# Patient Record
Sex: MALE | Race: WHITE | NOT HISPANIC OR LATINO | URBAN - METROPOLITAN AREA
[De-identification: names, ages, dates, MRNs, and addresses within clinical notes are randomized per-mention and may not be internally consistent; named-entity substitution may affect disease eponyms.]

---

## 2017-03-31 ENCOUNTER — EMERGENCY (EMERGENCY)
Facility: HOSPITAL | Age: 77
LOS: 1 days | Discharge: HOME | End: 2017-03-31
Attending: EMERGENCY MEDICINE | Admitting: EMERGENCY MEDICINE
Payer: MEDICARE

## 2017-03-31 VITALS
RESPIRATION RATE: 14 BRPM | SYSTOLIC BLOOD PRESSURE: 162 MMHG | DIASTOLIC BLOOD PRESSURE: 82 MMHG | OXYGEN SATURATION: 98 % | HEART RATE: 74 BPM | TEMPERATURE: 97 F

## 2017-03-31 PROCEDURE — 99284 EMERGENCY DEPT VISIT MOD MDM: CPT | Mod: GC

## 2017-03-31 PROCEDURE — 70486 CT MAXILLOFACIAL W/O DYE: CPT | Mod: 26

## 2017-03-31 RX ORDER — TETANUS TOXOID, REDUCED DIPHTHERIA TOXOID AND ACELLULAR PERTUSSIS VACCINE, ADSORBED 5; 2.5; 8; 8; 2.5 [IU]/.5ML; [IU]/.5ML; UG/.5ML; UG/.5ML; UG/.5ML
0.5 SUSPENSION INTRAMUSCULAR ONCE
Qty: 0 | Refills: 0 | Status: COMPLETED | OUTPATIENT
Start: 2017-03-31 | End: 2017-03-31

## 2017-03-31 RX ADMIN — TETANUS TOXOID, REDUCED DIPHTHERIA TOXOID AND ACELLULAR PERTUSSIS VACCINE, ADSORBED 0.5 MILLILITER(S): 5; 2.5; 8; 8; 2.5 SUSPENSION INTRAMUSCULAR at 22:48

## 2017-03-31 NOTE — ED PROVIDER NOTE - EYES, MLM
Clear bilaterally, pupils equal, round and reactive to light. Below the left eye hematoma and a superficial linear subcm lac no active bleeding, mild ttp at the left infraorbital area

## 2017-03-31 NOTE — ED PROVIDER NOTE - OBJECTIVE STATEMENT
75yo male with pmh of HTN, asthma, depression presents with left below the eye hematoma. Pt states that about 2 hours he was opening his car door quickly and hit his face. Pt denies loc or any other trauma. Pt states he had some bleeding came in because wife insisted. Pt has been acting normally denies all other symptoms.     Meds as per pharmacy - proair, tamsulosin, metformin, amlodipine, k, atrovastatin, citalopram, vesicare

## 2017-03-31 NOTE — ED ADULT TRIAGE NOTE - CHIEF COMPLAINT QUOTE
states was getting into his car and accidentally hit his face with the car door. small lac to left side of face, under eye with 1 inch hematoma. no trauma to eye. no headache. no other injuries. denies any pain, states it stings.

## 2017-03-31 NOTE — ED ADULT NURSE NOTE - OBJECTIVE STATEMENT
Patient a&ox4, ambulatory, arrived to intake room 6. Patient states opened car door and hit left cheek. Hematoma noted. No change in vision, No LOC, no LOC, no pain at this time. Patient seen by ed provider, medicated as documented, NAD, resting comfortably in stretcher. Awaiting Ct scan.

## 2017-03-31 NOTE — ED PROVIDER NOTE - ATTENDING CONTRIBUTION TO CARE
77 y/o M with h/o HTN, asthma, depression here with left facial swelling, bruising, and laceration.  Pt states that he was opening his car door, when the wind caught it and the door flew open and the upper corner of the door struck his left cheek.  Pt reports swelling, bruising, and laceration to the area.  No LOC or other injuries.  Unknown last tetanus.  Well appearing, lying comfortably in stretcher, awake and alert, nontoxic.  VSS.  NCAT, EOMI, PERRL with no proptosis or pain with EOM.  (+)contusion and ecchymosis to left maxilla, tenderness to palptation along left maxilla with no midface instability.  TMJ intact.  No malocclusion.  Lungs cta bl.  Cards nl S1/S2, RRR, no MRG.  Abd soft ntnd.  Plan to update tetanus, ct maxface to r/o facial fx.

## 2018-04-10 NOTE — ED ADULT NURSE NOTE - CADM POA CENTRAL LINE
Date Scheduled For Excision (Optional): may 1, 2019 X Size Of Lesion In Cm (Optional): 0 Detail Level: Detailed No

## 2019-10-04 NOTE — ED ADULT TRIAGE NOTE - PAIN RATING/NUMBER SCALE (0-10): ACTIVITY
Vascular Surgery  --comfortable; no changes from my end  --right foot is viable but cool/non-functional; ischemic muscle  --agree with SNF; if worsens or develops RLE pain/necrosis, next step is AKA (I am hoping we can avoid this) 0

## 2020-11-05 ENCOUNTER — INPATIENT (INPATIENT)
Facility: HOSPITAL | Age: 80
LOS: 1 days | Discharge: HOME | End: 2020-11-07
Attending: INTERNAL MEDICINE | Admitting: INTERNAL MEDICINE
Payer: MEDICARE

## 2020-11-05 VITALS
SYSTOLIC BLOOD PRESSURE: 173 MMHG | DIASTOLIC BLOOD PRESSURE: 109 MMHG | RESPIRATION RATE: 16 BRPM | TEMPERATURE: 98 F | OXYGEN SATURATION: 100 % | HEART RATE: 82 BPM

## 2020-11-05 DIAGNOSIS — R94.31 ABNORMAL ELECTROCARDIOGRAM [ECG] [EKG]: ICD-10-CM

## 2020-11-05 DIAGNOSIS — I20.0 UNSTABLE ANGINA: ICD-10-CM

## 2020-11-05 DIAGNOSIS — E11.9 TYPE 2 DIABETES MELLITUS WITHOUT COMPLICATIONS: ICD-10-CM

## 2020-11-05 DIAGNOSIS — I10 ESSENTIAL (PRIMARY) HYPERTENSION: ICD-10-CM

## 2020-11-05 LAB
ALBUMIN SERPL ELPH-MCNC: 4.4 G/DL — SIGNIFICANT CHANGE UP (ref 3.3–5)
ALP SERPL-CCNC: 115 U/L — SIGNIFICANT CHANGE UP (ref 40–120)
ALT FLD-CCNC: 11 U/L — SIGNIFICANT CHANGE UP (ref 4–41)
AMPHET UR-MCNC: NEGATIVE — SIGNIFICANT CHANGE UP
ANION GAP SERPL CALC-SCNC: 12 MMO/L — SIGNIFICANT CHANGE UP (ref 7–14)
APAP SERPL-MCNC: < 15 UG/ML — LOW (ref 15–25)
APTT BLD: 37.6 SEC — HIGH (ref 27–36.3)
AST SERPL-CCNC: 15 U/L — SIGNIFICANT CHANGE UP (ref 4–40)
BARBITURATES UR SCN-MCNC: NEGATIVE — SIGNIFICANT CHANGE UP
BASOPHILS # BLD AUTO: 0.05 K/UL — SIGNIFICANT CHANGE UP (ref 0–0.2)
BASOPHILS NFR BLD AUTO: 0.6 % — SIGNIFICANT CHANGE UP (ref 0–2)
BENZODIAZ UR-MCNC: NEGATIVE — SIGNIFICANT CHANGE UP
BILIRUB SERPL-MCNC: 0.9 MG/DL — SIGNIFICANT CHANGE UP (ref 0.2–1.2)
BUN SERPL-MCNC: 15 MG/DL — SIGNIFICANT CHANGE UP (ref 7–23)
CALCIUM SERPL-MCNC: 9.4 MG/DL — SIGNIFICANT CHANGE UP (ref 8.4–10.5)
CANNABINOIDS UR-MCNC: NEGATIVE — SIGNIFICANT CHANGE UP
CHLORIDE SERPL-SCNC: 103 MMOL/L — SIGNIFICANT CHANGE UP (ref 98–107)
CO2 SERPL-SCNC: 27 MMOL/L — SIGNIFICANT CHANGE UP (ref 22–31)
COCAINE METAB.OTHER UR-MCNC: NEGATIVE — SIGNIFICANT CHANGE UP
CREAT SERPL-MCNC: 1.2 MG/DL — SIGNIFICANT CHANGE UP (ref 0.5–1.3)
EOSINOPHIL # BLD AUTO: 0.08 K/UL — SIGNIFICANT CHANGE UP (ref 0–0.5)
EOSINOPHIL NFR BLD AUTO: 1 % — SIGNIFICANT CHANGE UP (ref 0–6)
ETHANOL BLD-MCNC: < 10 MG/DL — SIGNIFICANT CHANGE UP
GLUCOSE BLDC GLUCOMTR-MCNC: 149 MG/DL — HIGH (ref 70–99)
GLUCOSE BLDC GLUCOMTR-MCNC: 161 MG/DL — HIGH (ref 70–99)
GLUCOSE BLDC GLUCOMTR-MCNC: 215 MG/DL — HIGH (ref 70–99)
GLUCOSE SERPL-MCNC: 200 MG/DL — HIGH (ref 70–99)
HBA1C BLD-MCNC: 7.7 % — HIGH (ref 4–5.6)
HCT VFR BLD CALC: 50.3 % — HIGH (ref 39–50)
HGB BLD-MCNC: 17.5 G/DL — HIGH (ref 13–17)
IMM GRANULOCYTES NFR BLD AUTO: 0.1 % — SIGNIFICANT CHANGE UP (ref 0–1.5)
INR BLD: 1.04 — SIGNIFICANT CHANGE UP (ref 0.88–1.16)
LYMPHOCYTES # BLD AUTO: 2.34 K/UL — SIGNIFICANT CHANGE UP (ref 1–3.3)
LYMPHOCYTES # BLD AUTO: 30 % — SIGNIFICANT CHANGE UP (ref 13–44)
MCHC RBC-ENTMCNC: 30.6 PG — SIGNIFICANT CHANGE UP (ref 27–34)
MCHC RBC-ENTMCNC: 34.8 % — SIGNIFICANT CHANGE UP (ref 32–36)
MCV RBC AUTO: 87.9 FL — SIGNIFICANT CHANGE UP (ref 80–100)
METHADONE UR-MCNC: NEGATIVE — SIGNIFICANT CHANGE UP
MONOCYTES # BLD AUTO: 0.55 K/UL — SIGNIFICANT CHANGE UP (ref 0–0.9)
MONOCYTES NFR BLD AUTO: 7.1 % — SIGNIFICANT CHANGE UP (ref 2–14)
NEUTROPHILS # BLD AUTO: 4.76 K/UL — SIGNIFICANT CHANGE UP (ref 1.8–7.4)
NEUTROPHILS NFR BLD AUTO: 61.2 % — SIGNIFICANT CHANGE UP (ref 43–77)
NRBC # FLD: 0 K/UL — SIGNIFICANT CHANGE UP (ref 0–0)
OPIATES UR-MCNC: NEGATIVE — SIGNIFICANT CHANGE UP
OXYCODONE UR-MCNC: NEGATIVE — SIGNIFICANT CHANGE UP
PCP UR-MCNC: NEGATIVE — SIGNIFICANT CHANGE UP
PLATELET # BLD AUTO: 249 K/UL — SIGNIFICANT CHANGE UP (ref 150–400)
PMV BLD: 9.8 FL — SIGNIFICANT CHANGE UP (ref 7–13)
POTASSIUM SERPL-MCNC: 3.5 MMOL/L — SIGNIFICANT CHANGE UP (ref 3.5–5.3)
POTASSIUM SERPL-SCNC: 3.5 MMOL/L — SIGNIFICANT CHANGE UP (ref 3.5–5.3)
PROT SERPL-MCNC: 6.8 G/DL — SIGNIFICANT CHANGE UP (ref 6–8.3)
PROTHROM AB SERPL-ACNC: 11.9 SEC — SIGNIFICANT CHANGE UP (ref 10.6–13.6)
RBC # BLD: 5.72 M/UL — SIGNIFICANT CHANGE UP (ref 4.2–5.8)
RBC # FLD: 12.4 % — SIGNIFICANT CHANGE UP (ref 10.3–14.5)
SALICYLATES SERPL-MCNC: < 5 MG/DL — LOW (ref 15–30)
SARS-COV-2 RNA SPEC QL NAA+PROBE: SIGNIFICANT CHANGE UP
SODIUM SERPL-SCNC: 142 MMOL/L — SIGNIFICANT CHANGE UP (ref 135–145)
TROPONIN T, HIGH SENSITIVITY: 26 NG/L — SIGNIFICANT CHANGE UP (ref ?–14)
TROPONIN T, HIGH SENSITIVITY: 27 NG/L — SIGNIFICANT CHANGE UP (ref ?–14)
WBC # BLD: 7.79 K/UL — SIGNIFICANT CHANGE UP (ref 3.8–10.5)
WBC # FLD AUTO: 7.79 K/UL — SIGNIFICANT CHANGE UP (ref 3.8–10.5)

## 2020-11-05 PROCEDURE — 99285 EMERGENCY DEPT VISIT HI MDM: CPT

## 2020-11-05 PROCEDURE — 93010 ELECTROCARDIOGRAM REPORT: CPT

## 2020-11-05 PROCEDURE — 71046 X-RAY EXAM CHEST 2 VIEWS: CPT | Mod: 26

## 2020-11-05 PROCEDURE — 93306 TTE W/DOPPLER COMPLETE: CPT | Mod: 26

## 2020-11-05 RX ORDER — ASPIRIN/CALCIUM CARB/MAGNESIUM 324 MG
324 TABLET ORAL ONCE
Refills: 0 | Status: COMPLETED | OUTPATIENT
Start: 2020-11-05 | End: 2020-11-05

## 2020-11-05 RX ORDER — INSULIN LISPRO 100/ML
VIAL (ML) SUBCUTANEOUS AT BEDTIME
Refills: 0 | Status: DISCONTINUED | OUTPATIENT
Start: 2020-11-05 | End: 2020-11-07

## 2020-11-05 RX ORDER — AMLODIPINE BESYLATE 2.5 MG/1
10 TABLET ORAL DAILY
Refills: 0 | Status: DISCONTINUED | OUTPATIENT
Start: 2020-11-05 | End: 2020-11-07

## 2020-11-05 RX ORDER — GLUCAGON INJECTION, SOLUTION 0.5 MG/.1ML
1 INJECTION, SOLUTION SUBCUTANEOUS ONCE
Refills: 0 | Status: DISCONTINUED | OUTPATIENT
Start: 2020-11-05 | End: 2020-11-07

## 2020-11-05 RX ORDER — DEXTROSE 50 % IN WATER 50 %
25 SYRINGE (ML) INTRAVENOUS ONCE
Refills: 0 | Status: DISCONTINUED | OUTPATIENT
Start: 2020-11-05 | End: 2020-11-07

## 2020-11-05 RX ORDER — ACETAMINOPHEN 500 MG
650 TABLET ORAL ONCE
Refills: 0 | Status: COMPLETED | OUTPATIENT
Start: 2020-11-05 | End: 2020-11-05

## 2020-11-05 RX ORDER — SODIUM CHLORIDE 9 MG/ML
1000 INJECTION, SOLUTION INTRAVENOUS
Refills: 0 | Status: DISCONTINUED | OUTPATIENT
Start: 2020-11-05 | End: 2020-11-07

## 2020-11-05 RX ORDER — INSULIN LISPRO 100/ML
VIAL (ML) SUBCUTANEOUS
Refills: 0 | Status: DISCONTINUED | OUTPATIENT
Start: 2020-11-05 | End: 2020-11-07

## 2020-11-05 RX ORDER — ASPIRIN/CALCIUM CARB/MAGNESIUM 324 MG
81 TABLET ORAL DAILY
Refills: 0 | Status: DISCONTINUED | OUTPATIENT
Start: 2020-11-05 | End: 2020-11-06

## 2020-11-05 RX ORDER — HYDRALAZINE HCL 50 MG
10 TABLET ORAL ONCE
Refills: 0 | Status: COMPLETED | OUTPATIENT
Start: 2020-11-05 | End: 2020-11-05

## 2020-11-05 RX ORDER — METOPROLOL TARTRATE 50 MG
12.5 TABLET ORAL
Refills: 0 | Status: DISCONTINUED | OUTPATIENT
Start: 2020-11-05 | End: 2020-11-06

## 2020-11-05 RX ORDER — TAMSULOSIN HYDROCHLORIDE 0.4 MG/1
0.4 CAPSULE ORAL AT BEDTIME
Refills: 0 | Status: DISCONTINUED | OUTPATIENT
Start: 2020-11-05 | End: 2020-11-07

## 2020-11-05 RX ORDER — DEXTROSE 50 % IN WATER 50 %
12.5 SYRINGE (ML) INTRAVENOUS ONCE
Refills: 0 | Status: DISCONTINUED | OUTPATIENT
Start: 2020-11-05 | End: 2020-11-07

## 2020-11-05 RX ORDER — ATORVASTATIN CALCIUM 80 MG/1
40 TABLET, FILM COATED ORAL AT BEDTIME
Refills: 0 | Status: DISCONTINUED | OUTPATIENT
Start: 2020-11-05 | End: 2020-11-07

## 2020-11-05 RX ORDER — DEXTROSE 50 % IN WATER 50 %
15 SYRINGE (ML) INTRAVENOUS ONCE
Refills: 0 | Status: DISCONTINUED | OUTPATIENT
Start: 2020-11-05 | End: 2020-11-07

## 2020-11-05 RX ADMIN — TAMSULOSIN HYDROCHLORIDE 0.4 MILLIGRAM(S): 0.4 CAPSULE ORAL at 21:45

## 2020-11-05 RX ADMIN — Medication 650 MILLIGRAM(S): at 21:45

## 2020-11-05 RX ADMIN — Medication 650 MILLIGRAM(S): at 22:00

## 2020-11-05 RX ADMIN — Medication 10 MILLIGRAM(S): at 14:02

## 2020-11-05 RX ADMIN — AMLODIPINE BESYLATE 10 MILLIGRAM(S): 2.5 TABLET ORAL at 17:13

## 2020-11-05 RX ADMIN — Medication 2: at 16:56

## 2020-11-05 RX ADMIN — ATORVASTATIN CALCIUM 40 MILLIGRAM(S): 80 TABLET, FILM COATED ORAL at 21:45

## 2020-11-05 RX ADMIN — Medication 324 MILLIGRAM(S): at 12:37

## 2020-11-05 RX ADMIN — Medication 81 MILLIGRAM(S): at 21:46

## 2020-11-05 NOTE — H&P ADULT - PROBLEM SELECTOR PLAN 1
Left arm pain, Flattening of T waves on ekg, will admit to tele, spoke to cards, plan for cardiac cath am tomorrow. Continue with Aspirin, statins and low dose Beta blockers.

## 2020-11-05 NOTE — CHART NOTE - NSCHARTNOTEFT_GEN_A_CORE
Notified by RN that patient with episode of PAT( 9 beats HR at 77 ) and which resolved on its own. Per RN patient asymptomatic. Will continue to monitor on Telemetry.     T(C): 36.9 (05 Nov 2020 22:44), Max: 37.8 (05 Nov 2020 18:01)  T(F): 98.4 (05 Nov 2020 22:44), Max: 100.1 (05 Nov 2020 18:01)  HR: 84 (05 Nov 2020 22:44) (67 - 108)  BP: 140/92 (05 Nov 2020 22:44) (140/92 - 178/95)  BP(mean): 103 (05 Nov 2020 14:11) (103 - 103)  RR: 18 (05 Nov 2020 22:44) (16 - 20)  SpO2: 97% (05 Nov 2020 22:44) (95% - 100%)

## 2020-11-05 NOTE — ED PROVIDER NOTE - OBJECTIVE STATEMENT
79y male with PMH of HTN, DM2, HLD, depression presenting with an abnormal EKG. He went to his primary doctor this morning for a regularly scheduled check up, found to have T wave inversions in the lateral leads. States had episode of aching arm pain that lasted ~1.5 hours last night. Patient states he feels well, no health complaints at this time. No chest pain, SOB, leg swelling, nausea, vomiting, diaphoresis, dizziness.

## 2020-11-05 NOTE — ED PROVIDER NOTE - PHYSICAL EXAMINATION
Gen: AAOx3, non-toxic  Head: NCAT  HEENT: oral mucosa moist, normal conjunctiva  Lung: CTAB, no respiratory distress, no wheezes/rhonchi/rales B/L, speaking in full sentences  CV: RRR, no murmurs, rubs or gallops. No LE edema  Abd: soft, NTND, no guarding  MSK: no visible deformities  Neuro: No focal sensory or motor deficits  Skin: Warm, well perfused, no rash  Psych: normal affect.   ~Bandar Gretel PGY3

## 2020-11-05 NOTE — ED PROVIDER NOTE - NS ED ROS FT
Gen: No fever, No chills  Eyes: No vision changes  ENT: No congestion, sore throat  Resp: No cough or SOB  Cardiovascular: No chest pain or palpitations  GI: No abdominal pain, nausea/vomiting, diarrhea, constipation  :  No change in urine output or frequency; no dysuria  MS: No joint or muscle pain  Skin: No rashes  Neuro: No headache; No numbness or weakness  Remainder negative, except as per the HPI

## 2020-11-05 NOTE — H&P ADULT - HISTORY OF PRESENT ILLNESS
79y male with pmhx htn, dm, hld depression sent in by his pmd for EKG changes. Patient reports left arm pain last night which lasted for couple of hours  Patient endorses of having left arm pain on an doff over the past few weeks on exertion relieved with rest. Patient went to his primary doctor this morning for a regularly scheduled check up, found to have T wave inversions in the lateral leads.       In the ed patient had Ekg noted to have T wave inversions.

## 2020-11-05 NOTE — ED ADULT TRIAGE NOTE - CHIEF COMPLAINT QUOTE
Went to PMD yesterday for pain in his L arm, had EKG done which was abnml and instructed to come to ED. Denies any arm pain now, denies SOB/ CP/ dizziness/ N/V/ other complaints. PMH HTN HLD DMII MDD.

## 2020-11-05 NOTE — H&P ADULT - RS GEN PE MLT RESP DETAILS PC
no wheezes/breath sounds equal/no chest wall tenderness/clear to auscultation bilaterally/no intercostal retractions/no rales/no subcutaneous emphysema/airway patent

## 2020-11-05 NOTE — ED PROVIDER NOTE - ATTENDING CONTRIBUTION TO CARE
Pt was seen and evaluated by me. Pt is 78 y/o male with PMHx of HTN, DM type 2, HLD, and Depression who presented to the ED for abnormal EKG. Pt states he was having some left arm pain that lasted 1.5 hours. Pt was seen by his PMD and noted to have an abnormal EKG with TWI in V5&V6. Pt states since pain has resolved. Pt denies any fever, chills, nausea, vomiting, SOB, chest pain, or abd pain. Denies any current arm pain. Pt denies any trauma or falls to the area. Lungs CTA b/l. RRR. Abd soft, non-tender. No bony tenderness. FROM of b/u UE.   Concern for ACS with abnormal EKG  Labs, EKG, CXR

## 2020-11-05 NOTE — PROVIDER CONTACT NOTE (OTHER) - ASSESSMENT
Patient had a burst of PAT- 9 beats. HR was 77. Patient was sleeping during burst. Denies chest pain, pain, palpitations and SOB. VS stable.

## 2020-11-05 NOTE — H&P ADULT - MUSCULOSKELETAL
details… detailed exam ROM intact/no joint erythema/no calf tenderness/no joint warmth/no joint swelling/normal strength/decreased ROM due to pain

## 2020-11-05 NOTE — ED PROVIDER NOTE - CLINICAL SUMMARY MEDICAL DECISION MAKING FREE TEXT BOX
79y male with new T wave inversions in the lateral leads. HEART score 5 prior to trop. Concern for acs. Will get labs including trop, ekg, cxr, and cdu vs admit.

## 2020-11-05 NOTE — ED ADULT NURSE NOTE - NSIMPLEMENTINTERV_GEN_ALL_ED
Implemented All Universal Safety Interventions:  [place] to call system. Call bell, personal items and telephone within reach. Instruct patient to call for assistance. Room bathroom lighting operational. Non-slip footwear when patient is off stretcher. Physically safe environment: no spills, clutter or unnecessary equipment. Stretcher in lowest position, wheels locked, appropriate side rails in place.

## 2020-11-05 NOTE — H&P ADULT - PROBLEM SELECTOR PROBLEM 3
Detail Level: Detailed Type 2 diabetes mellitus without complication, without long-term current use of insulin

## 2020-11-05 NOTE — ED PROVIDER NOTE - MUSCULOSKELETAL, MLM
Spine appears normal, range of motion is not limited, no muscle or joint tenderness. No bony tenderness. FROM of of b/l UE.

## 2020-11-06 LAB
ANION GAP SERPL CALC-SCNC: 12 MMO/L — SIGNIFICANT CHANGE UP (ref 7–14)
ANION GAP SERPL CALC-SCNC: 8 MMO/L — SIGNIFICANT CHANGE UP (ref 7–14)
BASOPHILS # BLD AUTO: 0.04 K/UL — SIGNIFICANT CHANGE UP (ref 0–0.2)
BASOPHILS NFR BLD AUTO: 0.5 % — SIGNIFICANT CHANGE UP (ref 0–2)
BUN SERPL-MCNC: 17 MG/DL — SIGNIFICANT CHANGE UP (ref 7–23)
BUN SERPL-MCNC: 18 MG/DL — SIGNIFICANT CHANGE UP (ref 7–23)
CALCIUM SERPL-MCNC: 9.1 MG/DL — SIGNIFICANT CHANGE UP (ref 8.4–10.5)
CALCIUM SERPL-MCNC: 9.4 MG/DL — SIGNIFICANT CHANGE UP (ref 8.4–10.5)
CHLORIDE SERPL-SCNC: 106 MMOL/L — SIGNIFICANT CHANGE UP (ref 98–107)
CHLORIDE SERPL-SCNC: 109 MMOL/L — HIGH (ref 98–107)
CK MB BLD-MCNC: 1.97 NG/ML — SIGNIFICANT CHANGE UP (ref 1–6.6)
CK SERPL-CCNC: 60 U/L — SIGNIFICANT CHANGE UP (ref 30–200)
CO2 SERPL-SCNC: 25 MMOL/L — SIGNIFICANT CHANGE UP (ref 22–31)
CO2 SERPL-SCNC: 27 MMOL/L — SIGNIFICANT CHANGE UP (ref 22–31)
CREAT SERPL-MCNC: 1.03 MG/DL — SIGNIFICANT CHANGE UP (ref 0.5–1.3)
CREAT SERPL-MCNC: 1.03 MG/DL — SIGNIFICANT CHANGE UP (ref 0.5–1.3)
EOSINOPHIL # BLD AUTO: 0.12 K/UL — SIGNIFICANT CHANGE UP (ref 0–0.5)
EOSINOPHIL NFR BLD AUTO: 1.5 % — SIGNIFICANT CHANGE UP (ref 0–6)
GLUCOSE BLDC GLUCOMTR-MCNC: 120 MG/DL — HIGH (ref 70–99)
GLUCOSE BLDC GLUCOMTR-MCNC: 140 MG/DL — HIGH (ref 70–99)
GLUCOSE BLDC GLUCOMTR-MCNC: 145 MG/DL — HIGH (ref 70–99)
GLUCOSE BLDC GLUCOMTR-MCNC: 163 MG/DL — HIGH (ref 70–99)
GLUCOSE SERPL-MCNC: 140 MG/DL — HIGH (ref 70–99)
GLUCOSE SERPL-MCNC: 181 MG/DL — HIGH (ref 70–99)
HCT VFR BLD CALC: 45.7 % — SIGNIFICANT CHANGE UP (ref 39–50)
HGB BLD-MCNC: 16.1 G/DL — SIGNIFICANT CHANGE UP (ref 13–17)
IMM GRANULOCYTES NFR BLD AUTO: 0.3 % — SIGNIFICANT CHANGE UP (ref 0–1.5)
LYMPHOCYTES # BLD AUTO: 1.98 K/UL — SIGNIFICANT CHANGE UP (ref 1–3.3)
LYMPHOCYTES # BLD AUTO: 25.3 % — SIGNIFICANT CHANGE UP (ref 13–44)
MAGNESIUM SERPL-MCNC: 2.4 MG/DL — SIGNIFICANT CHANGE UP (ref 1.6–2.6)
MCHC RBC-ENTMCNC: 30.7 PG — SIGNIFICANT CHANGE UP (ref 27–34)
MCHC RBC-ENTMCNC: 35.2 % — SIGNIFICANT CHANGE UP (ref 32–36)
MCV RBC AUTO: 87.2 FL — SIGNIFICANT CHANGE UP (ref 80–100)
MONOCYTES # BLD AUTO: 0.64 K/UL — SIGNIFICANT CHANGE UP (ref 0–0.9)
MONOCYTES NFR BLD AUTO: 8.2 % — SIGNIFICANT CHANGE UP (ref 2–14)
NEUTROPHILS # BLD AUTO: 5.03 K/UL — SIGNIFICANT CHANGE UP (ref 1.8–7.4)
NEUTROPHILS NFR BLD AUTO: 64.2 % — SIGNIFICANT CHANGE UP (ref 43–77)
NRBC # FLD: 0 K/UL — SIGNIFICANT CHANGE UP (ref 0–0)
NT-PROBNP SERPL-SCNC: 139.8 PG/ML — SIGNIFICANT CHANGE UP
PHOSPHATE SERPL-MCNC: 3 MG/DL — SIGNIFICANT CHANGE UP (ref 2.5–4.5)
PLATELET # BLD AUTO: 240 K/UL — SIGNIFICANT CHANGE UP (ref 150–400)
PMV BLD: 9.9 FL — SIGNIFICANT CHANGE UP (ref 7–13)
POTASSIUM SERPL-MCNC: 2.7 MMOL/L — CRITICAL LOW (ref 3.5–5.3)
POTASSIUM SERPL-MCNC: 4.3 MMOL/L — SIGNIFICANT CHANGE UP (ref 3.5–5.3)
POTASSIUM SERPL-SCNC: 2.7 MMOL/L — CRITICAL LOW (ref 3.5–5.3)
POTASSIUM SERPL-SCNC: 4.3 MMOL/L — SIGNIFICANT CHANGE UP (ref 3.5–5.3)
RBC # BLD: 5.24 M/UL — SIGNIFICANT CHANGE UP (ref 4.2–5.8)
RBC # FLD: 12.5 % — SIGNIFICANT CHANGE UP (ref 10.3–14.5)
SODIUM SERPL-SCNC: 143 MMOL/L — SIGNIFICANT CHANGE UP (ref 135–145)
SODIUM SERPL-SCNC: 144 MMOL/L — SIGNIFICANT CHANGE UP (ref 135–145)
TROPONIN T, HIGH SENSITIVITY: 26 NG/L — SIGNIFICANT CHANGE UP (ref ?–14)
WBC # BLD: 7.83 K/UL — SIGNIFICANT CHANGE UP (ref 3.8–10.5)
WBC # FLD AUTO: 7.83 K/UL — SIGNIFICANT CHANGE UP (ref 3.8–10.5)

## 2020-11-06 RX ORDER — METOPROLOL TARTRATE 50 MG
25 TABLET ORAL
Refills: 0 | Status: DISCONTINUED | OUTPATIENT
Start: 2020-11-06 | End: 2020-11-07

## 2020-11-06 RX ORDER — ASPIRIN/CALCIUM CARB/MAGNESIUM 324 MG
81 TABLET ORAL DAILY
Refills: 0 | Status: DISCONTINUED | OUTPATIENT
Start: 2020-11-06 | End: 2020-11-07

## 2020-11-06 RX ORDER — POTASSIUM CHLORIDE 20 MEQ
40 PACKET (EA) ORAL EVERY 4 HOURS
Refills: 0 | Status: COMPLETED | OUTPATIENT
Start: 2020-11-06 | End: 2020-11-06

## 2020-11-06 RX ORDER — CLOPIDOGREL BISULFATE 75 MG/1
75 TABLET, FILM COATED ORAL DAILY
Refills: 0 | Status: DISCONTINUED | OUTPATIENT
Start: 2020-11-07 | End: 2020-11-07

## 2020-11-06 RX ORDER — POTASSIUM CHLORIDE 20 MEQ
10 PACKET (EA) ORAL
Refills: 0 | Status: COMPLETED | OUTPATIENT
Start: 2020-11-06 | End: 2020-11-06

## 2020-11-06 RX ORDER — ENOXAPARIN SODIUM 100 MG/ML
40 INJECTION SUBCUTANEOUS DAILY
Refills: 0 | Status: DISCONTINUED | OUTPATIENT
Start: 2020-11-06 | End: 2020-11-07

## 2020-11-06 RX ADMIN — Medication 12.5 MILLIGRAM(S): at 05:15

## 2020-11-06 RX ADMIN — Medication 25 MILLIGRAM(S): at 18:30

## 2020-11-06 RX ADMIN — AMLODIPINE BESYLATE 10 MILLIGRAM(S): 2.5 TABLET ORAL at 05:16

## 2020-11-06 RX ADMIN — ATORVASTATIN CALCIUM 40 MILLIGRAM(S): 80 TABLET, FILM COATED ORAL at 21:23

## 2020-11-06 RX ADMIN — Medication 40 MILLIEQUIVALENT(S): at 12:02

## 2020-11-06 RX ADMIN — Medication 100 MILLIEQUIVALENT(S): at 10:01

## 2020-11-06 RX ADMIN — Medication 100 MILLIEQUIVALENT(S): at 12:02

## 2020-11-06 RX ADMIN — Medication 100 MILLIEQUIVALENT(S): at 08:44

## 2020-11-06 RX ADMIN — Medication 40 MILLIEQUIVALENT(S): at 07:50

## 2020-11-06 RX ADMIN — Medication 81 MILLIGRAM(S): at 12:02

## 2020-11-06 RX ADMIN — Medication 1: at 07:47

## 2020-11-06 RX ADMIN — TAMSULOSIN HYDROCHLORIDE 0.4 MILLIGRAM(S): 0.4 CAPSULE ORAL at 21:23

## 2020-11-06 NOTE — PROVIDER CONTACT NOTE (OTHER) - ASSESSMENT
Patients BP electronically is 161/102. BP manually is 162/94. A&Ox4. Denies pain, chest pain and headache. No signs of distress noted.

## 2020-11-06 NOTE — CONSULT NOTE ADULT - ASSESSMENT
1. atypical left arm pain  2. T inversions laterally  3. DM HBP  4. depression  5. normal LV function    Recommend  diagnostic cath possible PTCI  continue present meds increase metoprolol 25 BID    Lewis North MD  [phone]

## 2020-11-06 NOTE — CONSULT NOTE ADULT - SUBJECTIVE AND OBJECTIVE BOX
CHIEF COMPLAINT:  left arm pain  HISTORY OF PRESENT ILLNESS:  HPI:  79y male with pmhx htn, dm, hld depression sent in by his pmd for EKG changes. Patient reports left arm pain last night which lasted for couple of hours  Patient endorses of having left arm pain on an doff over the past few weeks on exertion relieved with rest. Patient went to his primary doctor this morning for a regularly scheduled check up, found to have T wave inversions in the lateral leads.   In the ed patient had Ekg noted to have T wave inversions.  (05 Nov 2020 14:11)    Presently no further left arm pian emotional crying no sob  troponin normal ecg no further change hypertensive  PAST MEDICAL & SURGICAL HISTORY:  Asthma    Depression    HTN (hypertension)    DM (diabetes mellitus)    No significant past surgical history            MEDICATIONS:  amLODIPine   Tablet 10 milliGRAM(s) Oral daily  aspirin  chewable 81 milliGRAM(s) Oral daily  metoprolol tartrate 12.5 milliGRAM(s) Oral two times a day  tamsulosin 0.4 milliGRAM(s) Oral at bedtime            atorvastatin 40 milliGRAM(s) Oral at bedtime  dextrose 40% Gel 15 Gram(s) Oral once PRN  dextrose 50% Injectable 12.5 Gram(s) IV Push once  dextrose 50% Injectable 25 Gram(s) IV Push once  dextrose 50% Injectable 25 Gram(s) IV Push once  glucagon  Injectable 1 milliGRAM(s) IntraMuscular once PRN  insulin lispro (ADMELOG) corrective regimen sliding scale   SubCutaneous three times a day before meals  insulin lispro (ADMELOG) corrective regimen sliding scale   SubCutaneous at bedtime    dextrose 5%. 1000 milliLiter(s) IV Continuous <Continuous>      FAMILY HISTORY:  No pertinent family history in first degree relatives        SOCIAL HISTORY:    [ ] Non-smoker  [ ] Smoker  [ ] Alcohol    Allergies    No Known Allergies    Intolerances    	    PHYSICAL EXAM:  T(C): 36.6 (11-06-20 @ 05:09), Max: 37.8 (11-05-20 @ 18:01)  HR: 100 (11-06-20 @ 05:09) (67 - 108)  BP: 156/92 (11-06-20 @ 06:08) (140/92 - 178/95)  RR: 16 (11-06-20 @ 05:09) (16 - 20)  SpO2: 98% (11-06-20 @ 05:09) (95% - 100%)  Wt(kg): --  I&O's Summary      Appearance: Normal emotional and crying wants to go home		  Cardiovascular: Normal S1 S2, No JVD, No murmurs  Respiratory: Lungs clear to auscultation	  Psychiatry: depressed anxious emotional oriented x 3  Gastrointestinal:  Soft, Non-tender, + BS	  Skin: No rashes, No ecchymoses, No cyanosis	  Neurologic: Non-focal  Extremities: No clubbing, cyanosis or edema  Vascular: Peripheral pulses palpable 2+ bilaterally    TELEMETRY: 	    ECG:  NSR T changes laterally	  RADIOLOGY:  < from: Transthoracic Echocardiogram (11.05.20 @ 20:15) >  CONCLUSIONS:  1. Calcified trileaflet aortic valve with normal opening.  2. Normal left ventricular internal dimensions and wall  thicknesses.  3. Normal left ventricular systolic function. No segmental  wall motion abnormalities.  4. Mild diastolic dysfunction (Stage I).  5. Normal right ventricular size and function.  *** No previous Echo exam.  ------------------------------------------------------------------------  Confirmed on  11/5/2020 - 21:05:42 by Tesha Monaco MD  ------------------------------------------------------------------------    < end of copied text >  c 	  	  LABS:	 	    CARDIAC MARKERS:      CKMB: 1.97 ng/mL (11-05 @ 23:13)                              16.1   7.83  )-----------( 240      ( 06 Nov 2020 06:01 )             45.7     11-05    142  |  103  |  15  ----------------------------<  200<H>  3.5   |  27  |  1.20    Ca    9.4      05 Nov 2020 10:22    TPro  6.8  /  Alb  4.4  /  TBili  0.9  /  DBili  x   /  AST  15  /  ALT  11  /  AlkPhos  115  11-05    proBNP:   Lipid Profile:   HgA1c:   TSH:

## 2020-11-06 NOTE — PROGRESS NOTE ADULT - PROBLEM SELECTOR PLAN 1
Left arm pain, Flattening of T waves on ekg, will admit to tele, spoke to cards, plan for cardiac cath today. Continue with Aspirin, statins and low dose Beta blockers.

## 2020-11-06 NOTE — PROGRESS NOTE ADULT - SUBJECTIVE AND OBJECTIVE BOX
Patient is a 79y old  Male who presents with a chief complaint of Sent in by PMD for EKG changes. (06 Nov 2020 07:22)      SUBJECTIVE / OVERNIGHT EVENTS: No events over night     T(C): 36.7 (11-06-20 @ 21:05), Max: 36.8 (11-06-20 @ 10:52)  HR: 83 (11-06-20 @ 21:05) (73 - 88)  BP: 144/86 (11-06-20 @ 21:05) (141/86 - 144/86)  RR: 18 (11-06-20 @ 21:05) (16 - 18)  SpO2: 100% (11-06-20 @ 21:05) (100% - 100%)      MEDICATIONS  (STANDING):  amLODIPine   Tablet 10 milliGRAM(s) Oral daily  aspirin enteric coated 81 milliGRAM(s) Oral daily  atorvastatin 40 milliGRAM(s) Oral at bedtime  dextrose 5%. 1000 milliLiter(s) (50 mL/Hr) IV Continuous <Continuous>  dextrose 50% Injectable 12.5 Gram(s) IV Push once  dextrose 50% Injectable 25 Gram(s) IV Push once  dextrose 50% Injectable 25 Gram(s) IV Push once  enoxaparin Injectable 40 milliGRAM(s) SubCutaneous daily  insulin lispro (ADMELOG) corrective regimen sliding scale   SubCutaneous three times a day before meals  insulin lispro (ADMELOG) corrective regimen sliding scale   SubCutaneous at bedtime  metoprolol tartrate 25 milliGRAM(s) Oral two times a day  tamsulosin 0.4 milliGRAM(s) Oral at bedtime    MEDICATIONS  (PRN):  dextrose 40% Gel 15 Gram(s) Oral once PRN Blood Glucose LESS THAN 70 milliGRAM(s)/deciliter  glucagon  Injectable 1 milliGRAM(s) IntraMuscular once PRN Glucose LESS THAN 70 milligrams/deciliter      PHYSICAL EXAM:  GENERAL: NAD, well-developed  HEAD:  Atraumatic, Normocephalic  EYES: EOMI, PERRLA, conjunctiva and sclera clear  NECK: Supple, No JVD  CHEST/LUNG: Clear to auscultation bilaterally; No wheeze  HEART: Regular rate and rhythm; No murmurs, rubs, or gallops  ABDOMEN: Soft, Nontender, Nondistended; Bowel sounds present  EXTREMITIES:  2+ Peripheral Pulses, No clubbing, cyanosis, or edema  PSYCH: AAOx3  NEUROLOGY: non-focal  SKIN: No rashes or lesions                             16.1   7.83  )-----------( 240      ( 06 Nov 2020 06:01 )             45.7       CARDIAC MARKERS ( 05 Nov 2020 23:13 )  x     / x     / 60 u/L / 1.97 ng/mL / x          LIVER FUNCTIONS - ( 05 Nov 2020 10:22 )  Alb: 4.4 g/dL / Pro: 6.8 g/dL / ALK PHOS: 115 u/L / ALT: 11 u/L / AST: 15 u/L / GGT: x           PT/INR - ( 05 Nov 2020 10:00 )   PT: 11.9 SEC;   INR: 1.04          PTT - ( 05 Nov 2020 10:00 )  PTT:37.6 SEC  144|109|17<140  4.3|27|1.03  9.4,--,--  11-06 @ 14:06  143|106|18<181  2.7|25|1.03  9.1,2.4,3.0  11-06 @ 06:01      RADIOLOGY & ADDITIONAL TESTS:    Imaging Personally Reviewed:    Consultant(s) Notes Reviewed:      Care Discussed with Consultants/Other Providers:

## 2020-11-06 NOTE — PHYSICAL THERAPY INITIAL EVALUATION ADULT - PERTINENT HX OF CURRENT PROBLEM, REHAB EVAL
79 year old male with pmhx htn, dm, hld depression sent in by his pmd for EKG changes. Patient reports left arm pain last night which lasted for couple of hours.  Patient endorses having left arm pain on an doff over the past few weeks on exertion relieved with rest. Patient went to his primary doctor for a regularly scheduled check up, found to have T wave inversions in the lateral leads.

## 2020-11-06 NOTE — CHART NOTE - NSCHARTNOTEFT_GEN_A_CORE
WILEY BLAKE  [id number] 79y    Patient status post cath via right radial artery. Site clean, dry, intact. Pulses present, capillary refill appropriate. Without hematoma. Will continue to follow closely.    Vital Signs Last 24 Hrs  T(C): 36.7 (06 Nov 2020 13:56), Max: 36.9 (05 Nov 2020 22:44)  T(F): 98 (06 Nov 2020 13:56), Max: 98.4 (05 Nov 2020 22:44)  HR: 73 (06 Nov 2020 13:56) (73 - 100)  BP: 141/94 (06 Nov 2020 13:56) (140/92 - 173/81)  BP(mean): --  RR: 17 (06 Nov 2020 13:56) (16 - 18)  SpO2: 100% (06 Nov 2020 13:56) (97% - 100%)

## 2020-11-07 ENCOUNTER — TRANSCRIPTION ENCOUNTER (OUTPATIENT)
Age: 80
End: 2020-11-07

## 2020-11-07 VITALS
DIASTOLIC BLOOD PRESSURE: 81 MMHG | OXYGEN SATURATION: 98 % | RESPIRATION RATE: 17 BRPM | HEART RATE: 71 BPM | SYSTOLIC BLOOD PRESSURE: 130 MMHG | TEMPERATURE: 98 F

## 2020-11-07 LAB
ANION GAP SERPL CALC-SCNC: 10 MMO/L — SIGNIFICANT CHANGE UP (ref 7–14)
BUN SERPL-MCNC: 16 MG/DL — SIGNIFICANT CHANGE UP (ref 7–23)
CALCIUM SERPL-MCNC: 9 MG/DL — SIGNIFICANT CHANGE UP (ref 8.4–10.5)
CHLORIDE SERPL-SCNC: 109 MMOL/L — HIGH (ref 98–107)
CO2 SERPL-SCNC: 23 MMOL/L — SIGNIFICANT CHANGE UP (ref 22–31)
CREAT SERPL-MCNC: 1.01 MG/DL — SIGNIFICANT CHANGE UP (ref 0.5–1.3)
GLUCOSE BLDC GLUCOMTR-MCNC: 119 MG/DL — HIGH (ref 70–99)
GLUCOSE BLDC GLUCOMTR-MCNC: 136 MG/DL — HIGH (ref 70–99)
GLUCOSE SERPL-MCNC: 123 MG/DL — HIGH (ref 70–99)
MAGNESIUM SERPL-MCNC: 2.1 MG/DL — SIGNIFICANT CHANGE UP (ref 1.6–2.6)
PHOSPHATE SERPL-MCNC: 3.2 MG/DL — SIGNIFICANT CHANGE UP (ref 2.5–4.5)
POTASSIUM SERPL-MCNC: 3.4 MMOL/L — LOW (ref 3.5–5.3)
POTASSIUM SERPL-SCNC: 3.4 MMOL/L — LOW (ref 3.5–5.3)
SODIUM SERPL-SCNC: 142 MMOL/L — SIGNIFICANT CHANGE UP (ref 135–145)

## 2020-11-07 RX ORDER — AMLODIPINE BESYLATE 2.5 MG/1
1 TABLET ORAL
Qty: 0 | Refills: 0 | DISCHARGE
Start: 2020-11-07

## 2020-11-07 RX ORDER — METFORMIN HYDROCHLORIDE 850 MG/1
2 TABLET ORAL
Qty: 0 | Refills: 0 | DISCHARGE

## 2020-11-07 RX ORDER — POTASSIUM CITRATE MONOHYDRATE 100 %
1 POWDER (GRAM) MISCELLANEOUS
Qty: 0 | Refills: 0 | DISCHARGE

## 2020-11-07 RX ORDER — METFORMIN HYDROCHLORIDE 850 MG/1
2 TABLET ORAL
Qty: 120 | Refills: 0
Start: 2020-11-07 | End: 2020-12-06

## 2020-11-07 RX ORDER — CITALOPRAM 10 MG/1
0 TABLET, FILM COATED ORAL
Qty: 0 | Refills: 0 | DISCHARGE

## 2020-11-07 RX ORDER — METOPROLOL TARTRATE 50 MG
1 TABLET ORAL
Qty: 60 | Refills: 0
Start: 2020-11-07 | End: 2020-12-06

## 2020-11-07 RX ORDER — ALBUTEROL 90 UG/1
2 AEROSOL, METERED ORAL
Qty: 0 | Refills: 0 | DISCHARGE

## 2020-11-07 RX ORDER — FESOTERODINE FUMARATE 8 MG/1
1 TABLET, FILM COATED, EXTENDED RELEASE ORAL
Qty: 0 | Refills: 0 | DISCHARGE

## 2020-11-07 RX ORDER — OMEPRAZOLE 10 MG/1
1 CAPSULE, DELAYED RELEASE ORAL
Qty: 0 | Refills: 0 | DISCHARGE

## 2020-11-07 RX ORDER — ASPIRIN/CALCIUM CARB/MAGNESIUM 324 MG
1 TABLET ORAL
Qty: 30 | Refills: 0
Start: 2020-11-07 | End: 2020-12-06

## 2020-11-07 RX ORDER — AMLODIPINE BESYLATE 2.5 MG/1
1 TABLET ORAL
Qty: 0 | Refills: 0 | DISCHARGE

## 2020-11-07 RX ORDER — CLOPIDOGREL BISULFATE 75 MG/1
1 TABLET, FILM COATED ORAL
Qty: 30 | Refills: 0
Start: 2020-11-07 | End: 2020-12-06

## 2020-11-07 RX ORDER — INDAPAMIDE 1.25 MG
1 TABLET ORAL
Qty: 0 | Refills: 0 | DISCHARGE

## 2020-11-07 RX ORDER — ALPRAZOLAM 0.25 MG
1 TABLET ORAL
Qty: 0 | Refills: 0 | DISCHARGE

## 2020-11-07 RX ORDER — ATORVASTATIN CALCIUM 80 MG/1
1 TABLET, FILM COATED ORAL
Qty: 0 | Refills: 0 | DISCHARGE

## 2020-11-07 RX ORDER — AMLODIPINE BESYLATE 2.5 MG/1
1 TABLET ORAL
Qty: 30 | Refills: 0
Start: 2020-11-07 | End: 2020-12-06

## 2020-11-07 RX ORDER — ATORVASTATIN CALCIUM 80 MG/1
1 TABLET, FILM COATED ORAL
Qty: 30 | Refills: 0
Start: 2020-11-07 | End: 2020-12-06

## 2020-11-07 RX ADMIN — AMLODIPINE BESYLATE 10 MILLIGRAM(S): 2.5 TABLET ORAL at 06:08

## 2020-11-07 RX ADMIN — ENOXAPARIN SODIUM 40 MILLIGRAM(S): 100 INJECTION SUBCUTANEOUS at 12:12

## 2020-11-07 RX ADMIN — Medication 25 MILLIGRAM(S): at 06:08

## 2020-11-07 RX ADMIN — Medication 81 MILLIGRAM(S): at 12:12

## 2020-11-07 RX ADMIN — CLOPIDOGREL BISULFATE 75 MILLIGRAM(S): 75 TABLET, FILM COATED ORAL at 12:12

## 2020-11-07 NOTE — DISCHARGE NOTE NURSING/CASE MANAGEMENT/SOCIAL WORK - PATIENT PORTAL LINK FT
You can access the FollowMyHealth Patient Portal offered by [facility] by registering at the following website: [email or web address]. By joining [employer]’s FollowMyHealth portal, you will also be able to view your health information using other applications (apps) compatible with our system.

## 2020-11-07 NOTE — PROGRESS NOTE ADULT - SUBJECTIVE AND OBJECTIVE BOX
Patient is a 79y old  Male who presents with a chief complaint of Sent in by PMD for EKG changes. (06 Nov 2020 07:22)      SUBJECTIVE / OVERNIGHT EVENTS: No events over night     MEDICATIONS  (STANDING):  amLODIPine   Tablet 10 milliGRAM(s) Oral daily  aspirin enteric coated 81 milliGRAM(s) Oral daily  atorvastatin 40 milliGRAM(s) Oral at bedtime  clopidogrel Tablet 75 milliGRAM(s) Oral daily  dextrose 5%. 1000 milliLiter(s) (50 mL/Hr) IV Continuous <Continuous>  dextrose 50% Injectable 12.5 Gram(s) IV Push once  dextrose 50% Injectable 25 Gram(s) IV Push once  dextrose 50% Injectable 25 Gram(s) IV Push once  enoxaparin Injectable 40 milliGRAM(s) SubCutaneous daily  insulin lispro (ADMELOG) corrective regimen sliding scale   SubCutaneous three times a day before meals  insulin lispro (ADMELOG) corrective regimen sliding scale   SubCutaneous at bedtime  metoprolol tartrate 25 milliGRAM(s) Oral two times a day  tamsulosin 0.4 milliGRAM(s) Oral at bedtime    MEDICATIONS  (PRN):  dextrose 40% Gel 15 Gram(s) Oral once PRN Blood Glucose LESS THAN 70 milliGRAM(s)/deciliter  glucagon  Injectable 1 milliGRAM(s) IntraMuscular once PRN Glucose LESS THAN 70 milligrams/deciliter    N 70 milligrams/deciliter      PHYSICAL EXAM:  GENERAL: NAD, well-developed  HEAD:  Atraumatic, Normocephalic  EYES: EOMI, PERRLA, conjunctiva and sclera clear  NECK: Supple, No JVD  CHEST/LUNG: Clear to auscultation bilaterally; No wheeze  HEART: Regular rate and rhythm; No murmurs, rubs, or gallops  ABDOMEN: Soft, Nontender, Nondistended; Bowel sounds present  EXTREMITIES:  2+ Peripheral Pulses, No clubbing, cyanosis, or edema  PSYCH: AAOx3  NEUROLOGY: non-focal  SKIN: No rashes or lesions                             16.1   7.83  )-----------( 240      ( 06 Nov 2020 06:01 )             45.7       CARDIAC MARKERS ( 05 Nov 2020 23:13 )  x     / x     / 60 u/L / 1.97 ng/mL / x          LIVER FUNCTIONS - ( 05 Nov 2020 10:22 )  Alb: 4.4 g/dL / Pro: 6.8 g/dL / ALK PHOS: 115 u/L / ALT: 11 u/L / AST: 15 u/L / GGT: x           PT/INR - ( 05 Nov 2020 10:00 )   PT: 11.9 SEC;   INR: 1.04          PTT - ( 05 Nov 2020 10:00 )  PTT:37.6 SEC  144|109|17<140  4.3|27|1.03  9.4,--,--  11-06 @ 14:06  143|106|18<181  2.7|25|1.03  9.1,2.4,3.0  11-06 @ 06:01      RADIOLOGY & ADDITIONAL TESTS:    Imaging Personally Reviewed:    Consultant(s) Notes Reviewed:      Care Discussed with Consultants/Other Providers:

## 2020-11-07 NOTE — DISCHARGE NOTE PROVIDER - HOSPITAL COURSE
78 y/o male with PMHx of HTN, DM type 2, HLD, and Depression who presented to the ED for abnormal EKG    Unstable angina- Left arm pain, Flattening of T waves on ekg  - Tele, CXR- Clear lungs, Trop- neg, Urine tox----negative, TTE- EF 50%, Normal, mild diastolic dysfunction   - Card consulted- s/p cardiac cath, continue with Aspirin, statins and low dose Beta blockers  - 11/6 LHC- LAD 50%. LCx 95% s/p CARMEN x 1. pRCA 95% s/p CARMEN x 1. RRA access. ASA, Plavix, statin, Metoprolol    Essential hypertension- s/p Hydralazine x 1 dose in the ED   - continue with Norvasc, increased Metoprolol to 25 BID    Type 2 diabetes mellitus without complication, without long-term current use of insulin   - HISS, HgA1C -7.7%, ISS, monitor FS     Dispo -home with outpatient follow up 80 y/o male with PMHx of HTN, DM type 2, HLD, and Depression who presented to the ED for abnormal EKG    Unstable angina- Left arm pain, Flattening of T waves on ekg  - Tele, CXR- Clear lungs, Trop- neg, Urine tox----negative, TTE- EF 50%, Normal, mild diastolic dysfunction   - Card consulted- s/p cardiac cath, continue with Aspirin, statins and low dose Beta blockers  - 11/6 LHC- LAD 50%. LCx 95% s/p CARMEN x 1. pRCA 95% s/p CARMEN x 1. RRA access. ASA, Plavix, statin, Metoprolol    Essential hypertension- s/p Hydralazine x 1 dose in the ED   - continue with Norvasc, increased Metoprolol to 25 BID    Type 2 diabetes mellitus without complication, without long-term current use of insulin   - HISS, HgA1C -7.7%, ISS, monitor FS, continue home meds     Dispo -home with outpatient follow up

## 2020-11-07 NOTE — DISCHARGE NOTE PROVIDER - NSDCCPCAREPLAN_GEN_ALL_CORE_FT
PRINCIPAL DISCHARGE DIAGNOSIS  Diagnosis: Unstable angina  Assessment and Plan of Treatment: You had TTE done showed EF 50%, Normal, mild diastolic dysfunction. You were seen by Mike North had cardiac cath done had 2 stents placed  - continue taking Aspirin, started Plavix, continue statins and low dose Metoprolol  - Follow up with Mike North as outpatient for further management      SECONDARY DISCHARGE DIAGNOSES  Diagnosis: Essential hypertension  Assessment and Plan of Treatment: Continue current blood pressure medication regimen as directed. Monitor for any visual changes, headaches or dizziness.  Monitor blood pressure regularly.  Follow up with your PCP for further management for high blood pressure, please call to make appointment within 1 week of discharge    Diagnosis: Type 2 diabetes mellitus without complication, without long-term current use of insulin  Assessment and Plan of Treatment: Your HgA1C level 7.7%. Monitor finger sticks pre-meal and bedtime, low salt, fat and carbohydrate diet, minimize glucose intake.  Exercise daily for at least 30 minutes and weight loss.  Follow up with primary care physician and endocrinologist for routine Hemoglobin A1C checks and management.  Follow up with your ophthalmologist for routine yearly vision exams     PRINCIPAL DISCHARGE DIAGNOSIS  Diagnosis: Unstable angina  Assessment and Plan of Treatment: You had TTE done showed EF 50%, Normal, mild diastolic dysfunction. You were seen by Mike North had cardiac cath done had 2 stents placed  - continue taking Aspirin, started Plavix, continue statins and low dose Metoprolol. Monitor for signs of bleeding  - Follow up with Mike North as outpatient for further management      SECONDARY DISCHARGE DIAGNOSES  Diagnosis: Essential hypertension  Assessment and Plan of Treatment: Continue current blood pressure medication regimen as directed. Monitor for any visual changes, headaches or dizziness.  Monitor blood pressure regularly.  Follow up with your PCP for further management for high blood pressure, please call to make appointment within 1 week of discharge    Diagnosis: Type 2 diabetes mellitus without complication, without long-term current use of insulin  Assessment and Plan of Treatment: Your HgA1C level 7.7%. Monitor finger sticks pre-meal and bedtime, low salt, fat and carbohydrate diet, minimize glucose intake.  Exercise daily for at least 30 minutes and weight loss.  Follow up with primary care physician and endocrinologist for routine Hemoglobin A1C checks and management.  Follow up with your ophthalmologist for routine yearly vision exams

## 2020-11-07 NOTE — DISCHARGE NOTE PROVIDER - NSDCMRMEDTOKEN_GEN_ALL_CORE_FT
amLODIPine 10 mg oral tablet: 1 tab(s) orally once a day  aspirin 81 mg oral delayed release tablet: 1 tab(s) orally once a day  atorvastatin 40 mg oral tablet: 1 tab(s) orally once a day  citalopram 20 mg oral tablet: orally once a day (at bedtime)  clopidogrel 75 mg oral tablet: 1 tab(s) orally once a day  indapamide 1.25 mg oral tablet: 1 tab(s) orally once a day (in the morning)  Jardiance 25 mg oral tablet: 1 tab(s) orally once a day (in the morning)  metFORMIN 500 mg oral tablet, extended release: 2 tab(s) orally 2 times a day  metoprolol tartrate 25 mg oral tablet: 1 tab(s) orally 2 times a day  omeprazole 20 mg oral delayed release capsule: 1 cap(s) orally once a day  potassium citrate 10 mEq oral tablet, extended release: 1 tab(s) orally once a day  ProAir HFA 90 mcg/inh inhalation aerosol: 2 puff(s) inhaled once a day  tamsulosin 0.4 mg oral capsule: 1 cap(s) orally once a day  Toviaz 4 mg oral tablet, extended release: 1 tab(s) orally once a day  Xanax 0.5 mg oral tablet: 1 tab(s) orally 2 times a day   amLODIPine 10 mg oral tablet: 1 tab(s) orally once a day  aspirin 81 mg oral delayed release tablet: 1 tab(s) orally once a day  atorvastatin 40 mg oral tablet: 1 tab(s) orally once a day  citalopram 20 mg oral tablet: 1 tab(s) orally once a day (at bedtime)  clopidogrel 75 mg oral tablet: 1 tab(s) orally once a day  Jardiance 25 mg oral tablet: 1 tab(s) orally once a day (in the morning)  metFORMIN 500 mg oral tablet, extended release: 2 tab(s) orally 2 times a day  metoprolol tartrate 25 mg oral tablet: 1 tab(s) orally 2 times a day  omeprazole 20 mg oral delayed release capsule: 1 cap(s) orally once a day  ProAir HFA 90 mcg/inh inhalation aerosol: 2 puff(s) inhaled once a day  tamsulosin 0.4 mg oral capsule: 1 cap(s) orally once a day  Toviaz 4 mg oral tablet, extended release: 1 tab(s) orally once a day  Xanax 0.5 mg oral tablet: 1 tab(s) orally 2 times a day

## 2020-11-07 NOTE — DISCHARGE NOTE PROVIDER - CARE PROVIDER_API CALL
Lewis North (MD)  Cardiovascular Disease; Internal Medicine; Interventional Cardiology  [address] [place] [address]  Phone: [phone]  Fax: [phone]  Follow Up Time:     pcp,   Phone: (   )    -  Fax: (   )    -  Follow Up Time:

## 2020-11-16 PROBLEM — Z00.00 ENCOUNTER FOR PREVENTIVE HEALTH EXAMINATION: Status: ACTIVE | Noted: 2020-11-16

## 2020-11-17 ENCOUNTER — APPOINTMENT (OUTPATIENT)
Dept: CARDIOLOGY | Facility: CLINIC | Age: 80
End: 2020-11-17
Payer: MEDICARE

## 2020-11-17 ENCOUNTER — NON-APPOINTMENT (OUTPATIENT)
Age: 80
End: 2020-11-17

## 2020-11-17 VITALS
OXYGEN SATURATION: 97 % | HEART RATE: 75 BPM | DIASTOLIC BLOOD PRESSURE: 72 MMHG | RESPIRATION RATE: 14 BRPM | BODY MASS INDEX: 32.15 KG/M2 | WEIGHT: 193 LBS | SYSTOLIC BLOOD PRESSURE: 113 MMHG | HEIGHT: 65 IN

## 2020-11-17 VITALS
BODY MASS INDEX: 32.15 KG/M2 | OXYGEN SATURATION: 96 % | HEART RATE: 75 BPM | SYSTOLIC BLOOD PRESSURE: 113 MMHG | WEIGHT: 193 LBS | HEIGHT: 65 IN | DIASTOLIC BLOOD PRESSURE: 72 MMHG

## 2020-11-17 PROCEDURE — 99072 ADDL SUPL MATRL&STAF TM PHE: CPT

## 2020-11-17 PROCEDURE — 99215 OFFICE O/P EST HI 40 MIN: CPT

## 2020-11-17 PROCEDURE — 93000 ELECTROCARDIOGRAM COMPLETE: CPT

## 2020-11-17 RX ORDER — EMPAGLIFLOZIN 25 MG/1
25 TABLET, FILM COATED ORAL
Qty: 90 | Refills: 0 | Status: ACTIVE | COMMUNITY
Start: 2019-12-12

## 2020-11-17 RX ORDER — CLOPIDOGREL BISULFATE 75 MG/1
75 TABLET, FILM COATED ORAL
Qty: 30 | Refills: 0 | Status: ACTIVE | COMMUNITY
Start: 2020-11-07

## 2020-11-17 RX ORDER — ATORVASTATIN CALCIUM 40 MG/1
40 TABLET, FILM COATED ORAL
Qty: 90 | Refills: 0 | Status: ACTIVE | COMMUNITY
Start: 2020-09-02

## 2020-11-17 RX ORDER — TAMSULOSIN HYDROCHLORIDE 0.4 MG/1
0.4 CAPSULE ORAL
Qty: 90 | Refills: 0 | Status: ACTIVE | COMMUNITY
Start: 2020-03-16

## 2020-11-17 RX ORDER — INDAPAMIDE 1.25 MG/1
1.25 TABLET, FILM COATED ORAL
Qty: 90 | Refills: 0 | Status: ACTIVE | COMMUNITY
Start: 2020-03-16

## 2020-11-17 RX ORDER — OMEPRAZOLE 20 MG/1
20 CAPSULE, DELAYED RELEASE ORAL
Qty: 90 | Refills: 0 | Status: ACTIVE | COMMUNITY
Start: 2020-07-22

## 2020-11-17 NOTE — HISTORY OF PRESENT ILLNESS
[FreeTextEntry1] : William is 79 years old with a history of hypertension and hypertension and mild hyperlipidemia.  He is otherwise healthy and has not had any significant hospitalizations.  He is a non-smoker.  There is no significant family history of coronary artery disease\par \par He presented to his internist Dr. Ricky Kwok with severe left arm pain and new EKG changes in the lateral leads.  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  He denies chest pain\par \par He lost his wife over 2 years ago which is created great sadness for him depression and anxiety.\par \par He was admitted to [facility] with a diagnosis of unstable angina.  ECG showed some nonspecific T wave changes in the lateral leads enzymes were normal\par \par He underwent cardiac catheterization approximately 1 week ago.  LV function was normal.  He had severe disease of the circumflex and right coronary artery and the LAD had luminal disease.  He underwent successful stenting of the circumflex and right coronary with drug-eluting stents.  He was discharged the following day.  He did have chest pressure during the inflations but no significant left arm pain\par \par Since discharge she has felt well without chest pain chest pressure left arm pain or shortness of breath\par \par EKG today is within normal limits with some minor nonspecific ST-T wave changes

## 2020-11-17 NOTE — PHYSICAL EXAM
[General Appearance - Well Developed] : well developed [Normal Appearance] : normal appearance [General Appearance - Well Nourished] : well nourished [General Appearance - In No Acute Distress] : no acute distress [Normal Conjunctiva] : the conjunctiva exhibited no abnormalities [No Jugular Venous Sanchez A Waves] : no jugular venous sanchez A waves [Heart Sounds] : normal S1 and S2 [Arterial Pulses Normal] : the arterial pulses were normal [Respiration, Rhythm And Depth] : normal respiratory rhythm and effort [Auscultation Breath Sounds / Voice Sounds] : lungs were clear to auscultation bilaterally [Abdomen Soft] : soft [Abdomen Tenderness] : non-tender [FreeTextEntry1] : Mildly protuberant [Oriented To Time, Place, And Person] : oriented to person, place, and time [Affect] : the affect was normal [Mood] : the mood was normal

## 2020-11-17 NOTE — ASSESSMENT
[FreeTextEntry1] : William Avendano has risk factors of coronary disease of hypertension hyperlipidemia and diabetes type 2.  He is status post an episode of unstable angina with left arm pain status post PTCI of the left circumflex and right coronary with drug-eluting stents.  He is clinically stable without angina in good spirits good exercise tolerance without shortness of breath chest pain or palpitations.  He is not certain exactly which medicines he is taking but he is taking Plavix aspirin and Lipitor probably 40.  He is on a beta-blocker metoprolol 25 twice daily\par \par

## 2020-11-17 NOTE — REASON FOR VISIT
[FreeTextEntry1] : Madhavi Juan Alberto 79 years old status post recent admission to [facility] for unstable angina left arm pain status post PTCI of the circumflex and right coronary artery

## 2020-11-17 NOTE — DISCUSSION/SUMMARY
[FreeTextEntry1] : 1.  Continue present regimen of medications patient will call the office to make certain that the medicines that are listed are correct particularly whether we stop the amlodipine or the indapamide as he said that Dr. Randall stopped 1 of these medications\par \par 2.  I emphasized the importance of him continuing on the aspirin Plavix Lipitor and beta-blocker\par \par 3.  I encouraged good prudent diet weight reduction and exercise\par \par Overall clinically he is doing very well and will follow with me again in 3 months

## 2020-11-19 RX ORDER — ASPIRIN ENTERIC COATED TABLETS 81 MG 81 MG/1
81 TABLET, DELAYED RELEASE ORAL DAILY
Qty: 90 | Refills: 2 | Status: ACTIVE | COMMUNITY
Start: 2020-11-19

## 2020-11-19 RX ORDER — METFORMIN HYDROCHLORIDE 500 MG/1
500 TABLET, COATED ORAL TWICE DAILY
Qty: 360 | Refills: 0 | Status: ACTIVE | COMMUNITY
Start: 2020-11-19

## 2021-02-18 ENCOUNTER — NON-APPOINTMENT (OUTPATIENT)
Age: 81
End: 2021-02-18

## 2021-02-18 ENCOUNTER — APPOINTMENT (OUTPATIENT)
Dept: CARDIOLOGY | Facility: CLINIC | Age: 81
End: 2021-02-18
Payer: MEDICARE

## 2021-02-18 VITALS
HEART RATE: 58 BPM | SYSTOLIC BLOOD PRESSURE: 110 MMHG | OXYGEN SATURATION: 97 % | WEIGHT: 192 LBS | DIASTOLIC BLOOD PRESSURE: 78 MMHG | BODY MASS INDEX: 31.95 KG/M2

## 2021-02-18 PROCEDURE — 99072 ADDL SUPL MATRL&STAF TM PHE: CPT

## 2021-02-18 PROCEDURE — 99214 OFFICE O/P EST MOD 30 MIN: CPT

## 2021-02-18 PROCEDURE — 93000 ELECTROCARDIOGRAM COMPLETE: CPT

## 2021-02-18 NOTE — HISTORY OF PRESENT ILLNESS
[FreeTextEntry1] : William is 79 years old with a history of hypertension and hypertension and mild hyperlipidemia.  He is otherwise healthy and has not had any significant hospitalizations.  He is a non-smoker.  There is no significant family history of coronary artery disease\par \par He presented to his internist Dr. Ricky Kwok with severe left arm pain and new EKG changes in the lateral leads.  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  He denies chest pain\par \par He lost his wife over 2 years ago which is created great sadness for him depression and anxiety.\par \par He was admitted to [facility] with a diagnosis of unstable angina.  ECG showed some nonspecific T wave changes in the lateral leads enzymes were normal\par \par He underwent cardiac catheterization approximately 1 week ago.  LV function was normal.  He had severe disease of the circumflex and right coronary artery and the LAD had luminal disease.  He underwent successful stenting of the circumflex and right coronary with drug-eluting stents.  He was discharged the following day.  He did have chest pressure during the inflations but no significant left arm pain\par \par Since discharge she has felt well without chest pain chest pressure left arm pain or shortness of breath\par \par It is now over 3 months post PTCI of the circumflex and right coronary artery.  He remains asymptomatic without chest pain arm pain shortness of breath palpitations dizziness or syncope.  He is in better spirits than he was when he was admitted with unstable angina.\par \par He is tolerating his present regimen of medications\par \par EKG today reveals a sinus bradycardia with first-degree AV block (patient is on metoprolol 25 twice daily) otherwise it is within normal limits

## 2021-02-18 NOTE — ASSESSMENT
[FreeTextEntry1] : William Avendano has risk factors of coronary disease of hypertension hyperlipidemia and diabetes type 2.  He is status post an episode of unstable angina with left arm pain status post PTCI of the left circumflex and right coronary with drug-eluting stents.  He is clinically stable without angina in good spirits good exercise tolerance without shortness of breath chest pain or palpitations.  He is tolerating his present regimen of medications but does have a sinus bradycardia though his heart rate with mild exercise does increased to over 60 beats a minute.\par \par 1.  Chronic ischemic heart disease status post PTCI of the circumflex and right coronary no recurrence of arm pain or chest pain\par \par 2.  Hyperlipidemia on statin\par \par 3.  Depression and anxiety markedly improved since his original admission in good spirits\par

## 2021-02-18 NOTE — DISCUSSION/SUMMARY
[FreeTextEntry1] : Patient will continue on his present regimen of medications.  If his heart rate remains below 50, I would consider changing his metoprolol to Toprol-XL 25.\par \par I encouraged him to continue to exercise, watch his caloric intake and continue his present medications\par \par No further cardiac work-up is needed\par Patient will follow up with Dr. Ricky Kwok

## 2021-02-18 NOTE — PHYSICAL EXAM
[General Appearance - Well Developed] : well developed [Normal Appearance] : normal appearance [General Appearance - Well Nourished] : well nourished [General Appearance - In No Acute Distress] : no acute distress [Normal Conjunctiva] : the conjunctiva exhibited no abnormalities [No Jugular Venous Sanchez A Waves] : no jugular venous sanchez A waves [Respiration, Rhythm And Depth] : normal respiratory rhythm and effort [Auscultation Breath Sounds / Voice Sounds] : lungs were clear to auscultation bilaterally [Heart Sounds] : normal S1 and S2 [Arterial Pulses Normal] : the arterial pulses were normal [Abdomen Soft] : soft [Abdomen Tenderness] : non-tender [Cyanosis, Localized] : no localized cyanosis [Oriented To Time, Place, And Person] : oriented to person, place, and time [Affect] : the affect was normal [Mood] : the mood was normal [FreeTextEntry1] : In good spirits

## 2021-02-18 NOTE — REASON FOR VISIT
[FreeTextEntry1] : William Avendano 80 years old status post PTCI of the circumflex and right coronary for follow-up of his cardiac status

## 2021-04-15 ENCOUNTER — RX RENEWAL (OUTPATIENT)
Age: 81
End: 2021-04-15

## 2021-04-15 RX ORDER — ALPRAZOLAM 0.25 MG/1
0.25 TABLET ORAL
Qty: 60 | Refills: 0 | Status: ACTIVE | COMMUNITY
Start: 2020-08-01 | End: 1900-01-01

## 2021-05-20 NOTE — ED PROVIDER NOTE - CPE EDP GASTRO NORM
Patient notified via [employer] message.  
Severe OA bilaterally, L worse than R, loose body in the L knee (Ok to observe)
normal...

## 2021-06-24 ENCOUNTER — NON-APPOINTMENT (OUTPATIENT)
Age: 81
End: 2021-06-24

## 2021-06-24 ENCOUNTER — APPOINTMENT (OUTPATIENT)
Dept: CARDIOLOGY | Facility: CLINIC | Age: 81
End: 2021-06-24
Payer: MEDICARE

## 2021-06-24 VITALS
BODY MASS INDEX: 33.49 KG/M2 | DIASTOLIC BLOOD PRESSURE: 79 MMHG | SYSTOLIC BLOOD PRESSURE: 126 MMHG | OXYGEN SATURATION: 95 % | HEART RATE: 65 BPM | WEIGHT: 201 LBS | HEIGHT: 65 IN

## 2021-06-24 PROCEDURE — 99072 ADDL SUPL MATRL&STAF TM PHE: CPT

## 2021-06-24 PROCEDURE — 99214 OFFICE O/P EST MOD 30 MIN: CPT

## 2021-06-24 PROCEDURE — 93000 ELECTROCARDIOGRAM COMPLETE: CPT

## 2021-06-24 NOTE — ASSESSMENT
[FreeTextEntry1] : William Avendano has risk factors of coronary disease of hypertension hyperlipidemia and diabetes type 2.  He is status post an episode of unstable angina with left arm pain status post PTCI of the left circumflex and right coronary with drug-eluting stents.  He is clinically stable without angina in good spirits good exercise tolerance without shortness of breath chest pain or palpitations.  He is tolerating his present regimen of medications but does have a sinus bradycardia though his heart rate with mild exercise does increased to over 60 beats a minute.  Today he remains stable without any symptoms stable blood pressure and heart rate and EKG\par \par 1.  Chronic ischemic heart disease status post PTCI of the circumflex and right coronary no recurrence of arm pain or chest pain\par \par 2.  Hyperlipidemia on statin\par \par 3.  Depression and anxiety markedly improved since his original admission in good spirits\par

## 2021-06-24 NOTE — DISCUSSION/SUMMARY
[FreeTextEntry1] : 1.  I encouraged increased level of exercise and weight reduction\par \par 2.  Continue present regimen of medications\par \par 3.  Routine follow with me again in 4 months\par \par Overall the patient is doing extremely well status post PTCI

## 2021-06-24 NOTE — PHYSICAL EXAM
[General Appearance - Well Developed] : well developed [Normal Appearance] : normal appearance [General Appearance - Well Nourished] : well nourished [General Appearance - In No Acute Distress] : no acute distress [Normal Conjunctiva] : the conjunctiva exhibited no abnormalities [No Jugular Venous Sanchez A Waves] : no jugular venous sancehz A waves [Respiration, Rhythm And Depth] : normal respiratory rhythm and effort [Auscultation Breath Sounds / Voice Sounds] : lungs were clear to auscultation bilaterally [Heart Sounds] : normal S1 and S2 [Arterial Pulses Normal] : the arterial pulses were normal [Abdomen Soft] : soft [Abdomen Tenderness] : non-tender [Cyanosis, Localized] : no localized cyanosis [Oriented To Time, Place, And Person] : oriented to person, place, and time [Affect] : the affect was normal [Mood] : the mood was normal [FreeTextEntry1] : In good spirits

## 2021-06-24 NOTE — HISTORY OF PRESENT ILLNESS
[FreeTextEntry1] : William is 79 years old with a history of hypertension and hypertension and mild hyperlipidemia.  He is otherwise healthy and has not had any significant hospitalizations.  He is a non-smoker.  There is no significant family history of coronary artery disease\par \par To review, he presented to his internist Dr. Ricky Kwok with severe left arm pain and new EKG changes in the lateral leads.  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  He denies chest pain\par \par He lost his wife over 2 years ago which is created great sadness for him depression and anxiety.\par \par He was admitted to [facility] with a diagnosis of unstable angina.  ECG showed some nonspecific T wave changes in the lateral leads enzymes were normal\par \par He underwent cardiac catheterization approximately 1 week ago.  LV function was normal.  He had severe disease of the circumflex and right coronary artery and the LAD had luminal disease.  He underwent successful stenting of the circumflex and right coronary with drug-eluting stents.  He was discharged the following day.  He did have chest pressure during the inflations but no significant left arm pain\par \par Since discharge she has felt well without chest pain chest pressure left arm pain or shortness of breath\par \par It is now over 3 months post PTCI of the circumflex and right coronary artery.  He remains asymptomatic without chest pain arm pain shortness of breath palpitations dizziness or syncope.  He is in better spirits than he was when he was admitted with unstable angina.\par \par He is tolerating his present regimen of medications\par \par He continues to feel well without chest pain chest pressure back pain or shortness of breath.  He is much less depressed though complains of sleeping a good deal during the day though does not bother him.\par \par He does not exercise that much but what ever activity he does he has no cardiac symptoms\par EKG today is unchanged from prior EKG

## 2021-06-24 NOTE — REASON FOR VISIT
[FreeTextEntry1] : William Avendano  80 years old status post PTCI of the right coronary and circumflex here for follow-up of his cardiac status

## 2021-08-31 ENCOUNTER — APPOINTMENT (OUTPATIENT)
Dept: CARDIOLOGY | Facility: CLINIC | Age: 81
End: 2021-08-31
Payer: MEDICARE

## 2021-08-31 VITALS
OXYGEN SATURATION: 96 % | DIASTOLIC BLOOD PRESSURE: 82 MMHG | BODY MASS INDEX: 33.78 KG/M2 | WEIGHT: 203 LBS | HEART RATE: 73 BPM | SYSTOLIC BLOOD PRESSURE: 152 MMHG

## 2021-08-31 PROCEDURE — 99213 OFFICE O/P EST LOW 20 MIN: CPT

## 2021-08-31 NOTE — PHYSICAL EXAM
[General Appearance - Well Developed] : well developed [Normal Appearance] : normal appearance [General Appearance - Well Nourished] : well nourished [General Appearance - In No Acute Distress] : no acute distress [Normal Conjunctiva] : the conjunctiva exhibited no abnormalities [No Jugular Venous Sanchez A Waves] : no jugular venous sanchez A waves [Respiration, Rhythm And Depth] : normal respiratory rhythm and effort [Auscultation Breath Sounds / Voice Sounds] : lungs were clear to auscultation bilaterally [Heart Sounds] : normal S1 and S2 [Arterial Pulses Normal] : the arterial pulses were normal [Abdomen Soft] : soft [Abdomen Tenderness] : non-tender [Cyanosis, Localized] : no localized cyanosis [Oriented To Time, Place, And Person] : oriented to person, place, and time [Mood] : the mood was normal [Affect] : the affect was normal [FreeTextEntry1] : In good spirits

## 2021-08-31 NOTE — HISTORY OF PRESENT ILLNESS
[FreeTextEntry1] : William is 79 years old with a history of hypertension and hypertension and mild hyperlipidemia.  He is otherwise healthy and has not had any significant hospitalizations.  He is a non-smoker.  There is no significant family history of coronary artery disease\par \par To review, he presented to his internist Dr. Ricky Kwok with severe left arm pain and new EKG changes in the lateral leads.  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  He denies chest pain\par \par He lost his wife over 2 years ago which is created great sadness for him depression and anxiety.\par \par He was admitted to [facility] with a diagnosis of unstable angina.  ECG showed some nonspecific T wave changes in the lateral leads enzymes were normal\par \par He underwent cardiac catheterization approximately 1.5 years ago.  LV function was normal.  He had severe disease of the circumflex and right coronary artery and the LAD had luminal disease.  He underwent successful stenting of the circumflex and right coronary with drug-eluting stents.  He was discharged the following day.  He did have chest pressure during the inflations but no significant left arm pain\par \par Since discharge he has felt well without chest pain chest pressure left arm pain or shortness of breath\par \par It is now over 3 months post PTCI of the circumflex and right coronary artery.  He remains asymptomatic without chest pain arm pain shortness of breath palpitations dizziness or syncope.  He is in better spirits than he was when he was admitted with unstable angina.\par \par He is tolerating his present regimen of medications\par \par He continues to feel well without chest pain chest pressure back pain or shortness of breath.  He is much less depressed though complains of sleeping a good deal during the day though does not bother him.\par \par He does not exercise that much but what ever activity he does he has no cardiac symptoms\par He continues to do well but is now in need of dental work.  He is still on Plavix

## 2021-08-31 NOTE — DISCUSSION/SUMMARY
[FreeTextEntry1] : William should continue on his present regimen of medications.  His cardiac status is stable.  He he should now make greater efforts to decrease his caloric intake and lose weight as he does have significant abdominal obesity.  He was advised to again be very careful with the salt in his diet\par \par Prior to the dental work, he can stop Plavix for at least 5 days and then restarted after the procedure

## 2021-08-31 NOTE — ASSESSMENT
[FreeTextEntry1] : William Avendano has risk factors of coronary disease of hypertension hyperlipidemia and diabetes type 2.  He is status post an episode of unstable angina with left arm pain status post PTCI of the left circumflex and right coronary with drug-eluting stents.  He is clinically stable without angina in good spirits good exercise tolerance without shortness of breath chest pain or palpitations.  He is tolerating his present regimen of medications but does have a sinus bradycardia though his heart rate with mild exercise does increased to over 60 beats a minute.  Today he remains stable without any symptoms stable blood pressure and heart rate and EKG.  His blood pressure is somewhat elevated today.  He has gained weight\par \par 1.  Chronic ischemic heart disease status post PTCI of the circumflex and right coronary no recurrence of arm pain or chest pain\par \par 2.  Hyperlipidemia on statin\par \par 3.  Depression and anxiety markedly improved since his original admission in good spirits\par \par 4.  Hypertension today not ideally controlled patient has gained weight\par

## 2021-10-20 ENCOUNTER — NON-APPOINTMENT (OUTPATIENT)
Age: 81
End: 2021-10-20

## 2021-10-20 ENCOUNTER — APPOINTMENT (OUTPATIENT)
Dept: CARDIOLOGY | Facility: CLINIC | Age: 81
End: 2021-10-20
Payer: MEDICARE

## 2021-10-20 VITALS
DIASTOLIC BLOOD PRESSURE: 68 MMHG | BODY MASS INDEX: 33.32 KG/M2 | OXYGEN SATURATION: 96 % | HEIGHT: 65 IN | HEART RATE: 87 BPM | SYSTOLIC BLOOD PRESSURE: 99 MMHG | WEIGHT: 200 LBS

## 2021-10-20 PROCEDURE — 93000 ELECTROCARDIOGRAM COMPLETE: CPT

## 2021-10-20 PROCEDURE — 99214 OFFICE O/P EST MOD 30 MIN: CPT

## 2021-10-20 NOTE — ASSESSMENT
[FreeTextEntry1] : William Avendano has risk factors of coronary disease of hypertension hyperlipidemia and diabetes type 2.  He is status post an episode of unstable angina with left arm pain status post PTCI of the left circumflex and right coronary with drug-eluting stents.  He is clinically stable without angina in good spirits good exercise tolerance without shortness of breath chest pain or palpitations.  He is tolerating his present regimen of medications but does have a sinus bradycardia though his heart rate with mild exercise does increased to over 60 beats a minute.  Today he remains stable without any symptoms stable blood pressure and heart rate and EKG.  His blood pressure is somewhat elevated today.  He has gained weight\par \par 1.  Chronic ischemic heart disease status post PTCI of the circumflex and right coronary no recurrence of arm pain or chest pain\par \par 2.  Hyperlipidemia on statin\par \par 3.  Depression and anxiety markedly improved since his original admission in good spirits\par \par 4.  Hypertension today blood pressure is too well controlled as his blood pressure is  systolic.  He is asymptomatic without dizziness but he is on amlodipine 10 metoprolol 25 twice daily\par

## 2021-10-20 NOTE — DISCUSSION/SUMMARY
[FreeTextEntry1] : William should decrease his amlodipine to 5 mg a day.  I sent a prescription to his pharmacy.  I did discuss with him but he seems to be somewhat confused about his medications.  He will continue on his other medications.  No further cardiac work-up is needed.  I again discussed with him the importance of weight reduction which he does understand and he will follow-up with Dr. Ricky Kwok his internist

## 2021-10-20 NOTE — HISTORY OF PRESENT ILLNESS
[FreeTextEntry1] : William is 79 years old with a history of hypertension and hypertension and mild hyperlipidemia.  He is otherwise healthy and has not had any significant hospitalizations.  He is a non-smoker.  There is no significant family history of coronary artery disease\par \par To review, he presented to his internist Dr. Ricky Kwok with severe left arm pain and new EKG changes in the lateral leads.  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  He denies chest pain\par \par He lost his wife over 2 years ago which is created great sadness for him depression and anxiety.\par \par He was admitted to [facility] with a diagnosis of unstable angina.  ECG showed some nonspecific T wave changes in the lateral leads enzymes were normal\par \par He underwent cardiac catheterization approximately 1.5 years ago.  LV function was normal.  He had severe disease of the circumflex and right coronary artery and the LAD had luminal disease.  He underwent successful stenting of the circumflex and right coronary with drug-eluting stents.  He was discharged the following day.  He did have chest pressure during the inflations but no significant left arm pain\par \par Since discharge he has felt well without chest pain chest pressure left arm pain or shortness of breath\par \par He continues to feel well without chest pain chest pressure shortness of breath.  He seems to be somewhat confused about his medications on last visit his blood pressure was over 150\par \par He denies chest pain chest pressure shortness of breath.  He denies dizziness or syncope\par Blood pressure today is only 100/70\par EKG is normal sinus rhythm with nonspecific ST-T wave changes and is unchanged\par \par He is tolerating his present regimen of medications\par \par He continues to feel well without chest pain chest pressure back pain or shortness of breath.  He is much less depressed though complains of sleeping a good deal during the day though does not bother him.\par \par He does not exercise that much but what ever activity he does he has no cardiac symptoms\par He continues to do well but is now in need of dental work.  He is still on Plavix

## 2021-10-21 ENCOUNTER — RX RENEWAL (OUTPATIENT)
Age: 81
End: 2021-10-21

## 2021-12-07 ENCOUNTER — APPOINTMENT (OUTPATIENT)
Dept: CARDIOLOGY | Facility: CLINIC | Age: 81
End: 2021-12-07
Payer: MEDICARE

## 2021-12-07 VITALS
OXYGEN SATURATION: 95 % | BODY MASS INDEX: 33.49 KG/M2 | HEART RATE: 60 BPM | HEIGHT: 65 IN | SYSTOLIC BLOOD PRESSURE: 111 MMHG | DIASTOLIC BLOOD PRESSURE: 80 MMHG | WEIGHT: 201 LBS

## 2021-12-07 PROCEDURE — 99214 OFFICE O/P EST MOD 30 MIN: CPT

## 2021-12-07 NOTE — ASSESSMENT
[FreeTextEntry1] : William Avendano has risk factors of coronary disease of hypertension hyperlipidemia and diabetes type 2.  He is status post an episode of unstable angina with left arm pain status post PTCI of the left circumflex and right coronary with drug-eluting stents.  He is clinically stable without angina in good spirits good exercise tolerance without shortness of breath chest pain or palpitations.  He is tolerating his present regimen of medications but does have a sinus bradycardia though his heart rate with mild exercise does increased to over 60 beats a minute.  Today he remains stable without any symptoms stable blood pressure and heart rate and EKG.  His blood pressure is somewhat elevated today.  He has gained weight\par \par 1.  Chronic ischemic heart disease status post PTCI of the circumflex and right coronary no recurrence of arm pain or chest pain\par \par 2.  Hyperlipidemia on statin\par \par 3.  Depression and anxiety markedly improved since his original admission in good spirits\par \par 4.  Hypertension today blood pressure is too well controlled as his blood pressure is  systolic.  Today his blood pressure is 112/70 and he does complain of some nonspecific dizziness at times\par

## 2021-12-07 NOTE — HISTORY OF PRESENT ILLNESS
[FreeTextEntry1] : William is 79 years old with a history of hypertension  and mild hyperlipidemia.  He is otherwise healthy and has not had any significant hospitalizations.  He is a non-smoker.  There is no significant family history of coronary artery disease\par \par To review, he presented to his internist Dr. Ricky Kwok with severe left arm pain and new EKG changes in the lateral leads.  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  He denies chest pain\par \par He lost his wife over 2 years ago which is created great sadness for him depression and anxiety.\par \par He was admitted to [facility] with a diagnosis of unstable angina.  ECG showed some nonspecific T wave changes in the lateral leads enzymes were normal\par \par He underwent cardiac catheterization approximately 1.5 years ago.  LV function was normal.  He had severe disease of the circumflex and right coronary artery and the LAD had luminal disease.  He underwent successful stenting of the circumflex and right coronary with drug-eluting stents.  He was discharged the following day.  He did have chest pressure during the inflations but no significant left arm pain\par \par Since discharge he has felt well without chest pain chest pressure left arm pain or shortness of breath\par \par He continues to feel well without chest pain chest pressure shortness of breath.  He seems to be somewhat confused about his medications on last visit his blood pressure was over 150\par \par He denies chest pain chest pressure shortness of breath.  He denies dizziness or syncope\par Blood pressure today is only 100/70\par EKG is normal sinus rhythm with nonspecific ST-T wave changes and is unchanged\par \par He is tolerating his present regimen of medications\par \par He continues to feel well without chest pain chest pressure back pain or shortness of breath.  He is much less depressed though complains of sleeping a good deal during the day though does not bother him.\par \par He does not exercise that much but what ever activity he does he has no cardiac symptoms\par He continues to do well but is now in need of dental work.  He is still on Plavix\par \par He denies shortness of breath chest pain chest pressure palpitations.  He does feel loss of balance and is certain dizziness.  On last visit his blood pressure was on the low side.  He is not clear exactly what he is taking

## 2021-12-07 NOTE — DISCUSSION/SUMMARY
[FreeTextEntry1] : On last visit, we decrease his amlodipine to 5 mg a day.  I think at this point he probably can stop the amlodipine as his blood pressure is on the quite low side and he does have symptoms of dizziness.  He is otherwise stable without any significant issues except being depressed over the loss of his wife over 2 years ago\par \par Routine follow with me again in 4 months

## 2022-01-19 ENCOUNTER — APPOINTMENT (OUTPATIENT)
Dept: CARDIOLOGY | Facility: CLINIC | Age: 82
End: 2022-01-19
Payer: MEDICARE

## 2022-01-19 ENCOUNTER — NON-APPOINTMENT (OUTPATIENT)
Age: 82
End: 2022-01-19

## 2022-01-19 VITALS
DIASTOLIC BLOOD PRESSURE: 78 MMHG | SYSTOLIC BLOOD PRESSURE: 138 MMHG | OXYGEN SATURATION: 95 % | HEART RATE: 88 BPM | WEIGHT: 200 LBS | BODY MASS INDEX: 33.28 KG/M2

## 2022-01-19 PROCEDURE — 93000 ELECTROCARDIOGRAM COMPLETE: CPT

## 2022-01-19 PROCEDURE — 99213 OFFICE O/P EST LOW 20 MIN: CPT

## 2022-01-19 NOTE — HISTORY OF PRESENT ILLNESS
[FreeTextEntry1] : William is 79 years old with a history of hypertension  and mild hyperlipidemia.  He is otherwise healthy and has not had any significant hospitalizations.  He is a non-smoker.  There is no significant family history of coronary artery disease\par \par To review, he presented to his internist Dr. Ricky Kwok with severe left arm pain and new EKG changes in the lateral leads.  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  He denies chest pain\par \par He lost his wife over 2 years ago which is created great sadness for him depression and anxiety.\par \par He was admitted to [facility] with a diagnosis of unstable angina.  ECG showed some nonspecific T wave changes in the lateral leads enzymes were normal\par \par He underwent cardiac catheterization approximately 1.5 years ago.  LV function was normal.  He had severe disease of the circumflex and right coronary artery and the LAD had luminal disease.  He underwent successful stenting of the circumflex and right coronary with drug-eluting stents.  He was discharged the following day.  He did have chest pressure during the inflations but no significant left arm pain\par \par Since discharge he has felt well without chest pain chest pressure left arm pain or shortness of breath\par \par He continues to feel well without chest pain chest pressure shortness of breath.  He seems to be somewhat confused about his medications on last visit his blood pressure was over 150\par \par He denies chest pain chest pressure shortness of breath.  He denies dizziness or syncope\par Blood pressure today is only 100/70\par EKG is normal sinus rhythm with nonspecific ST-T wave changes and is unchanged\par \par He is tolerating his present regimen of medications\par \par He continues to feel well without chest pain chest pressure back pain or shortness of breath.  He is much less depressed though complains of sleeping a good deal during the day though does not bother him.\par \par He does not exercise that much but what ever activity he does he has no cardiac symptoms\par He continues to do well but is now in need of dental work.  He is still on Plavix\par \par O last visit he denied shortness of breath chest pain chest pressure palpitations.  He does feel loss of balance and is certain dizziness.  On last visit his blood pressure was on the low side.  He is not clear exactly what he is taking\par \par He came today because yesterday he developed pain across his chest which was worse when he took a breath. There was no associated nausea vomiting diaphoresis and the symptoms are different than what he had when he had two stents placed.\par \par EKG today is unchanged with normal sinus rhythm first-degree AV block nonspecific ST-T wave changes

## 2022-01-19 NOTE — ASSESSMENT
[FreeTextEntry1] : William Avendano has risk factors of coronary disease of hypertension hyperlipidemia and diabetes type 2.  He is status post an episode of unstable angina with left arm pain status post PTCI of the left circumflex and right coronary with drug-eluting stents.  He is clinically stable without angina in good spirits good exercise tolerance without shortness of breath chest pain or palpitations.  He is tolerating his present regimen of medications but does have a sinus bradycardia though his heart rate with mild exercise does increased to over 60 beats a minute.  Today he remains stable without any symptoms stable blood pressure and heart rate and EKG.  His blood pressure is somewhat elevated today.  He has gained weight. Yesterday he had an episode of chest discomfort across his chest worse when he took a breath. Today he has no pain his EKG is unremarkable and his physical exam is normal and now with a blood pressure of 138/78\par \par 1.  Chronic ischemic heart disease status post PTCI of the circumflex and right coronary no recurrence of arm pain or chest pain. He had an episode of chest pain yesterday which was probably noncardiac perhaps GI with some pleuritic component. Presently he is free of any pain\par \par 2.  Hyperlipidemia on statin\par \par 3.  Depression and anxiety markedly improved since his original admission in good spirits\par \par 4.  Hypertension today blood pressure well controlled at 138/78 and I the low no high

## 2022-01-19 NOTE — DISCUSSION/SUMMARY
[FreeTextEntry1] : Patient will continue on his present regimen of medications. I reassured him that I did not feel this chest pain that he had yesterday was cardiac in origin. He will follow-up with Dr. Kwok within the next day or two.\par \par Routine follow with me again in 3 months

## 2022-04-05 ENCOUNTER — APPOINTMENT (OUTPATIENT)
Dept: CARDIOLOGY | Facility: CLINIC | Age: 82
End: 2022-04-05

## 2022-04-26 ENCOUNTER — APPOINTMENT (OUTPATIENT)
Dept: CARDIOLOGY | Facility: CLINIC | Age: 82
End: 2022-04-26

## 2022-05-03 ENCOUNTER — NON-APPOINTMENT (OUTPATIENT)
Age: 82
End: 2022-05-03

## 2022-05-03 ENCOUNTER — APPOINTMENT (OUTPATIENT)
Dept: CARDIOLOGY | Facility: CLINIC | Age: 82
End: 2022-05-03
Payer: MEDICARE

## 2022-05-03 VITALS
DIASTOLIC BLOOD PRESSURE: 80 MMHG | WEIGHT: 199 LBS | HEIGHT: 65 IN | OXYGEN SATURATION: 95 % | SYSTOLIC BLOOD PRESSURE: 168 MMHG | BODY MASS INDEX: 33.15 KG/M2 | HEART RATE: 84 BPM

## 2022-05-03 PROCEDURE — 93000 ELECTROCARDIOGRAM COMPLETE: CPT

## 2022-05-03 PROCEDURE — 99214 OFFICE O/P EST MOD 30 MIN: CPT

## 2022-05-03 NOTE — REASON FOR VISIT
[FreeTextEntry1] : William Juan Alberto 81 years old here for follow-up of his cardiac status.  His main problem continues to be some unsteadiness with walking particularly going up stairs

## 2022-05-03 NOTE — DISCUSSION/SUMMARY
[FreeTextEntry1] : Overall the patient's cardiac status remained stable.  He will follow-up with his internist Dr. Randall for full blood work etc.  Perhaps physical therapy would be helpful in his improving his walking and unsteadiness.\par \par Presently there are no active cardiac issues

## 2022-05-03 NOTE — ASSESSMENT
[FreeTextEntry1] : William Avendano has risk factors of coronary disease of hypertension hyperlipidemia and diabetes type 2.  He is status post an episode of unstable angina with left arm pain status post PTCI of the left circumflex and right coronary with drug-eluting stents.  He is clinically stable without angina in good spirits good exercise tolerance without shortness of breath chest pain or palpitations.  Since the last visit in January 2022 his cardiac status is remained stable without chest pain or significant shortness of breath but is unsteady gait and walking difficulties persist\par \par 1.  Chronic ischemic heart disease status post PTCI of the circumflex and right coronary no recurrence of arm pain or chest pain. He had an episode of chest pain yesterday which was probably noncardiac perhaps GI with some pleuritic component. Presently he is free of any pain\par \par 2.  Hyperlipidemia on statin\par \par 3.  Depression and anxiety markedly improved since his original admission in good spirits\par \par 4.  Hypertension today blood pressure relatively well controlled \par \par 5.  Unsteady gait unlikely cardiac related

## 2022-05-10 NOTE — H&P ADULT - NSHPROSALLOTHERNEGRD_GEN_ALL_CORE
I reviewed the H&P, I examined the patient, and there are no changes in the patient's condition.    
All other review of systems negative, except as noted in HPI

## 2022-08-02 ENCOUNTER — NON-APPOINTMENT (OUTPATIENT)
Age: 82
End: 2022-08-02

## 2022-08-02 ENCOUNTER — APPOINTMENT (OUTPATIENT)
Dept: CARDIOLOGY | Facility: CLINIC | Age: 82
End: 2022-08-02

## 2022-08-02 VITALS
BODY MASS INDEX: 32.15 KG/M2 | OXYGEN SATURATION: 95 % | WEIGHT: 193 LBS | HEART RATE: 93 BPM | SYSTOLIC BLOOD PRESSURE: 146 MMHG | HEIGHT: 65 IN | DIASTOLIC BLOOD PRESSURE: 84 MMHG

## 2022-08-02 PROCEDURE — 99214 OFFICE O/P EST MOD 30 MIN: CPT | Mod: 25

## 2022-08-02 PROCEDURE — 93000 ELECTROCARDIOGRAM COMPLETE: CPT

## 2022-08-02 RX ORDER — CITALOPRAM HYDROBROMIDE 20 MG/1
20 TABLET, FILM COATED ORAL
Qty: 90 | Refills: 0 | Status: ACTIVE | COMMUNITY
Start: 2022-07-14

## 2022-08-02 RX ORDER — AMLODIPINE BESYLATE 10 MG/1
10 TABLET ORAL
Qty: 90 | Refills: 0 | Status: ACTIVE | COMMUNITY
Start: 2022-07-14

## 2022-08-02 RX ORDER — AMLODIPINE BESYLATE 5 MG/1
5 TABLET ORAL
Qty: 90 | Refills: 0 | Status: DISCONTINUED | COMMUNITY
Start: 2020-11-07 | End: 2022-08-02

## 2022-08-02 RX ORDER — METFORMIN ER 500 MG 500 MG/1
500 TABLET ORAL
Qty: 90 | Refills: 0 | Status: ACTIVE | COMMUNITY
Start: 2022-07-14

## 2022-08-02 RX ORDER — ALBUTEROL SULFATE 90 UG/1
108 (90 BASE) INHALANT RESPIRATORY (INHALATION)
Qty: 7 | Refills: 0 | Status: ACTIVE | COMMUNITY
Start: 2022-07-14

## 2022-08-02 NOTE — H&P ADULT - ASSESSMENT
Encounter Date: 8/2/2022       History     Chief Complaint   Patient presents with    Fall     Pt fell yesterday injuring L. Wrist and back of neck. Pt has had previous surgery to neck in 2013     51-year-old male with complaint of left wrist and left hip pain since fall yesterday.  Patient reports that he tripped and fell landed on left side.  Constant aching pain worsening movement.  Patient denies neck or back pain at present.        Review of patient's allergies indicates:  No Known Allergies  History reviewed. No pertinent past medical history.  Past Surgical History:   Procedure Laterality Date    CERVICAL FUSION      left arm       Family History   Problem Relation Age of Onset    Arthritis Mother     Arthritis Sister     Arthritis Maternal Grandmother      Social History     Tobacco Use    Smoking status: Current Every Day Smoker     Packs/day: 0.50    Smokeless tobacco: Never Used   Substance Use Topics    Alcohol use: Yes     Comment: occasional    Drug use: No     Review of Systems   Constitutional: Negative for fever.   HENT: Negative for sore throat.    Respiratory: Negative for shortness of breath.    Cardiovascular: Negative for chest pain.   Gastrointestinal: Negative for nausea.   Genitourinary: Negative for dysuria.   Musculoskeletal: Negative for back pain.        Left wrist pain, left hip pain    Skin: Negative for rash.   Neurological: Negative for weakness.   Hematological: Does not bruise/bleed easily.       Physical Exam     Initial Vitals [08/02/22 1345]   BP Pulse Resp Temp SpO2   (!) 144/75 72 18 98.9 °F (37.2 °C) 98 %      MAP       --         Physical Exam    Nursing note and vitals reviewed.  Constitutional: He appears well-developed and well-nourished.   HENT:   Head: Normocephalic and atraumatic.   Eyes: Conjunctivae are normal. Pupils are equal, round, and reactive to light.   Neck: Neck supple.   Normal range of motion.  Cardiovascular: Normal rate, regular rhythm, normal  heart sounds and intact distal pulses.   Pulmonary/Chest: Breath sounds normal.   Abdominal: Abdomen is soft. There is no rebound and no guarding.   Musculoskeletal:         General: Normal range of motion.      Cervical back: Normal range of motion and neck supple.      Comments: Left dorsal wrist tenderness, left lateral hip tenderness, no spine tenderness, full ROM X 4     Neurological: He is alert.   Skin: Skin is warm and dry.   Psychiatric: He has a normal mood and affect. His behavior is normal. Thought content normal.         ED Course   Procedures  Labs Reviewed - No data to display       Imaging Results          X-Ray Wrist Complete Left (Final result)  Result time 08/02/22 14:21:05    Final result by Singh Vasquez MD (08/02/22 14:21:05)                 Impression:      No acute fracture or dislocation.      Electronically signed by: Singh Vasquez MD  Date:    08/02/2022  Time:    14:21             Narrative:    EXAMINATION:  XR WRIST COMPLETE 3 VIEWS LEFT    CLINICAL HISTORY:  XR WRIST COMPLETE 3 VIEWS LEFTPain in unspecified wrist    COMPARISON:  None    FINDINGS:  Four views of the left wrist were obtained.    No evidence of acute fracture or dislocation.  Bony mineralization is normal.  Soft tissues are unremarkable.   Mild degenerative changes.  No evidence of scapholunate disassociation.  Minimal widening at the distal articulation of the radius and ulna which may reflect remote injury.                               X-Ray Hip 2 or 3 views Left (with Pelvis when performed) (Final result)  Result time 08/02/22 14:19:50    Final result by Singh Vasquez MD (08/02/22 14:19:50)                 Impression:      No acute fracture or dislocation.      Electronically signed by: Singh Vasquez MD  Date:    08/02/2022  Time:    14:19             Narrative:    EXAMINATION:  XR HIP WITH PELVIS WHEN PERFORMED, 2 OR 3 VIEWS LEFT    CLINICAL HISTORY:  XR HIP WITH PELVIS WHEN PERFORMED, 2 OR 3 VIEWS  LEFT    COMPARISON:  None    FINDINGS:  Three views of the left hip were obtained.    No evidence of acute fracture or dislocation.  Bony mineralization is normal.  Soft tissues are unremarkable.   Mild degenerative changes of the acetabulum bilaterally.  Suspect mild cystic degenerative change right femoral head.                                 Medications - No data to display                       Clinical Impression:   Final diagnoses:  [M25.539] Wrist pain  [S63.502A] Wrist sprain, left, initial encounter (Primary)  [S70.02XA] Contusion of left hip, initial encounter          ED Disposition Condition    Discharge Stable        ED Prescriptions     Medication Sig Dispense Start Date End Date Auth. Provider    naproxen (NAPROSYN) 375 MG tablet Take 1 tablet (375 mg total) by mouth 2 (two) times daily as needed (pain). 20 tablet 8/2/2022  Hitesh Mauricio NP        Follow-up Information     Follow up With Specialties Details Why Contact Info    [facility]  Schedule an appointment as soon as possible for a visit in 2 days  [phone]           Hitesh Mauricio NP  08/02/22 2855     79y male with pmhx htn, dm, hld depression sent in by his pmd for EKG changes.

## 2022-08-02 NOTE — ASSESSMENT
[FreeTextEntry1] : William Avendano has risk factors of coronary disease of hypertension hyperlipidemia and diabetes type 2.  He is status post an episode of unstable angina with left arm pain status post PTCI of the left circumflex and right coronary with drug-eluting stents.  He is clinically stable without angina in good spirits good exercise tolerance without shortness of breath chest pain or palpitations.   he continues to be depressed and lonely.  He is forgetful and often does not take his medications.\par \par 1.  Chronic ischemic heart disease status post PTCI of the circumflex and right coronary no recurrence of arm pain or chest pain. He had an episode of chest pain yesterday which was probably noncardiac perhaps GI with some pleuritic component. Presently he is free of any pain\par \par 2.  Hyperlipidemia on statin\par \par 3.  Depression and anxiety markedly improved since his original admission in good spirits\par \par 4.  Hypertension   Today blood pressure again not well controlled.  He did not take his medications today.\par 5.  Unsteady gait unlikely cardiac related

## 2022-08-02 NOTE — HISTORY OF PRESENT ILLNESS
[FreeTextEntry1] : William is 79 years old with a history of hypertension  and mild hyperlipidemia.  He is otherwise healthy and has not had any significant hospitalizations.  He is a non-smoker.  There is no significant family history of coronary artery disease\par \par To review, he presented to his internist Dr. Ricky Kwok with severe left arm pain and new EKG changes in the lateral leads.  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  He denies chest pain\par \par He lost his wife over 3 years ago which is created great sadness for him depression and anxiety.\par \par He was admitted to [facility] with a diagnosis of unstable angina.  ECG showed some nonspecific T wave changes in the lateral leads enzymes were normal\par \par He underwent cardiac catheterization approximately November 6, 2020.  LV function was normal.  He had severe disease of the circumflex and right coronary artery and the LAD had luminal disease.  He underwent successful stenting of the circumflex and right coronary with drug-eluting stents.  He was discharged the following day.  He did have chest pressure during the inflations but no significant left arm pain\par \par Since discharge he has felt well without chest pain chest pressure left arm pain or shortness of breath\par \par He continues to feel well without chest pain chest pressure shortness of breath.  He seems to be somewhat confused about his medications on last visit his blood pressure was over 150\par \par He denies chest pain chest pressure shortness of breath.  He denies dizziness or syncope\par \par He is tolerating his present regimen of medications\par \par He continues to feel well without chest pain chest pressure back pain or shortness of breath.  He is much less depressed though complains of sleeping a good deal during the day though does not bother him.\par \par He does have some unsteadiness on his feet particularly going up stairs often has to lean against the wall to steady himself.  He denies palpitations chest pain or significant shortness of breath.  He remains on stable medication\par \par EKG today on May 6  2022 normal sinus rhythm minor nonspecific ST-T wave changes unchanged from prior EKG\par \par He does not exercise that much but what ever activity he does he has no cardiac symptoms\par He continues to do well but is now in need of dental work.  He is still on Plavix\par \par Continues to be depressed.  He often forgets to take his medicines as he did this morning.  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

## 2022-08-02 NOTE — DISCUSSION/SUMMARY
[FreeTextEntry1] : \par 1.  I urged him to be more careful to take his medications on a regular basis and make certain that he is taking amlodipine 10 not 5\par \par  2.  I urged him to be more careful with his caloric intake.  He does claim he is careful with the salt in his diet\par \par  from a cardiac point of view he is stable but needs better blood pressure control.  Routine follow-up again in 3 months [EKG obtained to assist in diagnosis and management of assessed problem(s)] : EKG obtained to assist in diagnosis and management of assessed problem(s)

## 2022-11-02 ENCOUNTER — APPOINTMENT (OUTPATIENT)
Dept: CARDIOLOGY | Facility: CLINIC | Age: 82
End: 2022-11-02

## 2022-11-02 VITALS
BODY MASS INDEX: 31.95 KG/M2 | HEART RATE: 92 BPM | SYSTOLIC BLOOD PRESSURE: 160 MMHG | WEIGHT: 192 LBS | DIASTOLIC BLOOD PRESSURE: 86 MMHG | OXYGEN SATURATION: 95 %

## 2022-11-02 PROCEDURE — 99214 OFFICE O/P EST MOD 30 MIN: CPT

## 2022-11-02 NOTE — DISCUSSION/SUMMARY
[FreeTextEntry1] :  patient's cardiac status appears to be stable.  He does need to lose weight but this is unrealistic in my opinion.  I question about the importance of taking his medications and maintaining a low-salt diet\par \par  routine follow-up again in 4 months

## 2022-11-02 NOTE — HISTORY OF PRESENT ILLNESS
[FreeTextEntry1] : William is 81 years old with a history of hypertension  and mild hyperlipidemia.  He is otherwise healthy and has not had any significant hospitalizations.  He is a non-smoker.  There is no significant family history of coronary artery disease\par \par To review, he presented to his internist Dr. Ricky Kwok with severe left arm pain and new EKG changes in the lateral leads.  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  He denies chest pain\par \par He lost his wife over 3 years ago which is created great sadness for him depression and anxiety.\par \par He was admitted to [facility] with a diagnosis of unstable angina.  ECG showed some nonspecific T wave changes in the lateral leads enzymes were normal\par \par He underwent cardiac catheterization approximately November 6, 2020.  LV function was normal.  He had severe disease of the circumflex and right coronary artery and the LAD had luminal disease.  He underwent successful stenting of the circumflex and right coronary with drug-eluting stents.  He was discharged the following day.  He did have chest pressure during the inflations but no significant left arm pain\par \par Since discharge he has felt well without chest pain chest pressure left arm pain or shortness of breath\par \par He continues to feel well without chest pain chest pressure shortness of breath.  He seems to be somewhat confused about his medications on last visit his blood pressure was over 150\par \par He denies chest pain chest pressure shortness of breath.  He denies dizziness or syncope\par \par He is tolerating his present regimen of medications\par \par He continues to feel well without chest pain chest pressure back pain or shortness of breath.  He is much less depressed though complains of sleeping a good deal during the day though does not bother him.\par \par He does have some unsteadiness on his feet particularly going up stairs often has to lean against the wall to steady himself.  He denies palpitations chest pain or significant shortness of breath.  He remains on stable medication\par \par EKG today on May 6  2022 normal sinus rhythm minor nonspecific ST-T wave changes unchanged from prior EKG\par \par He does not exercise that much but what ever activity he does he has no cardiac symptoms\par He continues to do well but is now in need of dental work.  He is still on Plavix\par \par Continues to be depressed.  He often forgets to take his medicines as he did this morning.  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  He has no headache dizziness or syncope.  He remains on stable medication\par \par \par EKG today is unchanged with normal sinus rhythm first-degree AV block nonspecific ST-T wave changes

## 2022-11-02 NOTE — ASSESSMENT
[FreeTextEntry1] : William Avendano has risk factors of coronary disease of hypertension hyperlipidemia and diabetes type 2.  He is status post an episode of unstable angina with left arm pain status post PTCI of the left circumflex and right coronary with drug-eluting stents.  He is clinically stable without angina in good spirits good exercise tolerance without shortness of breath chest pain or palpitations.   he continues to be depressed and lonely.  He is forgetful and often does not take his medications. there has been no significant change in his cardiac status without overt angina or CHF.  He does have an elevated BMI\par \par 1.  Chronic ischemic heart disease status post PTCI of the circumflex and right coronary no recurrence of arm pain or chest pain. He had an episode of chest pain yesterday which was probably noncardiac perhaps GI with some pleuritic component. Presently he is free of any pain\par \par 2.  Hyperlipidemia on statin\par \par 3.  Depression and anxiety markedly improved since his original admission in good spirits\par \par 4.  Hypertension    blood pressure better controlled today\par 5.  Unsteady gait unlikely cardiac related\par \par

## 2022-11-02 NOTE — REASON FOR VISIT
[FreeTextEntry1] : William Connorlor 81 years old here for follow-up of his cardiac  status.  He was last seen on August 2, 2022

## 2023-03-07 ENCOUNTER — APPOINTMENT (OUTPATIENT)
Dept: CARDIOLOGY | Facility: CLINIC | Age: 83
End: 2023-03-07
Payer: MEDICARE

## 2023-03-07 ENCOUNTER — NON-APPOINTMENT (OUTPATIENT)
Age: 83
End: 2023-03-07

## 2023-03-07 VITALS
OXYGEN SATURATION: 95 % | DIASTOLIC BLOOD PRESSURE: 88 MMHG | BODY MASS INDEX: 31.12 KG/M2 | WEIGHT: 187 LBS | SYSTOLIC BLOOD PRESSURE: 120 MMHG | HEART RATE: 79 BPM

## 2023-03-07 PROCEDURE — 93000 ELECTROCARDIOGRAM COMPLETE: CPT

## 2023-03-07 PROCEDURE — 99214 OFFICE O/P EST MOD 30 MIN: CPT | Mod: 25

## 2023-03-07 NOTE — DISCUSSION/SUMMARY
[FreeTextEntry1] : 1.  I reassured him that his cardiac status is stable and there are no active cardiac issues\par \par 2.  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  Routine follow-up again in 3 months\par \par He will follow-up with his internist Dr. Kwok [EKG obtained to assist in diagnosis and management of assessed problem(s)] : EKG obtained to assist in diagnosis and management of assessed problem(s)

## 2023-03-07 NOTE — ASSESSMENT
[FreeTextEntry1] : William Avendano has risk factors of coronary disease of hypertension hyperlipidemia and diabetes type 2.  He is status post an episode of unstable angina with left arm pain status post PTCI of the left circumflex and right coronary with drug-eluting stents.  He is clinically stable without angina in good spirits good exercise tolerance without shortness of breath chest pain or palpitations.   he continues to be depressed and lonely.  He is forgetful and often does not take his medications. there has been no significant change in his cardiac status without overt angina or CHF.  He does have an elevated BMI\par \par 1.  Chronic ischemic heart disease status post PTCI of the circumflex and right coronary no recurrence of arm pain or chest pain.  No symptoms of angina or signs of CHF\par 2.  Hyperlipidemia on statin\par \par 3.  Depression and anxiety markedly improved since his original admission in good spirits\par \par 4.  Hypertension    blood pressure controlled today\par 5.  Unsteady gait unlikely cardiac related but probably related to his overall depression\par \par

## 2023-03-07 NOTE — HISTORY OF PRESENT ILLNESS
[FreeTextEntry1] : William is 81 years old with a history of hypertension  and mild hyperlipidemia.  He is otherwise healthy and has not had any significant hospitalizations.  He is a non-smoker.  There is no significant family history of coronary artery disease\par \par To review, he presented to his internist Dr. Ricky Kwok with severe left arm pain and new EKG changes in the lateral leads.  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  He denies chest pain\par \par He lost his wife over 3 years ago which is created great sadness for him depression and anxiety.\par \par He was admitted to [facility] with a diagnosis of unstable angina.  ECG showed some nonspecific T wave changes in the lateral leads enzymes were normal\par \par He underwent cardiac catheterization approximately November 6, 2020.  LV function was normal.  He had severe disease of the circumflex and right coronary artery and the LAD had luminal disease.  He underwent successful stenting of the circumflex and right coronary with drug-eluting stents.  He was discharged the following day.  He did have chest pressure during the inflations but no significant left arm pain\par \par Since discharge he has felt well without chest pain chest pressure left arm pain or shortness of breath\par \par He continues to feel well without chest pain chest pressure shortness of breath.  He seems to be somewhat confused about his medications on last visit his blood pressure was over 150\par \par He denies chest pain chest pressure shortness of breath.  He denies dizziness or syncope\par \par He is tolerating his present regimen of medications\par \par He continues to feel well without chest pain chest pressure back pain or shortness of breath.  He is much less depressed though complains of sleeping a good deal during the day though does not bother him.\par \par He does have some unsteadiness on his feet particularly going up stairs often has to lean against the wall to steady himself.  He denies palpitations chest pain or significant shortness of breath.  He remains on stable medication\par \par EKG  on May 6  2022 normal sinus rhythm minor nonspecific ST-T wave changes unchanged from prior EKG\par \par He does not exercise that much but what ever activity he does he has no cardiac symptoms\par He continues to do well but is now in need of dental work.  He is still on Plavix\par \par Continues to be depressed.  He often forgets to take his medicines as he did this morning.  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  He has no headache dizziness or syncope.  He remains on stable medication\par \par Visit March 7, 2023: Since the last visit there is been no change in his status.  He denies chest pain or chest pressure.  He is chronically depressed even though it is 5 years since the passing of his wife.  There are days where he feels very energetic and other days in which he feels like he does not want to do anything.  He has no edema orthopnea PND\par \par EKG is normal sinus rhythm nonspecific ST-T wave changes baseline artifact no change March 7, 2023\par \par \par EKG today is unchanged with normal sinus rhythm first-degree AV block nonspecific ST-T wave changes

## 2023-03-07 NOTE — REASON FOR VISIT
[FreeTextEntry1] : William Connorlor 82 years old here for follow-up of his cardiac status.  He was seen on March 7, 2023

## 2023-03-16 ENCOUNTER — EMERGENCY (EMERGENCY)
Facility: HOSPITAL | Age: 83
LOS: 1 days | Discharge: HOME | End: 2023-03-16
Attending: EMERGENCY MEDICINE | Admitting: EMERGENCY MEDICINE
Payer: MEDICARE

## 2023-03-16 VITALS
DIASTOLIC BLOOD PRESSURE: 94 MMHG | TEMPERATURE: 98 F | OXYGEN SATURATION: 100 % | RESPIRATION RATE: 20 BRPM | SYSTOLIC BLOOD PRESSURE: 137 MMHG | HEART RATE: 80 BPM

## 2023-03-16 VITALS
OXYGEN SATURATION: 95 % | DIASTOLIC BLOOD PRESSURE: 71 MMHG | SYSTOLIC BLOOD PRESSURE: 115 MMHG | HEART RATE: 100 BPM | RESPIRATION RATE: 18 BRPM | TEMPERATURE: 98 F

## 2023-03-16 LAB
A1C WITH ESTIMATED AVERAGE GLUCOSE RESULT: 9.9 % — HIGH (ref 4–5.6)
ALBUMIN SERPL ELPH-MCNC: 3.8 G/DL — SIGNIFICANT CHANGE UP (ref 3.3–5)
ALP SERPL-CCNC: 134 U/L — HIGH (ref 40–120)
ALT FLD-CCNC: 20 U/L — SIGNIFICANT CHANGE UP (ref 4–41)
ANION GAP SERPL CALC-SCNC: 15 MMOL/L — HIGH (ref 7–14)
AST SERPL-CCNC: 20 U/L — SIGNIFICANT CHANGE UP (ref 4–40)
B-OH-BUTYR SERPL-SCNC: 0.2 MMOL/L — SIGNIFICANT CHANGE UP (ref 0–0.4)
BASE EXCESS BLDV CALC-SCNC: 2.9 MMOL/L — SIGNIFICANT CHANGE UP (ref -2–3)
BASOPHILS # BLD AUTO: 0.04 K/UL — SIGNIFICANT CHANGE UP (ref 0–0.2)
BASOPHILS NFR BLD AUTO: 0.4 % — SIGNIFICANT CHANGE UP (ref 0–2)
BILIRUB SERPL-MCNC: 0.8 MG/DL — SIGNIFICANT CHANGE UP (ref 0.2–1.2)
BLOOD GAS VENOUS COMPREHENSIVE RESULT: SIGNIFICANT CHANGE UP
BUN SERPL-MCNC: 25 MG/DL — HIGH (ref 7–23)
CALCIUM SERPL-MCNC: 8.8 MG/DL — SIGNIFICANT CHANGE UP (ref 8.4–10.5)
CHLORIDE BLDV-SCNC: 99 MMOL/L — SIGNIFICANT CHANGE UP (ref 96–108)
CHLORIDE SERPL-SCNC: 100 MMOL/L — SIGNIFICANT CHANGE UP (ref 98–107)
CO2 BLDV-SCNC: 29.2 MMOL/L — HIGH (ref 22–26)
CO2 SERPL-SCNC: 25 MMOL/L — SIGNIFICANT CHANGE UP (ref 22–31)
CREAT SERPL-MCNC: 1.33 MG/DL — HIGH (ref 0.5–1.3)
EGFR: 54 ML/MIN/1.73M2 — LOW
EOSINOPHIL # BLD AUTO: 0.05 K/UL — SIGNIFICANT CHANGE UP (ref 0–0.5)
EOSINOPHIL NFR BLD AUTO: 0.5 % — SIGNIFICANT CHANGE UP (ref 0–6)
ESTIMATED AVERAGE GLUCOSE: 237 — SIGNIFICANT CHANGE UP
FLUAV AG NPH QL: SIGNIFICANT CHANGE UP
FLUBV AG NPH QL: SIGNIFICANT CHANGE UP
GAS PNL BLDV: 133 MMOL/L — LOW (ref 136–145)
GLUCOSE BLDV-MCNC: 424 MG/DL — HIGH (ref 70–99)
GLUCOSE SERPL-MCNC: 423 MG/DL — HIGH (ref 70–99)
HCO3 BLDV-SCNC: 28 MMOL/L — SIGNIFICANT CHANGE UP (ref 22–29)
HCT VFR BLD CALC: 44.5 % — SIGNIFICANT CHANGE UP (ref 39–50)
HCT VFR BLDA CALC: 47 % — SIGNIFICANT CHANGE UP (ref 39–51)
HGB BLD CALC-MCNC: 15.8 G/DL — SIGNIFICANT CHANGE UP (ref 12.6–17.4)
HGB BLD-MCNC: 15.7 G/DL — SIGNIFICANT CHANGE UP (ref 13–17)
IANC: 7.91 K/UL — HIGH (ref 1.8–7.4)
IMM GRANULOCYTES NFR BLD AUTO: 0.4 % — SIGNIFICANT CHANGE UP (ref 0–0.9)
LACTATE BLDV-MCNC: 2.3 MMOL/L — HIGH (ref 0.5–2)
LYMPHOCYTES # BLD AUTO: 1.49 K/UL — SIGNIFICANT CHANGE UP (ref 1–3.3)
LYMPHOCYTES # BLD AUTO: 14.7 % — SIGNIFICANT CHANGE UP (ref 13–44)
MAGNESIUM SERPL-MCNC: 2.1 MG/DL — SIGNIFICANT CHANGE UP (ref 1.6–2.6)
MCHC RBC-ENTMCNC: 29.8 PG — SIGNIFICANT CHANGE UP (ref 27–34)
MCHC RBC-ENTMCNC: 35.3 GM/DL — SIGNIFICANT CHANGE UP (ref 32–36)
MCV RBC AUTO: 84.6 FL — SIGNIFICANT CHANGE UP (ref 80–100)
MONOCYTES # BLD AUTO: 0.58 K/UL — SIGNIFICANT CHANGE UP (ref 0–0.9)
MONOCYTES NFR BLD AUTO: 5.7 % — SIGNIFICANT CHANGE UP (ref 2–14)
NEUTROPHILS # BLD AUTO: 7.91 K/UL — HIGH (ref 1.8–7.4)
NEUTROPHILS NFR BLD AUTO: 78.3 % — HIGH (ref 43–77)
NRBC # BLD: 0 /100 WBCS — SIGNIFICANT CHANGE UP (ref 0–0)
NRBC # FLD: 0 K/UL — SIGNIFICANT CHANGE UP (ref 0–0)
PCO2 BLDV: 43 MMHG — SIGNIFICANT CHANGE UP (ref 42–55)
PH BLDV: 7.42 — SIGNIFICANT CHANGE UP (ref 7.32–7.43)
PLATELET # BLD AUTO: 254 K/UL — SIGNIFICANT CHANGE UP (ref 150–400)
PO2 BLDV: 55 MMHG — HIGH (ref 25–45)
POTASSIUM BLDV-SCNC: 3.1 MMOL/L — LOW (ref 3.5–5.1)
POTASSIUM SERPL-MCNC: 3.5 MMOL/L — SIGNIFICANT CHANGE UP (ref 3.5–5.3)
POTASSIUM SERPL-SCNC: 3.5 MMOL/L — SIGNIFICANT CHANGE UP (ref 3.5–5.3)
PROT SERPL-MCNC: 6.2 G/DL — SIGNIFICANT CHANGE UP (ref 6–8.3)
RBC # BLD: 5.26 M/UL — SIGNIFICANT CHANGE UP (ref 4.2–5.8)
RBC # FLD: 12.3 % — SIGNIFICANT CHANGE UP (ref 10.3–14.5)
RSV RNA NPH QL NAA+NON-PROBE: SIGNIFICANT CHANGE UP
SAO2 % BLDV: 90.7 % — HIGH (ref 67–88)
SARS-COV-2 RNA SPEC QL NAA+PROBE: SIGNIFICANT CHANGE UP
SODIUM SERPL-SCNC: 140 MMOL/L — SIGNIFICANT CHANGE UP (ref 135–145)
TROPONIN T, HIGH SENSITIVITY RESULT: 35 NG/L — SIGNIFICANT CHANGE UP
WBC # BLD: 10.11 K/UL — SIGNIFICANT CHANGE UP (ref 3.8–10.5)
WBC # FLD AUTO: 10.11 K/UL — SIGNIFICANT CHANGE UP (ref 3.8–10.5)

## 2023-03-16 PROCEDURE — 70450 CT HEAD/BRAIN W/O DYE: CPT | Mod: 26,MA

## 2023-03-16 PROCEDURE — 99285 EMERGENCY DEPT VISIT HI MDM: CPT

## 2023-03-16 RX ORDER — INSULIN LISPRO 100/ML
4 VIAL (ML) SUBCUTANEOUS ONCE
Refills: 0 | Status: COMPLETED | OUTPATIENT
Start: 2023-03-16 | End: 2023-03-16

## 2023-03-16 RX ORDER — SODIUM CHLORIDE 9 MG/ML
1000 INJECTION INTRAMUSCULAR; INTRAVENOUS; SUBCUTANEOUS ONCE
Refills: 0 | Status: COMPLETED | OUTPATIENT
Start: 2023-03-16 | End: 2023-03-16

## 2023-03-16 RX ADMIN — Medication 4 UNIT(S): at 18:12

## 2023-03-16 RX ADMIN — SODIUM CHLORIDE 1000 MILLILITER(S): 9 INJECTION INTRAMUSCULAR; INTRAVENOUS; SUBCUTANEOUS at 15:38

## 2023-03-16 NOTE — ED PROVIDER NOTE - ATTENDING CONTRIBUTION TO CARE
Dr. Sousa: 81 yo male with HTN, HLD, DM, in ED with 3 days of dizziness, sometimes nausea, but no CP/SOB, V/D or other complaints.  On exam pt overall well appearing, in NAD, heart RRR, lungs CTAB, abd NTND, extremities without swelling, strength 5/5 in all extremities and skin without rash.

## 2023-03-16 NOTE — ED PROVIDER NOTE - CLINICAL SUMMARY MEDICAL DECISION MAKING FREE TEXT BOX
82-year-old male with history of hypertension, hyperlipidemia, non-insulin-dependent diabetes, presenting with dizziness x3 days.  Well appearing here, good vitals overall slightly tachycardic, neurologically intact, no overt signs of infection or CVA. Found to be hyperglycemic to ~400 in triage, will need DKA r/o in addition to cardiac enzymes and CTH, possible admission for FTT given difficulty taking care of his medical issues at home (lives alone), may benefit from inpatient optimization for endo/neuro/PT/?psych--spoke about this potential plan with PMD Dr. William Hyde who has privileges here and is agreeable if deemed beneficial for patient, will touch base again after work up

## 2023-03-16 NOTE — CHART NOTE - NSCHARTNOTEFT_GEN_A_CORE
[facility]  William Hyde  Office: [phone]  Cell: [phone]  Can also be reached on [employer] Teams     Have been seeing outpatient for the past 6 months. He is an 82 year old male with pmh DM II very poorly controlled, CKD baseline cr 1.2-1.4, worsening BPH recently started on finasteride, CAD on asa and statin, prolonged grief disorder. Since the death of his wife, he has had increasing difficulty taking care of himself. 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 Depends on meals on wheels for his diet. He has a HCP Niraj [id number], and has molst sign dnr dni. Niraj is the brother of his late wife. In the past 6 months he has had multiple episodes of falls. Had a vasovagal near-syncope in the office once after getting emotional over late wife. ECG no new changes at that time sinus with PVCs and lateral t-wave changes. Prior to that he had 1degree av block in which metoprolol was discontinued. Regarding diabetes, have been titrating up his medications, recently adding SGLTi. Was on metformin and januvia prior. Due to patient's age and social conditions, did not think patient could handle insulin use. He did visit a dietician and was able to get his a1c <10. 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 Patient felt a little dizzy which he states is normal and goes away. He was sent to the hospital by ambulance. Patient called my office to inform us of his ER visit. Today endorsing some noncompliance with diet, had some sodas. Otherwise feels at usual state of health with any new complaints.    home meds: atorvastatin 40, citalopram 20, finasteride 5mg, Jardiance 25, losartan 50, metformin, tamsulosin [facility]  William Hyde  Office: [phone]  Cell: [phone]  Can also be reached on [employer] Teams     Have been seeing outpatient for the past 6 months. He is an 82 year old male with pmh DM II very poorly controlled, CKD baseline cr 1.2-1.4, worsening BPH recently started on finasteride, CAD on asa and statin, prolonged grief disorder. Since the death of his wife, he has had increasing difficulty taking care of himself. 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 Depends on meals on wheels for his diet. He has a HCP Niraj [id number], and has [facility] sign dnr dni. Niraj is the brother of his late wife. In the past 6 months he has had multiple episodes of falls. Had a vasovagal near-syncope in the office once after getting emotional over late wife. ECG no new changes at that time sinus with PVCs and lateral t-wave changes. Prior to that he had 1degree av block in which metoprolol was discontinued. Regarding diabetes, have been titrating up his medications, recently adding SGLTi. Was on metformin and januvia prior. Due to patient's age and social conditions, did not think patient could handle insulin use. He did visit a dietician and was able to get his a1c <10. 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 Patient felt a little dizzy which he states is normal and goes away. He was sent to the hospital by ambulance. Patient called my office to inform us of his ER visit. Today endorsing some noncompliance with diet, had some sodas. Otherwise feels at usual state of health with any new complaints.    home meds: atorvastatin 40, citalopram 20, finasteride 5mg, Jardiance 25, losartan 50, metformin, tamsulosin, asa - there are concerns for non-adherence to medications, irregularly filled [facility]  William Hyde  Office: [phone]  Cell: [phone]  Can also be reached on [employer] Teams     Have been seeing outpatient for the past 6 months. He is an 82 year old male with pmh DM II very poorly controlled, CKD baseline cr 1.2-1.4, worsening BPH with incontinence recently started on finasteride, CAD on asa and statin, prolonged grief disorder. Since the death of his wife, he has had increasing difficulty taking care of himself. 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 Depends on meals on wheels for his diet. He has a HCP Niraj [phone], and [facility]. Niraj is the brother of his late wife. In the past 6 months he has had multiple episodes of falls. Had a vasovagal near-syncope in the office once after getting emotional over late wife. ECG no new changes at that time sinus with PVCs and lateral t-wave changes. Prior to that he had 1degree av block in which metoprolol was discontinued. Regarding diabetes, have been titrating up his medications, recently adding SGLTi. Was on metformin and januvia prior. Due to patient's age and social conditions, did not think patient could handle insulin use. He did visit a dietician and was able to get his a1c <10. 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 Patient felt a little dizzy which he states is normal and goes away. He was sent to the hospital by ambulance. Patient called my office to inform us of his ER visit. Today endorsing some noncompliance with diet, had some sodas. Otherwise feels at usual state of health with any new complaints.    home meds: atorvastatin 40, citalopram 20, finasteride 5mg, Jardiance 25, losartan 50, amlodipine, metformin, tamsulosin, asa, was also recently on thiazide diuretic, dced due to bph - there are concerns for non-adherence to medications, irregularly filled [facility]  William Hyde  Office: [phone]  Cell: [phone]  Can also be reached on [employer] Teams     Have been seeing outpatient for the past 6 months. He is an 82 year old male with pmh DM II very poorly controlled, CKD baseline cr 1.2-1.4, worsening BPH with incontinence recently started on finasteride, CAD on asa and statin, prolonged grief disorder. Since the death of his wife, he has had increasing difficulty taking care of himself. 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 Depends on meals on wheels for his diet. He has a HCP Niraj [id number], and [facility]. Niraj is the brother of his late wife. In the past 6 months he has had multiple episodes of falls. Had a vasovagal near-syncope in the office once after getting emotional over late wife. ECG no new changes at that time sinus with PVCs and lateral t-wave changes. Prior to that he had 1degree av block in which metoprolol was discontinued. Regarding diabetes, have been titrating up his medications, recently adding SGLTi. Was on metformin and januvia prior. Due to patient's age and social conditions, did not think patient could handle insulin use. He did visit a dietician and was able to get his a1c <10. 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 Patient felt a little dizzy which he states is normal and goes away. He was sent to the hospital by ambulance. Patient called my office to inform us of his ER visit. Today endorsing some noncompliance with diet, had some sodas. Otherwise feels at usual state of health with any new complaints.    home meds: atorvastatin 40, citalopram 20, finasteride 5mg, Jardiance 25, losartan 50, amlodipine, metformin, tamsulosin, asa, was also recently on thiazide diuretic, dced due to bph, also was on alprazolam prior, also dced due to dizziness- there are concerns for non-adherence to medications, irregularly filled [facility]  William Hyde  Office: [phone]  Cell: [phone]  Can also be reached on [employer] Teams     Have been seeing outpatient for the past 6 months. He is an 82 year old male with pmh DM II very poorly controlled, CKD baseline cr 1.2-1.4, worsening BPH with incontinence recently started on finasteride, CAD on asa and statin, MATT with CPAP(not in use for years), prolonged grief disorder. Since the death of his wife, he has had increasing difficulty taking care of himself. 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 Depends on meals on wheels for his diet. He has a HCP Niraj [phone], and has [facility] sign dnr dni. Niraj is the brother of his late wife. In the past 6 months he has had multiple episodes of falls. Had a vasovagal near-syncope in the office once after getting emotional over late wife. ECG no new changes at that time sinus with PVCs and lateral t-wave changes. Prior to that he had 1degree av block in which metoprolol was discontinued. Regarding diabetes, have been titrating up his medications, recently adding SGLTi. Was on metformin and januvia prior. Due to patient's age and social conditions, did not think patient could handle insulin use. He did visit a dietician and was able to get his a1c <10. 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 Patient felt a little dizzy which he states is normal and goes away. He was sent to the hospital by ambulance. Patient called my office to inform us of his ER visit. Today endorsing some noncompliance with diet, had some sodas. Otherwise feels at usual state of health with any new complaints.    home meds: atorvastatin 40, citalopram 20, finasteride 5mg, Jardiance 25, losartan 50, amlodipine, metformin, tamsulosin, asa, was also recently on thiazide diuretic, dced due to bph, also was on alprazolam prior, also dced due to dizziness- there are concerns for non-adherence to medications, irregularly filled

## 2023-03-16 NOTE — ED PROVIDER NOTE - PATIENT PORTAL LINK FT
You can access the FollowMyHealth Patient Portal offered by [facility] by registering at the following website: [email or web address]. By joining [employer]’s FollowMyHealth portal, you will also be able to view your health information using other applications (apps) compatible with our system.

## 2023-03-16 NOTE — ED PROVIDER NOTE - PROGRESS NOTE DETAILS
Karley Sethi MD (PGY3) -  Spoke to patient's PCP Dr. Hyde [phone] who states that he will be able to get an echocardiogram, MRI of brain and increase his diabetes medications on an outpatient basis. Karley Sethi MD (PGY3) -  Pt seen & reassessed.  Pt symptomatically improved.  NAD, pt tolerated PO & ambulated w/steady assisted gait in the ED (uses cane at home - walked using a cane here today).  We discussed the results of ED w/u w/patient (incl. presumptive Emergency Department dx, associated anticipatory guidance, stressing importance of prompt f/u, return precautions), & gave them a copy of results.  Patient verbalized understanding of ED course & agreed with our f/u recommendations, has decisional making capacity.  Pt st they will f/u w/PMD within the next 2 days; pt agrees to call today or tomorrow for an appointment. Spoke to PMD who states he will be able to see patient tomorrow and pursue work up. PMD also visited patient in the ER. Pt agrees to return to the ED if there is any worsening or concerning symptoms.

## 2023-03-16 NOTE — ED PROVIDER NOTE - CONSIDERATION OF ADMISSION OBSERVATION
pt with hyperglycemia and progressive decline in ability to care for self, will require admission for further workup and management Consideration of Admission/Observation

## 2023-03-16 NOTE — ED ADULT NURSE NOTE - PAIN RATING/NUMBER SCALE (0-10): ACTIVITY
0 (no pain/absence of nonverbal indicators of pain) Oral Minoxidil Counseling- I discussed with the patient the risks of oral minoxidil including but not limited to shortness of breath, swelling of the feet or ankles, dizziness, lightheadedness, unwanted hair growth and allergic reaction.  The patient verbalized understanding of the proper use and possible adverse effects of oral minoxidil.  All of the patient's questions and concerns were addressed.

## 2023-03-16 NOTE — ED ADULT NURSE NOTE - OBJECTIVE STATEMENT
Pt awake and alert, ambulatory with cane PMH hypertension, hyperlipidemia, non-insulin-dependent diabetes, presenting with dizziness x3 days.  Per patient and PMD collateral, patient has had dizziness described as lightheadedness that has been intermittent, no clear inciting trigger, denies any associated chest pain, palpitations, shortness of breath, diaphoresis but at times feels nauseous.  Further denies fever, chills, cough, abdominal pain, diarrhea, vertigo, numbness, focal weakness.  Lives alone,  since 5 years ago, per collateral obtained from PMD Dr. William Hyde, patient has had progressive difficulty taking care of himself since passing of his late wife. 20g IV placed in left hand, labs sent. NSR on monitor. Comfort measures provided, call bell in reach

## 2023-03-16 NOTE — ED PROVIDER NOTE - NSFOLLOWUPINSTRUCTIONS_ED_ALL_ED_FT
You were seen in the emergency department for: dizziness     Your diagnosis for this visit was: HYPERGLYCEMIA     YOU HAVE DIABETES. YOU MAY NEED MORE MEDICATIONS TO CONTROL YOUR DIABETES.     WE SPOKE TO YOUR PRIMARY CARE DOCTOR DR. SIDDIQUI. PLEASE CALL HIM FIRST THING TOMORROW MORNING TO COORDINATE AN APPOINTMENT. HE IS EXPECTING A CALL FROM YOU. HIS PHONE NUMBER [phone]    Please return to the Emergency Department if you experience any of the following symptoms:   - Shortness of breath or trouble breathing  - Pressure, pain or tightness in the chest  - Face drooping, arm weakness or speech difficulty  - Persistence of severe vomiting  - Head injury or loss of consciousness  - Nonstop bleeding or an open wound    Follow up with your primary care physician within the next 24-48 hours as discussed. In addition, we did not find evidence of a life threatening illness on your testing here today, but listed below are the specialists that will be necessary to see as an outpatient to continue the workup.  Please call the numbers listed below or [phone] to set up the necessary appointments.

## 2023-03-16 NOTE — ED PROVIDER NOTE - PHYSICAL EXAMINATION
Gen - NAD; well-appearing; A+Ox3   HEENT - NCAT, EOMI, moist mucous membranes, no nystagmus  Neck - supple, no edema  Resp - CTAB, no increased WOB  CV -  RRR, no m/r/g  Abd - soft, NT, ND; no guarding or rebound  Back - no midline, paraspinous, or CVA tenderness  MSK - FROM of b/l UE and LE, no gross deformities  Extrem - no LE edema/erythema/tenderness  Neuro - CN2-12 grossly intact, full motor strength and sensation to LT throughout  Skin - warm, well perfused

## 2023-07-14 ENCOUNTER — NON-APPOINTMENT (OUTPATIENT)
Age: 83
End: 2023-07-14

## 2023-07-14 ENCOUNTER — APPOINTMENT (OUTPATIENT)
Dept: CARDIOLOGY | Facility: CLINIC | Age: 83
End: 2023-07-14
Payer: MEDICARE

## 2023-07-14 VITALS
WEIGHT: 186 LBS | HEIGHT: 65 IN | OXYGEN SATURATION: 95 % | BODY MASS INDEX: 30.99 KG/M2 | SYSTOLIC BLOOD PRESSURE: 169 MMHG | DIASTOLIC BLOOD PRESSURE: 96 MMHG | HEART RATE: 92 BPM

## 2023-07-14 DIAGNOSIS — E11.3513 TYPE 2 DIABETES MELLITUS WITH PROLIFERATIVE DIABETIC RETINOPATHY WITH MACULAR EDEMA, BILATERAL: ICD-10-CM

## 2023-07-14 PROCEDURE — 93000 ELECTROCARDIOGRAM COMPLETE: CPT

## 2023-07-14 PROCEDURE — 99214 OFFICE O/P EST MOD 30 MIN: CPT | Mod: 25

## 2023-07-14 NOTE — ASSESSMENT
[FreeTextEntry1] : William Avendano has risk factors of coronary disease of hypertension hyperlipidemia and diabetes type 2.  He is status post an episode of unstable angina with left arm pain status post PTCI of the left circumflex and right coronary with drug-eluting stents.  He is clinically stable without angina in good spirits good exercise tolerance without shortness of breath chest pain or palpitations.   he continues to be depressed and lonely.  He is forgetful and often does not take his medications. there has been no significant change in his cardiac status without overt angina or CHF.  He does have an elevated BMI.  Blood pressure today is not well controlled and I am not certain he has been that careful with salt in his diet\par \par 1.  Chronic ischemic heart disease status post PTCI of the circumflex and right coronary no recurrence of arm pain or chest pain.  No symptoms of angina or signs of CHF\par 2.  Hyperlipidemia on statin\par \par 3.  Depression and anxiety markedly improved since his original admission in good spirits\par \par 4.  Hypertension    not well controlled and I am not certain exactly how he is taking his medication.  He may need additional medicine to Norvasc and metoprolol such as losartan\par 5.  Unsteady gait unlikely cardiac related but probably related to his overall depression\par \par

## 2023-07-14 NOTE — HISTORY OF PRESENT ILLNESS
[FreeTextEntry1] : William is 82 years old with a history of hypertension  and mild hyperlipidemia.  He is otherwise healthy and has not had any significant hospitalizations.  He is a non-smoker.  There is no significant family history of coronary artery disease\par \par To review, he presented to his internist Dr. Ricky Kwok with severe left arm pain and new EKG changes in the lateral leads.  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  He denies chest pain\par \par He lost his wife over 3 years ago which is created great sadness for him depression and anxiety.\par \par He was admitted to [facility] with a diagnosis of unstable angina.  ECG showed some nonspecific T wave changes in the lateral leads enzymes were normal\par \par He underwent cardiac catheterization approximately November 6, 2020.  LV function was normal.  He had severe disease of the circumflex and right coronary artery and the LAD had luminal disease.  He underwent successful stenting of the circumflex and right coronary with drug-eluting stents.  He was discharged the following day.  He did have chest pressure during the inflations but no significant left arm pain\par \par Since discharge he has felt well without chest pain chest pressure left arm pain or shortness of breath\par \par He continues to feel well without chest pain chest pressure shortness of breath.  He seems to be somewhat confused about his medications on last visit his blood pressure was over 150\par \par He denies chest pain chest pressure shortness of breath.  He denies dizziness or syncope\par \par He is tolerating his present regimen of medications\par \par He continues to feel well without chest pain chest pressure back pain or shortness of breath.  He is much less depressed though complains of sleeping a good deal during the day though does not bother him.\par \par He does have some unsteadiness on his feet particularly going up stairs often has to lean against the wall to steady himself.  He denies palpitations chest pain or significant shortness of breath.  He remains on stable medication\par \par EKG  on May 6  2022 normal sinus rhythm minor nonspecific ST-T wave changes unchanged from prior EKG\par \par He does not exercise that much but what ever activity he does he has no cardiac symptoms\par He continues to do well but is now in need of dental work.  He is still on Plavix\par \par Continues to be depressed.  He often forgets to take his medicines as he did this morning.  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  He has no headache dizziness or syncope.  He remains on stable medication\par \par Visit March 7, 2023: Since the last visit there is been no change in his status.  He denies chest pain or chest pressure.  He is chronically depressed even though it is 5 years since the passing of his wife.  There are days where he feels very energetic and other days in which he feels like he does not want to do anything.  He has no edema orthopnea PND\par \par EKG is normal sinus rhythm nonspecific ST-T wave changes baseline artifact no change March 7, 2023\par \par EKG March 2023 unchanged with normal sinus rhythm first-degree AV block nonspecific ST-T wave changes\par \par Visit July 14, 2023: Apparently the patient's blood pressure has not been well controlled.  Dr. Lozano apparently made some modification of his medications but I am not certain exactly what they are.  Presently he is only on Norvasc 10 mg and metoprolol 25 twice daily\par \par He denies chest pain chest pressure shortness of breath.  He overall feels well.  He is trying to be more careful with salt in his diet

## 2023-07-14 NOTE — DISCUSSION/SUMMARY
[FreeTextEntry1] : 1.  I will discuss with Dr. Hyde his regimen\par 2.  I discussed the importance of a low-salt diet and trying to lose some weight\par 3.  Consider adding an ARB or an ACE inhibitor to his regimen for better blood pressure control [EKG obtained to assist in diagnosis and management of assessed problem(s)] : EKG obtained to assist in diagnosis and management of assessed problem(s)

## 2023-07-14 NOTE — REASON FOR VISIT
[FreeTextEntry1] : Madhavi Connorlor 82 years old here for follow-up of his cardiac status.  He was seen on July 14, 2023

## 2023-08-14 ENCOUNTER — INPATIENT (INPATIENT)
Facility: HOSPITAL | Age: 83
LOS: 8 days | Discharge: HOME | End: 2023-08-23
Attending: INTERNAL MEDICINE | Admitting: INTERNAL MEDICINE
Payer: MEDICARE

## 2023-08-14 VITALS
DIASTOLIC BLOOD PRESSURE: 132 MMHG | OXYGEN SATURATION: 99 % | TEMPERATURE: 98 F | SYSTOLIC BLOOD PRESSURE: 172 MMHG | RESPIRATION RATE: 18 BRPM | HEART RATE: 100 BPM

## 2023-08-14 DIAGNOSIS — Z29.9 ENCOUNTER FOR PROPHYLACTIC MEASURES, UNSPECIFIED: ICD-10-CM

## 2023-08-14 DIAGNOSIS — F32.9 MAJOR DEPRESSIVE DISORDER, SINGLE EPISODE, UNSPECIFIED: ICD-10-CM

## 2023-08-14 DIAGNOSIS — I25.10 ATHEROSCLEROTIC HEART DISEASE OF NATIVE CORONARY ARTERY WITHOUT ANGINA PECTORIS: ICD-10-CM

## 2023-08-14 DIAGNOSIS — I16.0 HYPERTENSIVE URGENCY: ICD-10-CM

## 2023-08-14 DIAGNOSIS — R94.31 ABNORMAL ELECTROCARDIOGRAM [ECG] [EKG]: ICD-10-CM

## 2023-08-14 DIAGNOSIS — M25.512 PAIN IN LEFT SHOULDER: ICD-10-CM

## 2023-08-14 DIAGNOSIS — L03.012 CELLULITIS OF LEFT FINGER: ICD-10-CM

## 2023-08-14 DIAGNOSIS — Z95.5 PRESENCE OF CORONARY ANGIOPLASTY IMPLANT AND GRAFT: Chronic | ICD-10-CM

## 2023-08-14 DIAGNOSIS — R07.9 CHEST PAIN, UNSPECIFIED: ICD-10-CM

## 2023-08-14 DIAGNOSIS — Z79.899 OTHER LONG TERM (CURRENT) DRUG THERAPY: ICD-10-CM

## 2023-08-14 DIAGNOSIS — E11.65 TYPE 2 DIABETES MELLITUS WITH HYPERGLYCEMIA: ICD-10-CM

## 2023-08-14 DIAGNOSIS — N40.0 BENIGN PROSTATIC HYPERPLASIA WITHOUT LOWER URINARY TRACT SYMPTOMS: ICD-10-CM

## 2023-08-14 LAB
ALBUMIN SERPL ELPH-MCNC: 3.9 G/DL — SIGNIFICANT CHANGE UP (ref 3.3–5)
ALP SERPL-CCNC: 118 U/L — SIGNIFICANT CHANGE UP (ref 40–120)
ALT FLD-CCNC: 16 U/L — SIGNIFICANT CHANGE UP (ref 4–41)
ANION GAP SERPL CALC-SCNC: 10 MMOL/L — SIGNIFICANT CHANGE UP (ref 7–14)
AST SERPL-CCNC: 15 U/L — SIGNIFICANT CHANGE UP (ref 4–40)
BASE EXCESS BLDV CALC-SCNC: 4.3 MMOL/L — HIGH (ref -2–3)
BASOPHILS # BLD AUTO: 0.03 K/UL — SIGNIFICANT CHANGE UP (ref 0–0.2)
BASOPHILS NFR BLD AUTO: 0.3 % — SIGNIFICANT CHANGE UP (ref 0–2)
BILIRUB SERPL-MCNC: 0.6 MG/DL — SIGNIFICANT CHANGE UP (ref 0.2–1.2)
BLOOD GAS VENOUS COMPREHENSIVE RESULT: SIGNIFICANT CHANGE UP
BUN SERPL-MCNC: 20 MG/DL — SIGNIFICANT CHANGE UP (ref 7–23)
CALCIUM SERPL-MCNC: 8.9 MG/DL — SIGNIFICANT CHANGE UP (ref 8.4–10.5)
CHLORIDE BLDV-SCNC: 99 MMOL/L — SIGNIFICANT CHANGE UP (ref 96–108)
CHLORIDE SERPL-SCNC: 99 MMOL/L — SIGNIFICANT CHANGE UP (ref 98–107)
CO2 BLDV-SCNC: 31.2 MMOL/L — HIGH (ref 22–26)
CO2 SERPL-SCNC: 29 MMOL/L — SIGNIFICANT CHANGE UP (ref 22–31)
CREAT SERPL-MCNC: 1.06 MG/DL — SIGNIFICANT CHANGE UP (ref 0.5–1.3)
EGFR: 71 ML/MIN/1.73M2 — SIGNIFICANT CHANGE UP
EOSINOPHIL # BLD AUTO: 0.11 K/UL — SIGNIFICANT CHANGE UP (ref 0–0.5)
EOSINOPHIL NFR BLD AUTO: 1.2 % — SIGNIFICANT CHANGE UP (ref 0–6)
GAS PNL BLDV: 135 MMOL/L — LOW (ref 136–145)
GAS PNL BLDV: SIGNIFICANT CHANGE UP
GLUCOSE BLDC GLUCOMTR-MCNC: 240 MG/DL — HIGH (ref 70–99)
GLUCOSE BLDV-MCNC: 324 MG/DL — HIGH (ref 70–99)
GLUCOSE SERPL-MCNC: 310 MG/DL — HIGH (ref 70–99)
HCO3 BLDV-SCNC: 30 MMOL/L — HIGH (ref 22–29)
HCT VFR BLD CALC: 45.6 % — SIGNIFICANT CHANGE UP (ref 39–50)
HCT VFR BLDA CALC: 50 % — SIGNIFICANT CHANGE UP (ref 39–51)
HGB BLD CALC-MCNC: 16.5 G/DL — SIGNIFICANT CHANGE UP (ref 12.6–17.4)
HGB BLD-MCNC: 16.4 G/DL — SIGNIFICANT CHANGE UP (ref 13–17)
IANC: 5.75 K/UL — SIGNIFICANT CHANGE UP (ref 1.8–7.4)
IMM GRANULOCYTES NFR BLD AUTO: 0.4 % — SIGNIFICANT CHANGE UP (ref 0–0.9)
LACTATE BLDV-MCNC: 1.7 MMOL/L — SIGNIFICANT CHANGE UP (ref 0.5–2)
LYMPHOCYTES # BLD AUTO: 2.84 K/UL — SIGNIFICANT CHANGE UP (ref 1–3.3)
LYMPHOCYTES # BLD AUTO: 30.1 % — SIGNIFICANT CHANGE UP (ref 13–44)
MCHC RBC-ENTMCNC: 30.1 PG — SIGNIFICANT CHANGE UP (ref 27–34)
MCHC RBC-ENTMCNC: 36 GM/DL — SIGNIFICANT CHANGE UP (ref 32–36)
MCV RBC AUTO: 83.8 FL — SIGNIFICANT CHANGE UP (ref 80–100)
MONOCYTES # BLD AUTO: 0.65 K/UL — SIGNIFICANT CHANGE UP (ref 0–0.9)
MONOCYTES NFR BLD AUTO: 6.9 % — SIGNIFICANT CHANGE UP (ref 2–14)
NEUTROPHILS # BLD AUTO: 5.75 K/UL — SIGNIFICANT CHANGE UP (ref 1.8–7.4)
NEUTROPHILS NFR BLD AUTO: 61.1 % — SIGNIFICANT CHANGE UP (ref 43–77)
NRBC # BLD: 0 /100 WBCS — SIGNIFICANT CHANGE UP (ref 0–0)
NRBC # FLD: 0 K/UL — SIGNIFICANT CHANGE UP (ref 0–0)
PCO2 BLDV: 46 MMHG — SIGNIFICANT CHANGE UP (ref 42–55)
PH BLDV: 7.42 — SIGNIFICANT CHANGE UP (ref 7.32–7.43)
PLATELET # BLD AUTO: 274 K/UL — SIGNIFICANT CHANGE UP (ref 150–400)
PO2 BLDV: 31 MMHG — SIGNIFICANT CHANGE UP (ref 25–45)
POTASSIUM BLDV-SCNC: 3.1 MMOL/L — LOW (ref 3.5–5.1)
POTASSIUM SERPL-MCNC: 3.1 MMOL/L — LOW (ref 3.5–5.3)
POTASSIUM SERPL-SCNC: 3.1 MMOL/L — LOW (ref 3.5–5.3)
PROT SERPL-MCNC: 6.7 G/DL — SIGNIFICANT CHANGE UP (ref 6–8.3)
RBC # BLD: 5.44 M/UL — SIGNIFICANT CHANGE UP (ref 4.2–5.8)
RBC # FLD: 12.1 % — SIGNIFICANT CHANGE UP (ref 10.3–14.5)
SAO2 % BLDV: 59.1 % — LOW (ref 67–88)
SODIUM SERPL-SCNC: 138 MMOL/L — SIGNIFICANT CHANGE UP (ref 135–145)
TROPONIN T, HIGH SENSITIVITY RESULT: 31 NG/L — SIGNIFICANT CHANGE UP
WBC # BLD: 9.42 K/UL — SIGNIFICANT CHANGE UP (ref 3.8–10.5)
WBC # FLD AUTO: 9.42 K/UL — SIGNIFICANT CHANGE UP (ref 3.8–10.5)

## 2023-08-14 PROCEDURE — 99285 EMERGENCY DEPT VISIT HI MDM: CPT | Mod: 25

## 2023-08-14 PROCEDURE — 99223 1ST HOSP IP/OBS HIGH 75: CPT

## 2023-08-14 PROCEDURE — 71045 X-RAY EXAM CHEST 1 VIEW: CPT | Mod: 26

## 2023-08-14 PROCEDURE — 73030 X-RAY EXAM OF SHOULDER: CPT | Mod: 26,LT

## 2023-08-14 PROCEDURE — 10060 I&D ABSCESS SIMPLE/SINGLE: CPT

## 2023-08-14 RX ORDER — INSULIN LISPRO 100/ML
VIAL (ML) SUBCUTANEOUS
Refills: 0 | Status: DISCONTINUED | OUTPATIENT
Start: 2023-08-14 | End: 2023-08-23

## 2023-08-14 RX ORDER — DEXTROSE 50 % IN WATER 50 %
25 SYRINGE (ML) INTRAVENOUS ONCE
Refills: 0 | Status: DISCONTINUED | OUTPATIENT
Start: 2023-08-14 | End: 2023-08-23

## 2023-08-14 RX ORDER — ATORVASTATIN CALCIUM 80 MG/1
40 TABLET, FILM COATED ORAL AT BEDTIME
Refills: 0 | Status: DISCONTINUED | OUTPATIENT
Start: 2023-08-14 | End: 2023-08-23

## 2023-08-14 RX ORDER — HYDRALAZINE HCL 50 MG
2.5 TABLET ORAL ONCE
Refills: 0 | Status: COMPLETED | OUTPATIENT
Start: 2023-08-14 | End: 2023-08-14

## 2023-08-14 RX ORDER — PANTOPRAZOLE SODIUM 20 MG/1
40 TABLET, DELAYED RELEASE ORAL
Refills: 0 | Status: DISCONTINUED | OUTPATIENT
Start: 2023-08-14 | End: 2023-08-23

## 2023-08-14 RX ORDER — OMEPRAZOLE 10 MG/1
0 CAPSULE, DELAYED RELEASE ORAL
Qty: 0 | Refills: 0 | DISCHARGE

## 2023-08-14 RX ORDER — SODIUM CHLORIDE 9 MG/ML
1000 INJECTION, SOLUTION INTRAVENOUS
Refills: 0 | Status: DISCONTINUED | OUTPATIENT
Start: 2023-08-14 | End: 2023-08-23

## 2023-08-14 RX ORDER — LOSARTAN POTASSIUM 100 MG/1
100 TABLET, FILM COATED ORAL DAILY
Refills: 0 | Status: DISCONTINUED | OUTPATIENT
Start: 2023-08-14 | End: 2023-08-18

## 2023-08-14 RX ORDER — INSULIN LISPRO 100/ML
VIAL (ML) SUBCUTANEOUS AT BEDTIME
Refills: 0 | Status: DISCONTINUED | OUTPATIENT
Start: 2023-08-14 | End: 2023-08-23

## 2023-08-14 RX ORDER — AMLODIPINE BESYLATE 2.5 MG/1
10 TABLET ORAL DAILY
Refills: 0 | Status: DISCONTINUED | OUTPATIENT
Start: 2023-08-14 | End: 2023-08-23

## 2023-08-14 RX ORDER — DEXTROSE 50 % IN WATER 50 %
15 SYRINGE (ML) INTRAVENOUS ONCE
Refills: 0 | Status: DISCONTINUED | OUTPATIENT
Start: 2023-08-14 | End: 2023-08-23

## 2023-08-14 RX ORDER — FINASTERIDE 5 MG/1
5 TABLET, FILM COATED ORAL DAILY
Refills: 0 | Status: DISCONTINUED | OUTPATIENT
Start: 2023-08-14 | End: 2023-08-23

## 2023-08-14 RX ORDER — TRAMADOL HYDROCHLORIDE 50 MG/1
25 TABLET ORAL EVERY 8 HOURS
Refills: 0 | Status: DISCONTINUED | OUTPATIENT
Start: 2023-08-14 | End: 2023-08-14

## 2023-08-14 RX ORDER — GLUCAGON INJECTION, SOLUTION 0.5 MG/.1ML
1 INJECTION, SOLUTION SUBCUTANEOUS ONCE
Refills: 0 | Status: DISCONTINUED | OUTPATIENT
Start: 2023-08-14 | End: 2023-08-23

## 2023-08-14 RX ORDER — LIDOCAINE 4 G/100G
1 CREAM TOPICAL ONCE
Refills: 0 | Status: COMPLETED | OUTPATIENT
Start: 2023-08-14 | End: 2023-08-14

## 2023-08-14 RX ORDER — LANOLIN ALCOHOL/MO/W.PET/CERES
3 CREAM (GRAM) TOPICAL AT BEDTIME
Refills: 0 | Status: DISCONTINUED | OUTPATIENT
Start: 2023-08-14 | End: 2023-08-23

## 2023-08-14 RX ORDER — METOPROLOL TARTRATE 50 MG
25 TABLET ORAL
Refills: 0 | Status: DISCONTINUED | OUTPATIENT
Start: 2023-08-14 | End: 2023-08-22

## 2023-08-14 RX ORDER — ACETAMINOPHEN 500 MG
650 TABLET ORAL ONCE
Refills: 0 | Status: COMPLETED | OUTPATIENT
Start: 2023-08-14 | End: 2023-08-14

## 2023-08-14 RX ORDER — POTASSIUM CHLORIDE 20 MEQ
40 PACKET (EA) ORAL EVERY 4 HOURS
Refills: 0 | Status: COMPLETED | OUTPATIENT
Start: 2023-08-14 | End: 2023-08-15

## 2023-08-14 RX ORDER — ASPIRIN/CALCIUM CARB/MAGNESIUM 324 MG
81 TABLET ORAL DAILY
Refills: 0 | Status: DISCONTINUED | OUTPATIENT
Start: 2023-08-14 | End: 2023-08-23

## 2023-08-14 RX ORDER — DEXTROSE 50 % IN WATER 50 %
12.5 SYRINGE (ML) INTRAVENOUS ONCE
Refills: 0 | Status: DISCONTINUED | OUTPATIENT
Start: 2023-08-14 | End: 2023-08-23

## 2023-08-14 RX ORDER — TAMSULOSIN HYDROCHLORIDE 0.4 MG/1
0.4 CAPSULE ORAL AT BEDTIME
Refills: 0 | Status: DISCONTINUED | OUTPATIENT
Start: 2023-08-14 | End: 2023-08-23

## 2023-08-14 RX ORDER — CITALOPRAM 10 MG/1
20 TABLET, FILM COATED ORAL DAILY
Refills: 0 | Status: DISCONTINUED | OUTPATIENT
Start: 2023-08-14 | End: 2023-08-23

## 2023-08-14 RX ORDER — MUPIROCIN 20 MG/G
1 OINTMENT TOPICAL
Refills: 0 | Status: DISCONTINUED | OUTPATIENT
Start: 2023-08-14 | End: 2023-08-23

## 2023-08-14 RX ORDER — ACETAMINOPHEN 500 MG
650 TABLET ORAL EVERY 6 HOURS
Refills: 0 | Status: DISCONTINUED | OUTPATIENT
Start: 2023-08-14 | End: 2023-08-23

## 2023-08-14 RX ORDER — LIDOCAINE HCL 20 MG/ML
20 VIAL (ML) INJECTION ONCE
Refills: 0 | Status: COMPLETED | OUTPATIENT
Start: 2023-08-14 | End: 2023-08-14

## 2023-08-14 RX ORDER — ENOXAPARIN SODIUM 100 MG/ML
40 INJECTION SUBCUTANEOUS EVERY 24 HOURS
Refills: 0 | Status: DISCONTINUED | OUTPATIENT
Start: 2023-08-14 | End: 2023-08-23

## 2023-08-14 RX ADMIN — Medication 20 MILLILITER(S): at 19:51

## 2023-08-14 RX ADMIN — Medication 40 MILLIEQUIVALENT(S): at 23:38

## 2023-08-14 RX ADMIN — Medication 2.5 MILLIGRAM(S): at 23:39

## 2023-08-14 RX ADMIN — LIDOCAINE 1 PATCH: 4 CREAM TOPICAL at 18:34

## 2023-08-14 RX ADMIN — Medication 25 MILLIGRAM(S): at 23:39

## 2023-08-14 RX ADMIN — Medication 650 MILLIGRAM(S): at 18:36

## 2023-08-14 RX ADMIN — Medication 100 MILLIGRAM(S): at 23:39

## 2023-08-14 NOTE — ED PROVIDER NOTE - WR ORDER DATE AND TIME 2
"[facility] MEDICINE PROGRESS NOTE   Patient: Carri Arnold  IMDTX'N Date: 2022    YOB: 1939  Admission Date: 2022    MRN: [id number]  Inpatient LOS: 1    Room: [address] [place] [address] Day: Hospital Day: 2    Subjective   HISTORY AND SUBJECTIVE COMPLAINTS     Chief Complaint:   Generalized weakness, supratherapeutic INR    Interval History / Subjective:   No acute events overnight  Patient says she still feels persistently weak  Denies left elbow pain  No other complaint    Hospital Course: Carri Arnold is a 81year old female who presented on 2022 with complaints of Multiple Falls  . ROS:  Pertinent systems negative except as above. Objective   PHYSICAL EXAMINATION     Vital 24 Hour Range Most Recent Value   Temperature Temp  Min: 96.5 Â°F (35.8 Â°C)  Max: 98 Â°F (36.7 Â°C) 96.5 Â°F (35.8 Â°C)   Pulse Pulse  Min: 79  Max: 87 79   Respiratory Resp  Min: 16  Max: 31 20   Blood Pressure BP  Min: 118/72  Max: 195/125 125/67   Pulse Oximetry SpO2  Min: 83 %  Max: 99 % 95 %   Arterial BP No data recorded     O2 O2 Flow Rate (L/min)  Av L/min  Min: 2 L/min   Min taken time: 22 0740  Max: 2 L/min   Max taken time: 22 0740       Recorded Intake and Output:    Intake/Output Summary (Last 24 hours) at 2022 1149  Last data filed at 2022 0913  Gross per 24 hour   Intake 1908.33 ml   Output 200 ml   Net 1708.33 ml      Recorded Last Stool Occurrence:       Vital Most Recent Value First Value   Weight 79.9 kg (176 lb 2.4 oz) Weight: 81.6 kg (180 lb)   Height 5' 5"" (165.1 cm) Height: 5' 5\"" (165.1 cm)   BMI 29.31 N/A     PHYSICAL EXAM:  GENERAL APPEARANCE:  No acute distress, awake and oriented x3   EYES: Extraocular movements intact, no conjunctival injection. MOUTH/THROAT: Oropharynx appears normal. Moist mucosa. NECK: Neck is supple  LUNGS:  Unlabored respiratory effort.  Clear to auscultation bilaterally with normal inspiratory/expiratory " sounds. No rhonchi or wheezing   HEART:  Regular rate and rhythm, no murmurs  ABDOMEN: Soft, nontender, non-distended. Normal bowel sounds. EXTREMITIES: warm, well perfused, no edema. Large hematoma over left elbow    TEST RESULTS     Labs: The Laboratory values listed below have been reviewed and pertinent findings discussed in the Assessment and Plan.     Recent Results (from the past 24 hour(s))   Thyroid Stimulating Hormone Reflex    Collection Time: 04/21/22  4:56 AM   Result Value Ref Range    TSH 46.572 (H) 0.350 - 5.000 mcUnits/mL   Basic Metabolic Panel    Collection Time: 04/21/22  4:56 AM   Result Value Ref Range    Fasting Status      Sodium 141 135 - 145 mmol/L    Potassium 3.5 3.4 - 5.1 mmol/L    Chloride 106 98 - 107 mmol/L    Carbon Dioxide 30 21 - 32 mmol/L    Anion Gap 9 (L) 10 - 20 mmol/L    Glucose 92 70 - 99 mg/dL    BUN 21 (H) 6 - 20 mg/dL    Creatinine 0.94 0.51 - 0.95 mg/dL    Glomerular Filtration Rate 61 >=60    BUN/ Creatinine Ratio 22 7 - 25    Calcium 8.0 (L) 8.4 - 10.2 mg/dL   Prothrombin Time    Collection Time: 04/21/22  4:56 AM   Result Value Ref Range    Prothrombin Time 48.1 (H) 9.7 - 11.8 sec    INR 4.9     CBC with Automated Differential (performable only)    Collection Time: 04/21/22  4:56 AM   Result Value Ref Range    WBC 6.1 4.2 - 11.0 K/mcL    RBC 3.36 (L) 4.00 - 5.20 mil/mcL    HGB 10.8 (L) 12.0 - 15.5 g/dL    HCT 34.3 (L) 36.0 - 46.5 %    .1 (H) 78.0 - 100.0 fl    MCH 32.1 26.0 - 34.0 pg    MCHC 31.5 (L) 32.0 - 36.5 g/dL    RDW-CV 15.4 (H) 11.0 - 15.0 %    RDW-SD 57.8 (H) 39.0 - 50.0 fL     140 - 450 K/mcL    NRBC 0 <=0 /100 WBC    Neutrophil, Percent 65 %    Lymphocytes, Percent 21 %    Mono, Percent 10 %    Eosinophils, Percent 2 %    Basophils, Percent 1 %    Immature Granulocytes 1 %    Absolute Neutrophils 4.1 1.8 - 7.7 K/mcL    Absolute Lymphocytes 1.3 1.0 - 4.0 K/mcL    Absolute Monocytes 0.6 0.3 - 0.9 K/mcL    Absolute Eosinophils  0.1 0.0 - 0.5 K/mcL    Absolute Basophils 0.0 0.0 - 0.3 K/mcL    Absolute Immmature Granulocytes 0.0 0.0 - 0.2 K/mcL   Free T4    Collection Time: 04/21/22  4:56 AM   Result Value Ref Range    T4, Free 0.4 (L) 0.8 - 1.5 ng/dL      @cardiaclab@  INR (no units)   Date Value   04/21/2022 4.9       Radiology: Imaging studies have been reviewed and pertinent findings discussed in the Assessment and Plan. Results for orders placed or performed during the hospital encounter of 04/20/22 (from the past 48 hour(s))   CT HEAD WO CONTRAST    Impression    IMPRESSION:  Stable brain appearance. No hemorrhage infarct or acute  finding. CERVICAL SPINE:    . There is no vertebral compression fracture. There are extensive  degenerative disc changes with marked disc narrowing and spurring at C4-5  C5-6 and C6-7 levels. There is mild C4-5 retrolisthesis and C7-T1  anterolisthesis. There is facet arthritis without dislocation or fracture. The spinous processes are intact. There is anterior C1-2 spurring and  arthritis however C1 and the odontoid are both intact without fracture. Soft tissue planes are normal. There is some disc bulging and possible  moderate spinal stenosis at C5-6 level. There are some bony foraminal  encroachment changes with mild foraminal narrowing at some of the mid and  lower levels. There are no thyroid lobe nodules    The lung apices are clear with no pneumothorax finding. Degenerative cervical disc and facet changes with no fracture or acute  finding. . Stable cervical appearance         CT CERVICAL SPINE WO CONTRAST    Impression    IMPRESSION:  Stable brain appearance. No hemorrhage infarct or acute  finding. CERVICAL SPINE:    . There is no vertebral compression fracture. There are extensive  degenerative disc changes with marked disc narrowing and spurring at C4-5  C5-6 and C6-7 levels. There is mild C4-5 retrolisthesis and C7-T1  anterolisthesis.  There is facet arthritis without dislocation or fracture. The spinous processes are intact. There is anterior C1-2 spurring and  arthritis however C1 and the odontoid are both intact without fracture. Soft tissue planes are normal. There is some disc bulging and possible  moderate spinal stenosis at C5-6 level. There are some bony foraminal  encroachment changes with mild foraminal narrowing at some of the mid and  lower levels. There are no thyroid lobe nodules    The lung apices are clear with no pneumothorax finding. Degenerative cervical disc and facet changes with no fracture or acute  finding. . Stable cervical appearance         CT ABDOMEN PELVIS WO CONTRAST    Impression    IMPRESSION:  No evidence of an acute intra-abdominal process as outlined  above       XR Chest AP or PA    Impression    IMPRESSION:    1. Mild pulmonary venous congestion, similar to prior exam.       XR Elbow 3 View Left    Impression    IMPRESSION: No obvious fracture, dislocation, or elbow joint effusion. Please note that the patient is poorly positioned for this exam..     XR Ankle 3+ View Right    Impression    IMPRESSION: No acute fracture or dislocation. ANCILLARY ORDERS     Diet:  One Time Diet Regular  Regular Diet  Telemetry: On  Consults:    IP CONSULT TO ORTHOPEDIC SURGERY  Therapy Orders:   PT and OT Orders Placed this Encounter   Procedures   â¢ Occupational Therapy   â¢ Physical Therapy       Advance Care Planning    ADVANCED DIRECTIVES     Code Status: Full Resuscitation          ASSESSMENT AND PLAN     #Altered mental status  CT brain negative. No acute underlying infection identified  Appears to have resolved. May have been secondary to dehydration  -patient likely has a baseline memory deficit, speech therapy consulted for cognitive evaluation  Â   #Dehydration  -resolved status post gentle IV fluid hydration  Â   #Generalized weakness  Multiple falls at home going back to January of this year. High risk given her anticoagulated status.     Likely multifactorial, including alcohol use, opioid use, hypothyroidism, chronic debility  -PT OT  -patient reports that she is moving to a one-story house at the end of this month  Â   #Alcohol dependence           Serum alcohol undetected. Patient reports last drink was 3 days prior to admission. No withdrawal symptoms  -CIWA protocol  Â   #Supratherapeutic INR  5.3 on admission. Pt with hx of Mechanical prosthetic aortic valve  Trending down  -holding Coumadin  -discussed with Anticoagulation clinic nurse who spoke with Cardiology. Adjust patient's INR goal to 1.8-2.0   Â   #Left elbow hemarthrosis  -ortho surgery consult from the ER  -plan for possible hematoma evacuation once INR is under 2.0    # Right midfoot sprain  X-ray negative for fracture  -supportive care    #Hypothyroidism  New diagnosis. TSH 46, free T4 0.4  This is likely contributing to her chronic deconditioning  -start Synthroid at 50 mcg. Follow-up with primary care in 3-4 weeks  Â   Chronic medical problems  Â   #Chronic pain  -reviewed PDMP  -resume home medications  Â   #Chronic diastolic heart failure  Well compensated  Â   #Paroxysmal AFib  Currently in normal sinus  Â   # Vision deficit  Reports longstanding visual deficit and was told that she has cataracts, but never followed up for surgery  -follow-up with Ophthalmology  Â   #CKD stage 3  Stable    Smoking status: non smoker    Nutrition status: appropriate  Body mass index is 29.31 kg/mÂ². - Overweight BMI 25-29  DVT Prophylaxis: Coumadin         DISCHARGE PLANNING     The patient's treatment plans were discussed with patient. Attempted to contact family but no response     Recommendations for Discharge   SW     PT Post acute therapy ([facility] vs home and home therapy (lives alone, recent difficulty with stairs prior to admit))   OT Post acute therapy (vs home with in home PT)   SLP        Anticipated discharge destination: From home, to be determined  Expected Discharge Date:   To be determined  Barriers to Discharge: Patient is not medically ready and needs to remain in the hospital today due to Weakness, elevated INR        Iván Bolanos MD  Hospitalist  4/21/2022  11:49 AM 14-Aug-2023 16:25

## 2023-08-14 NOTE — H&P ADULT - NSICDXPASTMEDICALHX_GEN_ALL_CORE_FT
PAST MEDICAL HISTORY:  Asthma     CAD (coronary artery disease)     Depression     DM (diabetes mellitus)     HTN (hypertension)

## 2023-08-14 NOTE — H&P ADULT - NSHPLABSRESULTS_GEN_ALL_CORE
Personally reviewed labs:                        16.4   9.42  )-----------( 274      ( 14 Aug 2023 16:30 )             45.6     08-14-23 @ 16:30    138  |  99  |  20             --------------------------< 310<H>     3.1<L>  |  29  | 1.06    eGFR AA: --  eGFR N-AA: --    Calcium: 8.9  Phosphorus: --  Magnesium: --    AST: 15    ALT: 16  AlkPhos: 118  Protein: 6.7  Albumin: 3.9  TBili: 0.6  D-Bili: --    VBG - ( 14 Aug 2023 16:30 )  pH: 7.42  /  pCO2: 46    /  pO2: 31    / HCO3: 30    / Base Excess: 4.3   /  SvO2: 59.1  / Lactate: 1.7        Troponin 31      RADIOLOGY & ADDITIONAL TESTS:    EKG my independent interpretation: sinus arrhythmia with 1st degree AV block, no ST changes    Imaging reviewed:  CXR  FINDINGS:    The heart size is normal.  The lungs are clear.  There is no pneumothorax or pleural effusion.  There are no acute osseous abnormalities.    IMPRESSION:  Clear lungs.

## 2023-08-14 NOTE — H&P ADULT - PROBLEM SELECTOR PLAN 6
Per surescripts appears to be on jardiance, ozempic and metformin - hold inpatient  -ISS, A1C Per surescripts appears to be on jardiance, ozempic and metformin - hold inpatient  -A1C 10.5  -lantus 10u tonight  -ISS  -endocrine consult in AM

## 2023-08-14 NOTE — ED PROVIDER NOTE - CLINICAL SUMMARY MEDICAL DECISION MAKING FREE TEXT BOX
RL is a 82 year old male with past medical history significant for HTN, DMT2, PCI x3 presenting to the ED from PMD with abnormal EKG findings and  two days of left shoulder pain.  Given history of CAD and stent placement patient needs workup for ACS with repeat EKG and cardiac enzymes with cardiac monitor at bedside; although constellation of symptoms makes suspicion for ACS low at this time. Other differentials include pericarditis vs musculoskeletal pain. Low suspicion for pneumonia or DKA, but will obtain Chest XR and POCG to rule out.  PO acetaminophen 650mg, lidocaine patch for pain control. Routine vitals

## 2023-08-14 NOTE — H&P ADULT - NSHPPHYSICALEXAM_GEN_ALL_CORE
PHYSICAL EXAM:  Vital Signs Last 24 Hrs  T(C): 37.1 (08-14-23 @ 22:05)  T(F): 98.7 (08-14-23 @ 22:05), Max: 98.9 (08-14-23 @ 18:11)  HR: 89 (08-14-23 @ 22:05) (89 - 100)  BP: 163/109 (08-14-23 @ 22:05)  BP(mean): --  RR: 18 (08-14-23 @ 22:05) (18 - 18)  SpO2: 99% (08-14-23 @ 22:05) (99% - 99%)  Wt(kg): --    Constitutional: NAD, awake and alert, well developed  EYES: EOMI, conjunctiva clear  ENT:  Normal Hearing, no tonsillar exudates   Neck: Soft and supple , no thyromegaly   Respiratory: Breath sounds are clear bilaterally, No wheezing, rales or rhonchi, no tachypnea, no accessory muscle use  Cardiovascular: S1 and S2, regular rate and rhythm, no Murmurs, gallops or rubs, no JVD, no leg edema  Gastrointestinal: Bowel Sounds present, soft, nontender, nondistended, no guarding, no rebound  Extremities: No cyanosis or clubbing; warm to touch  Neurological: No focal deficits, CN II-XII intact bilaterally, sensation to light touch intact in all extremities.  Musculoskeletal: 5/5 strength in RUE, bilateral lower extremities. LUE shoulder extension limited to <90 degrees. No swelling, erythema or signs of trauma to L shoulder joint. No TTP to shoulder joint.  Skin: L 3rd digit paronychia s/p I&D. Redness, swelling noted. +TTP

## 2023-08-14 NOTE — H&P ADULT - PROBLEM SELECTOR PLAN 5
S/p CARMEN to pRCA and LCx in 2020  -cards c/s  -resume ASA, statin  -confirm if on metoprolol  -confirm if on plavix - patient unsure. It appears to have been >2 years since last stents so will hold off for now

## 2023-08-14 NOTE — ED PROVIDER NOTE - PROGRESS NOTE DETAILS
Emerita Barnes MD; [facility] PGY4: Patient s/p paronychia drainage with ~4-5ccs of pus and bloody drainage after. Wound irrigated, bacitracin applied and bandaged. Patient to be admitted for high risk chest pain.

## 2023-08-14 NOTE — PHARMACOTHERAPY INTERVENTION NOTE - COMMENTS
Medication history is incomplete. Outpatient pharmacy currently closed, will follow up once reopens. Please call spectra [phone] if you have any questions.

## 2023-08-14 NOTE — H&P ADULT - TIME BILLING
I had a face to face encounter with this patient. I spent 77 total minutes on the bedside interview and examination, coordination of care, counseling, chart review, order placement and documentation for this patient.

## 2023-08-14 NOTE — H&P ADULT - PROBLEM SELECTOR PLAN 4
S/p I&D by ED. no wound culture sent. No drainage at this time during my evaluation  -doxycycline 100mg BID  -mupirocin  -advised to return to ED if worsens or not improved in the next several days  -soak with warm water

## 2023-08-14 NOTE — ED ADULT NURSE REASSESSMENT NOTE - NS ED NURSE REASSESS COMMENT FT1
Received report from day RN. A&Ox4, ambulatory with cane at baseline. Resting comfortably, offers no complaints. Denies CP, SOB, N+V, diarrhea, headache, dizziness, fever, chills. 20g IV in place, no redness or swelling noted, flushes well. VS as noted. Awaiting further orders from provider. Safety measures in place

## 2023-08-14 NOTE — H&P ADULT - ASSESSMENT
82M with PMHx CAD s/p stents 2020, HTN, DM2, BPH, MDD presenting with left shoulder pain x2 days. no

## 2023-08-14 NOTE — H&P ADULT - PROBLEM SELECTOR PLAN 2
Denies CP, SOB. +L left shoulder pain worse with movement. Likely MSK related  -EKG sinus arrhythmia with 1st degree AV block. No syncope  -tele  -echo  -cardiology c/s in AM  -troponin 31, repeat trop with CKMB  -wells score 0

## 2023-08-14 NOTE — H&P ADULT - NSHPREVIEWOFSYSTEMS_GEN_ALL_CORE
ROS:    Constitutional: [ ] fevers [ ] chills  HEENT: [ ] postnasal drip [ ] nasal congestion  CV: [ ] chest pain [ ] orthopnea [ ] palpitations   Resp: [ ] cough [ ] shortness of breath [ ] dyspnea [ ] wheezing   GI: [ ] nausea [ ] vomiting [ ] diarrhea [ ] constipation [ ] abd pain  : [ ] dysuria  [ ] increased urinary frequency  Musculoskeletal: [ ] back pain  [ ] arthralgias [x ] L shoulder pain  Skin: [ ] rash [ ] itch [x] L 3rd digit paronychia  Neurological: [ ] headache [ ] dizziness [ ] syncope   Endocrine: [ ] diabetes [ ] thyroid problem  Hematologic/Lymphatic: [ ] anemia [ ] bleeding problem  [x ] All other systems negative

## 2023-08-14 NOTE — ED PROVIDER NOTE - OBJECTIVE STATEMENT
RL is a 82 year old male with past medical history significant for HTN, DMT2, PCI x3 presenting to the ED from PMD with abnormal EKG findings and  two days of left shoulder pain.  He states that the pain is located on the lateral aspect of his left shoulder and radiates down the left extremity and left side of the chest wall. He describes the pain as "achey" and it has been constant since its onset which occurred at rest. The pain is made worse with sitting up and improved when lying flat. He has taken Tylenol with little relief. The pain is not exertional. He endorses associated nausea. He denies vomiting, diaphoresis, crushing, ripping or tearing pain, or SOB. He has had no fever or recent illness.

## 2023-08-14 NOTE — H&P ADULT - PROBLEM SELECTOR PLAN 3
-180s, -120s  -med rec needs to be completed  -per surescripts on amlodipine 10mg qd and losartan 50mg qd potentially  ---resume amlodipine 10mg qd and increase to losartan 100mg qd  -IV hydral 2.5mg now  -resume metoprolol - confirm if on  -may add on hydral PO if persistently elevated

## 2023-08-14 NOTE — ED ADULT NURSE NOTE - OBJECTIVE STATEMENT
pt AOX3 coming with left shoulder pain x 2-3 days, denies trauma, pt has difficulties in raising arm w/o pain, denies numbness or weakness to upper/lower extremities/no CP, no SOB, no fever, labs sent, IV to right AC 20g angio cath placed, pending dispo.        Merced Sommer RN

## 2023-08-14 NOTE — ED PROVIDER NOTE - ATTENDING CONTRIBUTION TO CARE
83 yo M with pmhx of htn, dm, asthma, depression presents with 2ds of lt shoulder pain.  Denies any trauma.  No history of similar symptoms in the past.  Denies chest pain, shortness of breath, abdominal pain, nausea, vomiting, fevers, chills.  Patient also reports pain and swelling of the left third digit that has been ongoing for the past few days -denies any trauma to the finger.  Took Tylenol yesterday without relief.  States he was told he is not allowed to take NSAIDs not sure why.  Patient is well-appearing.  No distress.  Clear to auscultation bilaterally.  Regular rate and rhythm.  Abdomen soft nontender.  Equal  strength bilaterally.  Equal sensation in both upper extremities.  Full range of motion in the elbows bilaterally.  + TTP of the anterior left shoulder.  Patient is only actively able to range his shoulder to about 90 degrees.  Able to range the shoulder completely passively without pain.  Positive left third digit paronychia.  Differential diagnoses include but not limited to ACS, electrolyte abnormality, anemia, paronychia, rotator cuff injury, MSK pain. EKG is unchanged from last ekg.

## 2023-08-14 NOTE — H&P ADULT - PROBLEM SELECTOR PLAN 1
Atraumatic left shoulder pain. No swelling, erythema, signs of trauma or drainage. Limited ROM 2/2 pain. Possible OA vs adhesive capsulitis  -MRI L shoulder ordered  -ortho f/u as outpatient vs inpatient (if not improved with pain control)  -OT eval  -tylenol PRN, tramadol PRN  -Xray L shoulder: No acute fracture or dislocation.  -denies CP

## 2023-08-14 NOTE — H&P ADULT - HISTORY OF PRESENT ILLNESS
82M with PMHx CAD s/p stents 2020, HTN, DM2, BPH, MDD presenting with left shoulder pain x2 days. Patient notes lateral shoulder pain that is worse with sitting up and better with lying down. It is limiting his ability to raise his arms above his shoulder level. He denies recent trauma, cuts, procedures to this joint. There is no swelling, erythema as well and denies arthritis in this joint as well. He went to see his PMD today and out of concern for an abnormal EKG he was sent to the ED for evaluation. He denies having any chest pain (despite ED provider note mentioning this). He denies fevers, chills, cough, SOB, palpitations, LH, LOC, abdominal pain, vomiting, diarrhea, LE swelling. Patient does not know all his medications. In 2020 he had CARMEN x2 placed to LCx and pRCA. At the time he was placed on DAPT. He is unsure if he is still on plavix but notes taking baby ASA every day. On surescripts plavix is not listed. He denies exertional CP or COOPER at this time. He believes he is on amlodipine, ASA, atorvastatin, citalopram, metformin when mentioned to him but he is unsure    Of note in ED L 3rd digit paronychia found and s/p I&D

## 2023-08-14 NOTE — ED ADULT NURSE NOTE - NSFALLHARMRISKINTERV_ED_ALL_ED

## 2023-08-14 NOTE — ED ADULT TRIAGE NOTE - CHIEF COMPLAINT QUOTE
Pt presents to ED via EMS from MD office with c/o L sided arm pain x 2 days worse on movement. Pt was seen by PMD today and had abnormal EKG who then advised pt to come to ED for further eval.

## 2023-08-14 NOTE — ED PROVIDER NOTE - MUSCULOSKELETAL, MLM
No tenderness to palpation of anterior aspect of left shoulder. No pain on active or passive movement. Active range of motion of the left shoulder limited, passive ROM normal.

## 2023-08-15 LAB
A1C WITH ESTIMATED AVERAGE GLUCOSE RESULT: 10.5 % — HIGH (ref 4–5.6)
B-OH-BUTYR SERPL-SCNC: 0.4 MMOL/L — SIGNIFICANT CHANGE UP (ref 0–0.4)
CK MB BLD-MCNC: 3.6 % — HIGH (ref 0–2.5)
CK MB CFR SERPL CALC: 2.7 NG/ML — SIGNIFICANT CHANGE UP
CK SERPL-CCNC: 76 U/L — SIGNIFICANT CHANGE UP (ref 30–200)
ESTIMATED AVERAGE GLUCOSE: 255 — SIGNIFICANT CHANGE UP
GLUCOSE BLDC GLUCOMTR-MCNC: 190 MG/DL — HIGH (ref 70–99)
GLUCOSE BLDC GLUCOMTR-MCNC: 217 MG/DL — HIGH (ref 70–99)
GLUCOSE BLDC GLUCOMTR-MCNC: 249 MG/DL — HIGH (ref 70–99)
GLUCOSE BLDC GLUCOMTR-MCNC: 256 MG/DL — HIGH (ref 70–99)
GLUCOSE BLDC GLUCOMTR-MCNC: 274 MG/DL — HIGH (ref 70–99)
NT-PROBNP SERPL-SCNC: 360 PG/ML — HIGH
TROPONIN T, HIGH SENSITIVITY RESULT: 42 NG/L — SIGNIFICANT CHANGE UP
TSH SERPL-MCNC: 2.72 UIU/ML — SIGNIFICANT CHANGE UP (ref 0.27–4.2)

## 2023-08-15 PROCEDURE — 93306 TTE W/DOPPLER COMPLETE: CPT | Mod: 26

## 2023-08-15 RX ORDER — INDAPAMIDE 1.25 MG
0 TABLET ORAL
Qty: 0 | Refills: 0 | DISCHARGE

## 2023-08-15 RX ORDER — HYDRALAZINE HCL 50 MG
50 TABLET ORAL THREE TIMES A DAY
Refills: 0 | Status: DISCONTINUED | OUTPATIENT
Start: 2023-08-15 | End: 2023-08-17

## 2023-08-15 RX ORDER — INSULIN GLARGINE 100 [IU]/ML
10 INJECTION, SOLUTION SUBCUTANEOUS AT BEDTIME
Refills: 0 | Status: DISCONTINUED | OUTPATIENT
Start: 2023-08-15 | End: 2023-08-16

## 2023-08-15 RX ORDER — ASPIRIN/CALCIUM CARB/MAGNESIUM 324 MG
1 TABLET ORAL
Refills: 0 | DISCHARGE

## 2023-08-15 RX ORDER — TAMSULOSIN HYDROCHLORIDE 0.4 MG/1
0 CAPSULE ORAL
Qty: 0 | Refills: 0 | DISCHARGE

## 2023-08-15 RX ORDER — SEMAGLUTIDE 0.68 MG/ML
0 INJECTION, SOLUTION SUBCUTANEOUS
Qty: 0 | Refills: 0 | DISCHARGE

## 2023-08-15 RX ORDER — INSULIN GLARGINE 100 [IU]/ML
10 INJECTION, SOLUTION SUBCUTANEOUS ONCE
Refills: 0 | Status: COMPLETED | OUTPATIENT
Start: 2023-08-15 | End: 2023-08-15

## 2023-08-15 RX ORDER — ALBUTEROL 90 UG/1
2 AEROSOL, METERED ORAL
Refills: 0 | DISCHARGE

## 2023-08-15 RX ORDER — FINASTERIDE 5 MG/1
0 TABLET, FILM COATED ORAL
Qty: 0 | Refills: 0 | DISCHARGE

## 2023-08-15 RX ORDER — EMPAGLIFLOZIN 10 MG/1
1 TABLET, FILM COATED ORAL
Qty: 0 | Refills: 0 | DISCHARGE

## 2023-08-15 RX ORDER — HYDRALAZINE HCL 50 MG
5 TABLET ORAL ONCE
Refills: 0 | Status: COMPLETED | OUTPATIENT
Start: 2023-08-15 | End: 2023-08-15

## 2023-08-15 RX ORDER — OMEPRAZOLE 10 MG/1
1 CAPSULE, DELAYED RELEASE ORAL
Refills: 0 | DISCHARGE

## 2023-08-15 RX ORDER — TAMSULOSIN HYDROCHLORIDE 0.4 MG/1
1 CAPSULE ORAL
Qty: 0 | Refills: 0 | DISCHARGE

## 2023-08-15 RX ORDER — ALBUTEROL 90 UG/1
2 AEROSOL, METERED ORAL
Qty: 0 | Refills: 0 | DISCHARGE

## 2023-08-15 RX ORDER — AMLODIPINE BESYLATE 2.5 MG/1
1 TABLET ORAL
Refills: 0 | DISCHARGE

## 2023-08-15 RX ORDER — HYDRALAZINE HCL 50 MG
25 TABLET ORAL ONCE
Refills: 0 | Status: COMPLETED | OUTPATIENT
Start: 2023-08-15 | End: 2023-08-15

## 2023-08-15 RX ORDER — CITALOPRAM 10 MG/1
1 TABLET, FILM COATED ORAL
Qty: 0 | Refills: 0 | DISCHARGE

## 2023-08-15 RX ORDER — LOSARTAN POTASSIUM 100 MG/1
0 TABLET, FILM COATED ORAL
Qty: 0 | Refills: 0 | DISCHARGE

## 2023-08-15 RX ORDER — FINASTERIDE 5 MG/1
1 TABLET, FILM COATED ORAL
Refills: 0 | DISCHARGE

## 2023-08-15 RX ORDER — OMEPRAZOLE 10 MG/1
1 CAPSULE, DELAYED RELEASE ORAL
Qty: 0 | Refills: 0 | DISCHARGE

## 2023-08-15 RX ORDER — ATORVASTATIN CALCIUM 80 MG/1
1 TABLET, FILM COATED ORAL
Refills: 0 | DISCHARGE

## 2023-08-15 RX ORDER — ALPRAZOLAM 0.25 MG
1 TABLET ORAL
Qty: 0 | Refills: 0 | DISCHARGE

## 2023-08-15 RX ORDER — HYDRALAZINE HCL 50 MG
25 TABLET ORAL EVERY 8 HOURS
Refills: 0 | Status: DISCONTINUED | OUTPATIENT
Start: 2023-08-15 | End: 2023-08-15

## 2023-08-15 RX ORDER — TAMSULOSIN HYDROCHLORIDE 0.4 MG/1
1 CAPSULE ORAL
Refills: 0 | DISCHARGE

## 2023-08-15 RX ORDER — FESOTERODINE FUMARATE 8 MG/1
1 TABLET, FILM COATED, EXTENDED RELEASE ORAL
Qty: 0 | Refills: 0 | DISCHARGE

## 2023-08-15 RX ADMIN — Medication 5 MILLIGRAM(S): at 03:15

## 2023-08-15 RX ADMIN — FINASTERIDE 5 MILLIGRAM(S): 5 TABLET, FILM COATED ORAL at 11:58

## 2023-08-15 RX ADMIN — Medication 25 MILLIGRAM(S): at 14:57

## 2023-08-15 RX ADMIN — Medication 650 MILLIGRAM(S): at 17:39

## 2023-08-15 RX ADMIN — Medication 3: at 11:59

## 2023-08-15 RX ADMIN — INSULIN GLARGINE 10 UNIT(S): 100 INJECTION, SOLUTION SUBCUTANEOUS at 09:00

## 2023-08-15 RX ADMIN — LOSARTAN POTASSIUM 100 MILLIGRAM(S): 100 TABLET, FILM COATED ORAL at 05:57

## 2023-08-15 RX ADMIN — ATORVASTATIN CALCIUM 40 MILLIGRAM(S): 80 TABLET, FILM COATED ORAL at 22:51

## 2023-08-15 RX ADMIN — Medication 650 MILLIGRAM(S): at 08:36

## 2023-08-15 RX ADMIN — Medication 100 MILLIGRAM(S): at 05:57

## 2023-08-15 RX ADMIN — Medication 2: at 17:29

## 2023-08-15 RX ADMIN — Medication 650 MILLIGRAM(S): at 18:08

## 2023-08-15 RX ADMIN — Medication 25 MILLIGRAM(S): at 17:29

## 2023-08-15 RX ADMIN — Medication 50 MILLIGRAM(S): at 22:50

## 2023-08-15 RX ADMIN — Medication 40 MILLIEQUIVALENT(S): at 03:18

## 2023-08-15 RX ADMIN — ENOXAPARIN SODIUM 40 MILLIGRAM(S): 100 INJECTION SUBCUTANEOUS at 22:51

## 2023-08-15 RX ADMIN — Medication 2: at 08:22

## 2023-08-15 RX ADMIN — MUPIROCIN 1 APPLICATION(S): 20 OINTMENT TOPICAL at 17:37

## 2023-08-15 RX ADMIN — Medication 25 MILLIGRAM(S): at 15:30

## 2023-08-15 RX ADMIN — MUPIROCIN 1 APPLICATION(S): 20 OINTMENT TOPICAL at 09:26

## 2023-08-15 RX ADMIN — Medication 100 MILLIGRAM(S): at 17:29

## 2023-08-15 RX ADMIN — PANTOPRAZOLE SODIUM 40 MILLIGRAM(S): 20 TABLET, DELAYED RELEASE ORAL at 09:00

## 2023-08-15 RX ADMIN — INSULIN GLARGINE 10 UNIT(S): 100 INJECTION, SOLUTION SUBCUTANEOUS at 22:50

## 2023-08-15 RX ADMIN — CITALOPRAM 20 MILLIGRAM(S): 10 TABLET, FILM COATED ORAL at 11:58

## 2023-08-15 RX ADMIN — Medication 25 MILLIGRAM(S): at 08:19

## 2023-08-15 RX ADMIN — Medication 25 MILLIGRAM(S): at 05:57

## 2023-08-15 RX ADMIN — Medication 81 MILLIGRAM(S): at 11:58

## 2023-08-15 RX ADMIN — AMLODIPINE BESYLATE 10 MILLIGRAM(S): 2.5 TABLET ORAL at 05:58

## 2023-08-15 RX ADMIN — TAMSULOSIN HYDROCHLORIDE 0.4 MILLIGRAM(S): 0.4 CAPSULE ORAL at 22:50

## 2023-08-15 NOTE — PROGRESS NOTE ADULT - SUBJECTIVE AND OBJECTIVE BOX
HPI:  82M with PMHx CAD s/p stents 2020, HTN, DM2, BPH, MDD presenting with left shoulder pain x2 days. Patient notes lateral shoulder pain that is worse with sitting up and better with lying down. It is limiting his ability to raise his arms above his shoulder level. He denies recent trauma, cuts, procedures to this joint. There is no swelling, erythema as well and denies arthritis in this joint as well. He went to see his PMD today and out of concern for an abnormal EKG he was sent to the ED for evaluation. He denies having any chest pain (despite ED provider note mentioning this). He denies fevers, chills, cough, SOB, palpitations, LH, LOC, abdominal pain, vomiting, diarrhea, LE swelling. Patient does not know all his medications. In 2020 he had CARMEN x2 placed to LCx and pRCA. At the time he was placed on DAPT. He is unsure if he is still on plavix but notes taking baby ASA every day. On surescripts plavix is not listed. He denies exertional CP or COOPER at this time. He believes he is on amlodipine, ASA, atorvastatin, citalopram, metformin when mentioned to him but he is unsure    Of note in ED L 3rd digit paronychia found and s/p I&D (14 Aug 2023 22:52)      PAST MEDICAL & SURGICAL HISTORY:  DM (diabetes mellitus)      HTN (hypertension)      Depression      Asthma      CAD (coronary artery disease)      S/P coronary artery stent placement          Review of Systems:   CONSTITUTIONAL: No fever, weight loss, or fatigue  EYES: No eye pain, visual disturbances, or discharge  ENMT:  No difficulty hearing, tinnitus, vertigo; No sinus or throat pain  NECK: No pain or stiffness  BREASTS: No pain, masses, or nipple discharge  RESPIRATORY: No cough, wheezing, chills or hemoptysis; No shortness of breath  CARDIOVASCULAR: No chest pain, palpitations, dizziness, or leg swelling  GASTROINTESTINAL: No abdominal or epigastric pain. No nausea, vomiting, or hematemesis; No diarrhea or constipation. No melena or hematochezia.  GENITOURINARY: No dysuria, frequency, hematuria, or incontinence  NEUROLOGICAL: No headaches, memory loss, loss of strength, numbness, or tremors  SKIN: No itching, burning, rashes, or lesions   LYMPH NODES: No enlarged glands  ENDOCRINE: No heat or cold intolerance; No hair loss  MUSCULOSKELETAL: No joint pain or swelling; No muscle, back, or extremity pain  PSYCHIATRIC: No depression, anxiety, mood swings, or difficulty sleeping  HEME/LYMPH: No easy bruising, or bleeding gums  ALLERY AND IMMUNOLOGIC: No hives or eczema    Allergies    No Known Allergies    Intolerances        Social History:     FAMILY HISTORY:  No pertinent family history in first degree relatives        MEDICATIONS  (STANDING):  amLODIPine   Tablet 10 milliGRAM(s) Oral daily  aspirin enteric coated 81 milliGRAM(s) Oral daily  atorvastatin 40 milliGRAM(s) Oral at bedtime  citalopram 20 milliGRAM(s) Oral daily  dextrose 5%. 1000 milliLiter(s) (100 mL/Hr) IV Continuous <Continuous>  dextrose 5%. 1000 milliLiter(s) (50 mL/Hr) IV Continuous <Continuous>  dextrose 50% Injectable 25 Gram(s) IV Push once  dextrose 50% Injectable 12.5 Gram(s) IV Push once  dextrose 50% Injectable 25 Gram(s) IV Push once  doxycycline monohydrate Capsule 100 milliGRAM(s) Oral every 12 hours  enoxaparin Injectable 40 milliGRAM(s) SubCutaneous every 24 hours  finasteride 5 milliGRAM(s) Oral daily  glucagon  Injectable 1 milliGRAM(s) IntraMuscular once  hydrALAZINE 50 milliGRAM(s) Oral three times a day  insulin glargine Injectable (LANTUS) 10 Unit(s) SubCutaneous at bedtime  insulin lispro (ADMELOG) corrective regimen sliding scale   SubCutaneous three times a day before meals  insulin lispro (ADMELOG) corrective regimen sliding scale   SubCutaneous at bedtime  losartan 100 milliGRAM(s) Oral daily  metoprolol tartrate 25 milliGRAM(s) Oral two times a day  mupirocin 2% Ointment 1 Application(s) Topical two times a day  pantoprazole    Tablet 40 milliGRAM(s) Oral before breakfast  tamsulosin 0.4 milliGRAM(s) Oral at bedtime    MEDICATIONS  (PRN):  acetaminophen     Tablet .. 650 milliGRAM(s) Oral every 6 hours PRN Temp greater or equal to 38C (100.4F), Mild Pain (1 - 3)  dextrose Oral Gel 15 Gram(s) Oral once PRN Blood Glucose LESS THAN 70 milliGRAM(s)/deciliter  melatonin 3 milliGRAM(s) Oral at bedtime PRN Insomnia  traMADol 25 milliGRAM(s) Oral every 8 hours PRN Severe Pain (7 - 10)        CAPILLARY BLOOD GLUCOSE      POCT Blood Glucose.: 190 mg/dL (15 Aug 2023 21:48)  POCT Blood Glucose.: 217 mg/dL (15 Aug 2023 17:19)  POCT Blood Glucose.: 274 mg/dL (15 Aug 2023 11:53)  POCT Blood Glucose.: 249 mg/dL (15 Aug 2023 08:18)  POCT Blood Glucose.: 256 mg/dL (15 Aug 2023 03:40)  POCT Blood Glucose.: 240 mg/dL (14 Aug 2023 23:24)    I&O's Summary      PHYSICAL EXAM:  GENERAL: NAD, well-developed  HEAD:  Atraumatic, Normocephalic  EYES: EOMI, PERRLA, conjunctiva and sclera clear  NECK: Supple, No JVD  CHEST/LUNG: Clear to auscultation bilaterally; No wheeze  HEART: Regular rate and rhythm; No murmurs, rubs, or gallops  ABDOMEN: Soft, Nontender, Nondistended; Bowel sounds present  EXTREMITIES:  2+ Peripheral Pulses, No clubbing, cyanosis, or edema  PSYCH: AAOx3  NEUROLOGY: non-focal  SKIN: No rashes or lesions    LABS:                        16.4   9.42  )-----------( 274      ( 14 Aug 2023 16:30 )             45.6     08-14    138  |  99  |  20  ----------------------------<  310<H>  3.1<L>   |  29  |  1.06    Ca    8.9      14 Aug 2023 16:30    TPro  6.7  /  Alb  3.9  /  TBili  0.6  /  DBili  x   /  AST  15  /  ALT  16  /  AlkPhos  118  08-14      CARDIAC MARKERS ( 15 Aug 2023 02:00 )  x     / x     / 76 U/L / x     / 2.7 ng/mL      Urinalysis Basic - ( 14 Aug 2023 16:30 )    Color: x / Appearance: x / SG: x / pH: x  Gluc: 310 mg/dL / Ketone: x  / Bili: x / Urobili: x   Blood: x / Protein: x / Nitrite: x   Leuk Esterase: x / RBC: x / WBC x   Sq Epi: x / Non Sq Epi: x / Bacteria: x        RADIOLOGY & ADDITIONAL TESTS:    Imaging Personally Reviewed:    Consultant(s) Notes Reviewed:      Care Discussed with Consultants/Other Providers:

## 2023-08-15 NOTE — CHART NOTE - NSCHARTNOTEFT_GEN_A_CORE
Sent to the hospital by me due to concerns for hypertensive emergency, SBP in the office 200/120, being followed monthly with adequate prior blood pressures. Dr. Evangelista to continue management inpatient.    Currently takes:  amlodipine 10mg  atorvastatin 40mg  citalopram 20mg  finasteride 5mg  tamsulosin 0.4mg  indapamide 1.25mg qd  Jardiance 25mg qd  losartan 50mg qd  metformin ER 1000mg bid  Ozempic 0.25mg qweekly- yesterday was third dose    metoprolol was on previously 25mg tartrate qdaily, discontinued 12/2022 due to bradycardia, would be reasonable to initiate carvedilol  Plavix discontinued due to fall risk    Severely uncontrolled DM- would be a candidate for insulin, but due to home situation, not confident patient can manage insulin on his own. recent initation of GLP agonist  Follows outpatient cardio Dr. North

## 2023-08-15 NOTE — PHARMACOTHERAPY INTERVENTION NOTE - COMMENTS
Medication history is complete. Medication list updated in Outpatient Medication Record (OMR) per [facility], patient and Surescripts. Patient unable to verify entire medication list. Concern for non-compliance, please see OMR for medication-specific dispensing dates. Please call spectra [phone] if you have any questions.

## 2023-08-15 NOTE — ED ADULT NURSE REASSESSMENT NOTE - NS ED NURSE REASSESS COMMENT FT1
pt ambulated by cane. IV replaced, 20G right forearm. Resting comfortably, offers no complaints. Denies CP, SOB, N+V, diarrhea, headache, dizziness, fever, chills. VS as noted. Awaiting further orders from provider. Safety measures in place

## 2023-08-15 NOTE — ED ADULT NURSE REASSESSMENT NOTE - NS ED NURSE REASSESS COMMENT FT1
Break RN: Pt resting in stretcher at this time, offers no complaints. Respirations equal and unlabored. Blood work drawn and sent. Pt admitted to telemetry, pending inpatient bed assignment. No acute distress noted. Safety maintained, bed in lowest position, side rails raised, call bell in reach.

## 2023-08-16 LAB
ANION GAP SERPL CALC-SCNC: 10 MMOL/L — SIGNIFICANT CHANGE UP (ref 7–14)
BASOPHILS # BLD AUTO: 0.03 K/UL — SIGNIFICANT CHANGE UP (ref 0–0.2)
BASOPHILS NFR BLD AUTO: 0.3 % — SIGNIFICANT CHANGE UP (ref 0–2)
BUN SERPL-MCNC: 26 MG/DL — HIGH (ref 7–23)
CALCIUM SERPL-MCNC: 8.7 MG/DL — SIGNIFICANT CHANGE UP (ref 8.4–10.5)
CHLORIDE SERPL-SCNC: 104 MMOL/L — SIGNIFICANT CHANGE UP (ref 98–107)
CO2 SERPL-SCNC: 23 MMOL/L — SIGNIFICANT CHANGE UP (ref 22–31)
CREAT SERPL-MCNC: 1.33 MG/DL — HIGH (ref 0.5–1.3)
EGFR: 54 ML/MIN/1.73M2 — LOW
EOSINOPHIL # BLD AUTO: 0.03 K/UL — SIGNIFICANT CHANGE UP (ref 0–0.5)
EOSINOPHIL NFR BLD AUTO: 0.3 % — SIGNIFICANT CHANGE UP (ref 0–6)
GLUCOSE BLDC GLUCOMTR-MCNC: 197 MG/DL — HIGH (ref 70–99)
GLUCOSE BLDC GLUCOMTR-MCNC: 209 MG/DL — HIGH (ref 70–99)
GLUCOSE BLDC GLUCOMTR-MCNC: 210 MG/DL — HIGH (ref 70–99)
GLUCOSE BLDC GLUCOMTR-MCNC: 211 MG/DL — HIGH (ref 70–99)
GLUCOSE SERPL-MCNC: 258 MG/DL — HIGH (ref 70–99)
HCT VFR BLD CALC: 40.9 % — SIGNIFICANT CHANGE UP (ref 39–50)
HGB BLD-MCNC: 14.9 G/DL — SIGNIFICANT CHANGE UP (ref 13–17)
IANC: 6.44 K/UL — SIGNIFICANT CHANGE UP (ref 1.8–7.4)
IMM GRANULOCYTES NFR BLD AUTO: 0.3 % — SIGNIFICANT CHANGE UP (ref 0–0.9)
LYMPHOCYTES # BLD AUTO: 2.22 K/UL — SIGNIFICANT CHANGE UP (ref 1–3.3)
LYMPHOCYTES # BLD AUTO: 24.2 % — SIGNIFICANT CHANGE UP (ref 13–44)
MAGNESIUM SERPL-MCNC: 2.1 MG/DL — SIGNIFICANT CHANGE UP (ref 1.6–2.6)
MCHC RBC-ENTMCNC: 30.7 PG — SIGNIFICANT CHANGE UP (ref 27–34)
MCHC RBC-ENTMCNC: 36.4 GM/DL — HIGH (ref 32–36)
MCV RBC AUTO: 84.2 FL — SIGNIFICANT CHANGE UP (ref 80–100)
MONOCYTES # BLD AUTO: 0.42 K/UL — SIGNIFICANT CHANGE UP (ref 0–0.9)
MONOCYTES NFR BLD AUTO: 4.6 % — SIGNIFICANT CHANGE UP (ref 2–14)
NEUTROPHILS # BLD AUTO: 6.44 K/UL — SIGNIFICANT CHANGE UP (ref 1.8–7.4)
NEUTROPHILS NFR BLD AUTO: 70.3 % — SIGNIFICANT CHANGE UP (ref 43–77)
NRBC # BLD: 0 /100 WBCS — SIGNIFICANT CHANGE UP (ref 0–0)
NRBC # FLD: 0 K/UL — SIGNIFICANT CHANGE UP (ref 0–0)
PHOSPHATE SERPL-MCNC: 2.5 MG/DL — SIGNIFICANT CHANGE UP (ref 2.5–4.5)
PLATELET # BLD AUTO: 248 K/UL — SIGNIFICANT CHANGE UP (ref 150–400)
POTASSIUM SERPL-MCNC: 3.5 MMOL/L — SIGNIFICANT CHANGE UP (ref 3.5–5.3)
POTASSIUM SERPL-SCNC: 3.5 MMOL/L — SIGNIFICANT CHANGE UP (ref 3.5–5.3)
RBC # BLD: 4.86 M/UL — SIGNIFICANT CHANGE UP (ref 4.2–5.8)
RBC # FLD: 12.3 % — SIGNIFICANT CHANGE UP (ref 10.3–14.5)
SODIUM SERPL-SCNC: 137 MMOL/L — SIGNIFICANT CHANGE UP (ref 135–145)
WBC # BLD: 9.17 K/UL — SIGNIFICANT CHANGE UP (ref 3.8–10.5)
WBC # FLD AUTO: 9.17 K/UL — SIGNIFICANT CHANGE UP (ref 3.8–10.5)

## 2023-08-16 PROCEDURE — 99222 1ST HOSP IP/OBS MODERATE 55: CPT | Mod: GC

## 2023-08-16 RX ORDER — INSULIN LISPRO 100/ML
4 VIAL (ML) SUBCUTANEOUS
Refills: 0 | Status: DISCONTINUED | OUTPATIENT
Start: 2023-08-16 | End: 2023-08-23

## 2023-08-16 RX ORDER — INSULIN GLARGINE 100 [IU]/ML
14 INJECTION, SOLUTION SUBCUTANEOUS AT BEDTIME
Refills: 0 | Status: DISCONTINUED | OUTPATIENT
Start: 2023-08-16 | End: 2023-08-23

## 2023-08-16 RX ORDER — GABAPENTIN 400 MG/1
100 CAPSULE ORAL THREE TIMES A DAY
Refills: 0 | Status: DISCONTINUED | OUTPATIENT
Start: 2023-08-16 | End: 2023-08-23

## 2023-08-16 RX ORDER — POTASSIUM CHLORIDE 20 MEQ
40 PACKET (EA) ORAL ONCE
Refills: 0 | Status: COMPLETED | OUTPATIENT
Start: 2023-08-16 | End: 2023-08-16

## 2023-08-16 RX ADMIN — Medication 50 MILLIGRAM(S): at 12:33

## 2023-08-16 RX ADMIN — LOSARTAN POTASSIUM 100 MILLIGRAM(S): 100 TABLET, FILM COATED ORAL at 05:17

## 2023-08-16 RX ADMIN — GABAPENTIN 100 MILLIGRAM(S): 400 CAPSULE ORAL at 12:33

## 2023-08-16 RX ADMIN — Medication 2: at 09:21

## 2023-08-16 RX ADMIN — Medication 25 MILLIGRAM(S): at 17:11

## 2023-08-16 RX ADMIN — Medication 81 MILLIGRAM(S): at 09:27

## 2023-08-16 RX ADMIN — Medication 100 MILLIGRAM(S): at 17:12

## 2023-08-16 RX ADMIN — MUPIROCIN 1 APPLICATION(S): 20 OINTMENT TOPICAL at 06:42

## 2023-08-16 RX ADMIN — ATORVASTATIN CALCIUM 40 MILLIGRAM(S): 80 TABLET, FILM COATED ORAL at 21:56

## 2023-08-16 RX ADMIN — Medication 25 MILLIGRAM(S): at 05:17

## 2023-08-16 RX ADMIN — Medication 2: at 13:04

## 2023-08-16 RX ADMIN — FINASTERIDE 5 MILLIGRAM(S): 5 TABLET, FILM COATED ORAL at 09:27

## 2023-08-16 RX ADMIN — MUPIROCIN 1 APPLICATION(S): 20 OINTMENT TOPICAL at 17:07

## 2023-08-16 RX ADMIN — TAMSULOSIN HYDROCHLORIDE 0.4 MILLIGRAM(S): 0.4 CAPSULE ORAL at 21:56

## 2023-08-16 RX ADMIN — Medication 100 MILLIGRAM(S): at 05:17

## 2023-08-16 RX ADMIN — PANTOPRAZOLE SODIUM 40 MILLIGRAM(S): 20 TABLET, DELAYED RELEASE ORAL at 05:17

## 2023-08-16 RX ADMIN — CITALOPRAM 20 MILLIGRAM(S): 10 TABLET, FILM COATED ORAL at 09:27

## 2023-08-16 RX ADMIN — INSULIN GLARGINE 14 UNIT(S): 100 INJECTION, SOLUTION SUBCUTANEOUS at 21:55

## 2023-08-16 RX ADMIN — Medication 40 MILLIEQUIVALENT(S): at 09:27

## 2023-08-16 RX ADMIN — GABAPENTIN 100 MILLIGRAM(S): 400 CAPSULE ORAL at 21:55

## 2023-08-16 RX ADMIN — AMLODIPINE BESYLATE 10 MILLIGRAM(S): 2.5 TABLET ORAL at 05:17

## 2023-08-16 RX ADMIN — Medication 50 MILLIGRAM(S): at 21:56

## 2023-08-16 RX ADMIN — Medication 4 UNIT(S): at 18:11

## 2023-08-16 RX ADMIN — Medication 650 MILLIGRAM(S): at 01:18

## 2023-08-16 RX ADMIN — ENOXAPARIN SODIUM 40 MILLIGRAM(S): 100 INJECTION SUBCUTANEOUS at 21:56

## 2023-08-16 RX ADMIN — Medication 50 MILLIGRAM(S): at 05:17

## 2023-08-16 NOTE — OCCUPATIONAL THERAPY INITIAL EVALUATION ADULT - PHYSICAL ASSIST/NONPHYSICAL ASSIST:DRESS LOWER BODY, OT EVAL
Have Your Skin Lesions Been Treated?: not been treated Is This A New Presentation, Or A Follow-Up?: Growths How Severe Is Your Skin Lesion?: mild set-up required

## 2023-08-16 NOTE — PATIENT PROFILE ADULT - FALL HARM RISK - HARM RISK INTERVENTIONS

## 2023-08-16 NOTE — DIETITIAN INITIAL EVALUATION ADULT - NS FNS DIET ORDER
Diet, DASH/TLC:   Sodium & Cholesterol Restricted  Consistent Carbohydrate {Evening Snack} (CSTCHOSN) (08-14-23 @ 22:44) [Active]

## 2023-08-16 NOTE — OCCUPATIONAL THERAPY INITIAL EVALUATION ADULT - PERTINENT HX OF CURRENT PROBLEM, REHAB EVAL
82 year old male with history of CAD s/p stents 2020, HTN, DM2, BPH, MDD presenting with left shoulder pain. Xray negative for acute abnormality, pending MRI. Also found to have left finger paronychia s/p I&D in the ED.

## 2023-08-16 NOTE — CONSULT NOTE ADULT - SUBJECTIVE AND OBJECTIVE BOX
NOTE INCOMPLETE/ IN PROGRESS  *Please wait for attending attestation for official recommendations.     HPI:  82M with PMHx CAD s/p stents 2020, HTN, DM2, BPH, MDD presenting with left shoulder pain x2 days. Patient notes lateral shoulder pain that is worse with sitting up and better with lying down. It is limiting his ability to raise his arms above his shoulder level. He denies recent trauma, cuts, procedures to this joint. There is no swelling, erythema as well and denies arthritis in this joint as well. He went to see his PMD today and out of concern for an abnormal EKG he was sent to the ED for evaluation. He denies having any chest pain (despite ED provider note mentioning this). He denies fevers, chills, cough, SOB, palpitations, LH, LOC, abdominal pain, vomiting, diarrhea, LE swelling. Patient does not know all his medications. In 2020 he had CARMEN x2 placed to LCx and pRCA. At the time he was placed on DAPT. He is unsure if he is still on plavix but notes taking baby ASA every day. On surescripts plavix is not listed. He denies exertional CP or COOPER at this time. He believes he is on amlodipine, ASA, atorvastatin, citalopram, metformin when mentioned to him but he is unsure    Of note in ED L 3rd digit paronychia found and s/p I&D (14 Aug 2023 22:52)    Endocrine History:  Patient has a history of T2 DM that was diagnosed many years ago. Currently managed outpatient by his primary care physician (Dr. William Hyde). He currently takes metformin 1000mg BID, Jardiance 25mg qd, and Ozempic 0.25mg every week on Mondays (started recently, has only had 3 doses). The patient reports good adherence with his medications at home, although he admits he is not sure which medication is for what condition. He does not monitor his blood sugars at home and states that he does not have a glucometer or equipment for finger sticks. He expressed interest in a continuous glucose monitor. Because he doesn't check his sugars at home, he cannot confirm any instances of hypoglycemia, although he does note transient episodes of lightheadedness and occasional nausea which resolve after a few seconds on their own. He denies any complications of diabetes, including MI, stroke, neuropathy, or retinopathy.       PAST MEDICAL & SURGICAL HISTORY:  DM (diabetes mellitus)    HTN (hypertension)    Depression    Asthma    CAD (coronary artery disease)    S/P coronary artery stent placement      FAMILY HISTORY:  No pertinent family history in first degree relatives    Social History: lives alone, wife passed away 5 years ago. 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    Outpatient Medications (per PCP):  Amlodipine 10mg  Atorvastatin 40mg  Citalopram 20mg  Finasteride 5mg  Tamsulosin 0.4mg  Indapamide 1.25mg qd  Losartan 50mg qd  Jardiance 25mg qd  Metformin ER 1000mg bid  Ozempic 0.25mg qweekly    MEDICATIONS  (STANDING):  amLODIPine   Tablet 10 milliGRAM(s) Oral daily  aspirin enteric coated 81 milliGRAM(s) Oral daily  atorvastatin 40 milliGRAM(s) Oral at bedtime  citalopram 20 milliGRAM(s) Oral daily  dextrose 5%. 1000 milliLiter(s) (50 mL/Hr) IV Continuous <Continuous>  dextrose 5%. 1000 milliLiter(s) (100 mL/Hr) IV Continuous <Continuous>  dextrose 50% Injectable 25 Gram(s) IV Push once  dextrose 50% Injectable 12.5 Gram(s) IV Push once  dextrose 50% Injectable 25 Gram(s) IV Push once  doxycycline monohydrate Capsule 100 milliGRAM(s) Oral every 12 hours  enoxaparin Injectable 40 milliGRAM(s) SubCutaneous every 24 hours  finasteride 5 milliGRAM(s) Oral daily  gabapentin 100 milliGRAM(s) Oral three times a day  glucagon  Injectable 1 milliGRAM(s) IntraMuscular once  hydrALAZINE 50 milliGRAM(s) Oral three times a day  insulin glargine Injectable (LANTUS) 10 Unit(s) SubCutaneous at bedtime  insulin lispro (ADMELOG) corrective regimen sliding scale   SubCutaneous three times a day before meals  insulin lispro (ADMELOG) corrective regimen sliding scale   SubCutaneous at bedtime  losartan 100 milliGRAM(s) Oral daily  metoprolol tartrate 25 milliGRAM(s) Oral two times a day  mupirocin 2% Ointment 1 Application(s) Topical two times a day  pantoprazole    Tablet 40 milliGRAM(s) Oral before breakfast  tamsulosin 0.4 milliGRAM(s) Oral at bedtime    MEDICATIONS  (PRN):  acetaminophen     Tablet .. 650 milliGRAM(s) Oral every 6 hours PRN Temp greater or equal to 38C (100.4F), Mild Pain (1 - 3)  dextrose Oral Gel 15 Gram(s) Oral once PRN Blood Glucose LESS THAN 70 milliGRAM(s)/deciliter  melatonin 3 milliGRAM(s) Oral at bedtime PRN Insomnia  traMADol 25 milliGRAM(s) Oral every 8 hours PRN Severe Pain (7 - 10)      Allergies  No Known Allergies      Review of Systems:  Constitutional: No fever  Eyes: No blurry vision  Neuro: No tremors  HEENT: No pain  Cardiovascular: No chest pain, palpitations  Respiratory: No SOB, no cough  GI: No nausea, vomiting, abdominal pain  : No dysuria  Skin: no rash  Psych: no depression  Endocrine: no polyuria, polydipsia  Hem/lymph: no swelling  Osteoporosis: no fractures  MSK: +L shoulder pain and limited ROM.  ALL OTHER SYSTEMS REVIEWED AND NEGATIVE      PHYSICAL EXAM:  VITALS: T(C): 36.8 (08-16-23 @ 05:15)  T(F): 98.2 (08-16-23 @ 05:15), Max: 98.3 (08-15-23 @ 21:15)  HR: 78 (08-16-23 @ 05:15) (62 - 78)  BP: 111/71 (08-16-23 @ 05:15) (107/56 - 176/83)  RR:  (16 - 18)  SpO2:  (97% - 99%)  Wt(kg): --    GENERAL: NAD, well-groomed, well-developed  EYES: No proptosis, no lid lag, anicteric  HEENT:  Atraumatic, Normocephalic, moist mucous membranes  THYROID: Normal size, no palpable nodules  RESPIRATORY: Clear to auscultation bilaterally; No rales, rhonchi, wheezing  CARDIOVASCULAR: Regular rate and rhythm; No murmurs; no peripheral edema  GI: Soft, nontender, non distended, normal bowel sounds  SKIN: +LUE 3rd digit paronychia s/p I&D. +Tenderness, erythema, swelling  MUSCULOSKELETAL: +Limited ROM in L shoulder. Full ROM in all other extremities.  NEURO: Sensation to light touch intact, extraocular movements intact, no tremor  PSYCH: Alert and oriented x 3, normal affect, normal mood  CUSHING'S SIGNS: no striae      CAPILLARY BLOOD GLUCOSE  POCT Blood Glucose.: 211 mg/dL (16 Aug 2023 08:58)  POCT Blood Glucose.: 190 mg/dL (15 Aug 2023 21:48)  POCT Blood Glucose.: 217 mg/dL (15 Aug 2023 17:19)  POCT Blood Glucose.: 274 mg/dL (15 Aug 2023 11:53)      Labs:                        14.9   9.17  )-----------( 248      ( 16 Aug 2023 06:30 )             40.9       08-16  137  |  104  |  26<H>  ----------------------------<  258<H>  3.5   |  23  |  1.33<H>    eGFR: 54<L>    Ca    8.7      08-16  Mg     2.10     08-16  Phos  2.5     08-16    TPro  6.7  /  Alb  3.9  /  TBili  0.6  /  DBili  x   /  AST  15  /  ALT  16  /  AlkPhos  118  08-14    Thyroid Function Tests:  08-15 @ 02:00 TSH 2.72 FreeT4 -- T3 -- Anti TPO -- Anti Thyroglobulin Ab -- TSI --    A1C with Estimated Average Glucose Result: 10.5 % (08-15-23 @ 02:00)  A1C with Estimated Average Glucose Result: 9.9 % (03-16-23 @ 15:24)

## 2023-08-16 NOTE — OCCUPATIONAL THERAPY INITIAL EVALUATION ADULT - RANGE OF MOTION EXAMINATION, UPPER EXTREMITY
Left UE Passive ROM was WFL  (within functional limits)/Left UE Active Assistive ROM was WFL  (within functional limits)/Right UE Active ROM was WFL (within functional limits)

## 2023-08-16 NOTE — CONSULT NOTE ADULT - ASSESSMENT
Patient is an 81 y/o M with a PMHx of T2DM (not on insulin), CAD s/p stents 2020, HTN, BPH, and MDD presenting with left shoulder pain x2 days, concerning for OA vs. adhesive capsulitis. Endocrinology consulted for management of Type 2 DM.    #Type 2 Diabetes Mellitus  A1c: 10.5% (prior A1c 9.9% in 3/2023)  Home Meds: metformin 1000mg BID, Jardiance 25mg qd, Ozempic 0.25mg qweekly  Does not monitor blood sugars at home  - No clinical signs or lab findings concerning for DKA  - c/w Lantus 10u qhs  - c/w Admelog sliding scale TID before meals and qhs  - c/w FS TID before meals and qhs  - Diet: consistent carb  - RD consult    Discharge Planning:  - May consider discharging patient with home insulin pending clinical course. Pt has concerns about ability to consistently manage insulin regimen at home.  - Pt should follow up with Endocrinology outpatient at [address] [place] [address], [address], [place] [address]. [phone].    #Hypertension  Home meds: amlodipine 10mg qd, losartan 50mg qd  - Increased losartan to 100mg qd  - Started metoprolol 25mg BID  - Management as per primary team    #CAD (coronary artery disease).   s/p CARMEN to pRCA and LCx in 2020  Home meds: aspirin 81mg qd, atorvastatin 40mg qhs  - c/w home medications  - Management as per primary team Patient is an 83 y/o M with a PMHx of T2DM (not on insulin), CAD s/p stents 2020, HTN, BPH, and MDD presenting with left shoulder pain x2 days, concerning for OA vs. adhesive capsulitis. Endocrinology consulted for management of Type 2 DM.    #Type 2 Diabetes Mellitus  A1c: 10.5% (prior A1c 9.9% in 3/2023)  Home Meds: metformin 1000mg BID, Jardiance 25mg qd, Ozempic 0.25mg qweekly  Does not monitor blood sugars at home  - No clinical signs or lab findings concerning for DKA  - Increase Lantus to 14u qhs  - Start standing Admelog 4u TID before meals  - c/w Admelog sliding scale TID before meals and qhs  - c/w FS TID before meals and qhs  - Diet: consistent carb  - RD consult    Discharge Planning:  - Consider increasing Ozempic to optimize dosage (~1-2mg/qweekly)  - May need to adjust metformin dosage based on eGFR  - May consider discharging patient with basal insulin. Pt has concerns about ability to consistently manage insulin regimen at home.  - Consider adding pioglitazone to regimen if patient remains uncontrolled and cannot take insulin  - Provide patient with glucometer, lancets, and test strips on discharge  - Pt should follow up with Endocrinology outpatient at [address] [place] [address], [address], [place] [address]. [phone].    #Hypertension  Home meds: amlodipine 10mg qd, losartan 50mg qd  - Increased losartan to 100mg qd  - Started metoprolol 25mg BID  - Management as per primary team    #CAD (coronary artery disease).   s/p CARMEN to pRCA and LCx in 2020  Home meds: aspirin 81mg qd, atorvastatin 40mg qhs  - c/w home medications  - Management as per primary team

## 2023-08-16 NOTE — ADVANCED PRACTICE NURSE CONSULT - REASON FOR CONSULT
Patient seen on skin care rounds after wound care referral received for assessment of skin impairment and recommendations of topical management of the L 3rd digit. As per H&P, patient is a 82M with PMHx CAD s/p stents 2020, HTN, DM2, BPH, MDD presenting with left shoulder pain x2 days. Patient notes lateral shoulder pain that is worse with sitting up and better with lying down. It is limiting his ability to raise his arms above his shoulder level. He denies recent trauma, cuts, procedures to this joint. There is no swelling, erythema as well and denies arthritis in this joint as well. He went to see his PMD today and out of concern for an abnormal EKG he was sent to the ED for evaluation. He denies having any chest pain (despite ED provider note mentioning this). He denies fevers, chills, cough, SOB, palpitations, LH, LOC, abdominal pain, vomiting, diarrhea, LE swelling. Patient does not know all his medications. In 2020 he had CARMEN x2 placed to LCx and pRCA. At the time he was placed on DAPT. He is unsure if he is still on plavix but notes taking baby ASA every day. On surescripts plavix is not listed. He denies exertional CP or COOPER at this time. He believes he is on amlodipine, ASA, atorvastatin, citalopram, metformin when mentioned to him but he is unsure    Chart reviewed: WBC: 9.17, H&H: 14.9/40.9, Platelets: 248, A1C: 10.5, serum albumin: 3.9, BMI: 23.9, Pavel 19.

## 2023-08-16 NOTE — DIETITIAN INITIAL EVALUATION ADULT - PERTINENT LABORATORY DATA
08-16    137  |  104  |  26<H>  ----------------------------<  258<H>  3.5   |  23  |  1.33<H>    Ca    8.7      16 Aug 2023 06:30  Phos  2.5     08-16  Mg     2.10     08-16    TPro  6.7  /  Alb  3.9  /  TBili  0.6  /  DBili  x   /  AST  15  /  ALT  16  /  AlkPhos  118  08-14  POCT Blood Glucose.: 209 mg/dL (08-16-23 @ 12:24)  A1C with Estimated Average Glucose Result: 10.5 % (08-15-23 @ 02:00)  A1C with Estimated Average Glucose Result: 9.9 % (03-16-23 @ 15:24)

## 2023-08-16 NOTE — DIETITIAN INITIAL EVALUATION ADULT - PERTINENT MEDS FT
MEDICATIONS  (STANDING):  amLODIPine   Tablet 10 milliGRAM(s) Oral daily  aspirin enteric coated 81 milliGRAM(s) Oral daily  atorvastatin 40 milliGRAM(s) Oral at bedtime  citalopram 20 milliGRAM(s) Oral daily  dextrose 5%. 1000 milliLiter(s) (50 mL/Hr) IV Continuous <Continuous>  dextrose 5%. 1000 milliLiter(s) (100 mL/Hr) IV Continuous <Continuous>  dextrose 50% Injectable 25 Gram(s) IV Push once  dextrose 50% Injectable 12.5 Gram(s) IV Push once  dextrose 50% Injectable 25 Gram(s) IV Push once  doxycycline monohydrate Capsule 100 milliGRAM(s) Oral every 12 hours  enoxaparin Injectable 40 milliGRAM(s) SubCutaneous every 24 hours  finasteride 5 milliGRAM(s) Oral daily  gabapentin 100 milliGRAM(s) Oral three times a day  glucagon  Injectable 1 milliGRAM(s) IntraMuscular once  hydrALAZINE 50 milliGRAM(s) Oral three times a day  insulin glargine Injectable (LANTUS) 10 Unit(s) SubCutaneous at bedtime  insulin lispro (ADMELOG) corrective regimen sliding scale   SubCutaneous three times a day before meals  insulin lispro (ADMELOG) corrective regimen sliding scale   SubCutaneous at bedtime  losartan 100 milliGRAM(s) Oral daily  metoprolol tartrate 25 milliGRAM(s) Oral two times a day  mupirocin 2% Ointment 1 Application(s) Topical two times a day  pantoprazole    Tablet 40 milliGRAM(s) Oral before breakfast  tamsulosin 0.4 milliGRAM(s) Oral at bedtime    MEDICATIONS  (PRN):  acetaminophen     Tablet .. 650 milliGRAM(s) Oral every 6 hours PRN Temp greater or equal to 38C (100.4F), Mild Pain (1 - 3)  dextrose Oral Gel 15 Gram(s) Oral once PRN Blood Glucose LESS THAN 70 milliGRAM(s)/deciliter  melatonin 3 milliGRAM(s) Oral at bedtime PRN Insomnia  traMADol 25 milliGRAM(s) Oral every 8 hours PRN Severe Pain (7 - 10)

## 2023-08-16 NOTE — OCCUPATIONAL THERAPY INITIAL EVALUATION ADULT - MD ORDER
Occupational therapy to evaluate and treat. Occupational therapy to evaluate and treat.  Ambulate with cane.

## 2023-08-16 NOTE — ADVANCED PRACTICE NURSE CONSULT - ASSESSMENT
General: A&Ox4, OOB standby assistance, continent of urine and stool. Skin warm, dry with increased moisture in intertriginous folds, scattered areas of hyperpigmentation and hypopigmentation, scattered areas of ecchymosis on bilateral upper extremities with no hematomas. Blanchable erythema on bilateral heels.     L 3rd digit: Inferior to nail bed by cuticle s/p I&D measuring [id number] appearing with loose epidermis consistent with re-absorbed serous filled blister. Periwound with blanchable erythema circumferentially not extending >2cm. No drainage or odor noted, no pain as per patient. No induration, no crepitus, no fluctuance, no edema, no temperature changes, no overt signs of infection noted. Goals of care: Antimicrobial support

## 2023-08-16 NOTE — DIETITIAN INITIAL EVALUATION ADULT - ORAL INTAKE PTA/DIET HISTORY
Patient does not know his usual body weight. Patient reports good appetite and adequate po intake prior to hospitalization, does not follow any special diet at home. Patient occasionally getting pre-cooked meals from friends, and obtaining meals from local churches, denies any difficulty preparing/obtaining meals. Patient has no known food allergies.

## 2023-08-16 NOTE — OCCUPATIONAL THERAPY INITIAL EVALUATION ADULT - GENERAL OBSERVATIONS, REHAB EVAL
Patient received semisupine in bed in NAD; agreeable to participate in OT evaluation. +tele. HR 70 bpm.

## 2023-08-16 NOTE — CONSULT NOTE ADULT - ATTENDING COMMENTS
Patient is a 82-year-old man with history of type 2 diabetes, poorly controlled with A1c of 10.5%, currently on metformin at 1000 mg twice daily, Jardiance 25 mg once daily and Ozempic 0.25 mg once weekly that was started about 3 weeks ago.  Follow-up with outpatient primary care doctor.  Does not monitor glucose.  Lives at home alone.  Currently uncertain if he is able to do basal insulin once daily.  For now recommend Lantus 40 units at bedtime, Admelog 4 units 3 times daily with meals, low-dose sliding scale before every meal and at bedtime, endocrine will follow-up and adjust inpatient insulin regimen.    For discharge, recommend uptitrating Ozempic to therapeutic dose.  Adjust metformin dosage based on EGFR upon discharge.    Continue with Jardiance 25 mg once daily.  If still uncontrolled, and numb feeling to use insulin, can consider using pioglitazone 15 mg once daily.  Patient has no known history of CHF in the past..    BP and cholesterol management as per Dr Robles's note above.

## 2023-08-16 NOTE — DIETITIAN INITIAL EVALUATION ADULT - PROBLEM SELECTOR PROBLEM 5
CAD (coronary artery disease) I have reviewed and confirmed nurses' notes for patient's medications, allergies, medical history, and surgical history.

## 2023-08-16 NOTE — ADVANCED PRACTICE NURSE CONSULT - RECOMMEDATIONS
Recommending endocrinology consult for A1C 10.5    Topical Recommendations    L 3rd digit: Cleanse with NS, pat dry. Apply Liquid barrier film to periwound skin (allow to dry). Apply Mupirocin to base of wound, re-apply twice a day.    Continue low air loss bed therapy, continue heel elevation, continue to turn & reposition per protocol, soft pillow between bony prominences, continue moisture management with barrier creams & single breathable pad, continue measures to decrease friction/shear/pressure. Continue with nutritional support as per dietary/orders.     Plan of care discussed with primary RN and patient, all questions answered.     Please contact Wound Care Service Line if we can be of further assistance (ext [phone]).

## 2023-08-16 NOTE — DIETITIAN INITIAL EVALUATION ADULT - ADD RECOMMEND
1. Monitor weight, labs, po intake and tolerance, bowel movement, skin integrity.   2. Encourage PO intake and honor food preferences as able.

## 2023-08-16 NOTE — PROGRESS NOTE ADULT - SUBJECTIVE AND OBJECTIVE BOX
HPI:  82M with PMHx CAD s/p stents 2020, HTN, DM2, BPH, MDD presenting with left shoulder pain x2 days. Patient notes lateral shoulder pain that is worse with sitting up and better with lying down. It is limiting his ability to raise his arms above his shoulder level. He denies recent trauma, cuts, procedures to this joint. There is no swelling, erythema as well and denies arthritis in this joint as well. He went to see his PMD today and out of concern for an abnormal EKG he was sent to the ED for evaluation. He denies having any chest pain (despite ED provider note mentioning this). He denies fevers, chills, cough, SOB, palpitations, LH, LOC, abdominal pain, vomiting, diarrhea, LE swelling. Patient does not know all his medications. In 2020 he had CARMEN x2 placed to LCx and pRCA. At the time he was placed on DAPT. He is unsure if he is still on plavix but notes taking baby ASA every day. On surescripts plavix is not listed. He denies exertional CP or COOPER at this time. He believes he is on amlodipine, ASA, atorvastatin, citalopram, metformin when mentioned to him but he is unsure    Of note in ED L 3rd digit paronychia found and s/p I&D (14 Aug 2023 22:52)      PAST MEDICAL & SURGICAL HISTORY:  DM (diabetes mellitus)      HTN (hypertension)      Depression      Asthma      CAD (coronary artery disease)      S/P coronary artery stent placement          Review of Systems:   CONSTITUTIONAL: No fever, weight loss, or fatigue  EYES: No eye pain, visual disturbances, or discharge  ENMT:  No difficulty hearing, tinnitus, vertigo; No sinus or throat pain  NECK: No pain or stiffness  BREASTS: No pain, masses, or nipple discharge  RESPIRATORY: No cough, wheezing, chills or hemoptysis; No shortness of breath  CARDIOVASCULAR: No chest pain, palpitations, dizziness, or leg swelling  GASTROINTESTINAL: No abdominal or epigastric pain. No nausea, vomiting, or hematemesis; No diarrhea or constipation. No melena or hematochezia.  GENITOURINARY: No dysuria, frequency, hematuria, or incontinence  NEUROLOGICAL: No headaches, memory loss, loss of strength, numbness, or tremors  SKIN: No itching, burning, rashes, or lesions   LYMPH NODES: No enlarged glands  ENDOCRINE: No heat or cold intolerance; No hair loss  MUSCULOSKELETAL: No joint pain or swelling; No muscle, back, or extremity pain  PSYCHIATRIC: No depression, anxiety, mood swings, or difficulty sleeping  HEME/LYMPH: No easy bruising, or bleeding gums  ALLERY AND IMMUNOLOGIC: No hives or eczema    Allergies    No Known Allergies    Intolerances        Social History:     FAMILY HISTORY:  No pertinent family history in first degree relatives        MEDICATIONS  (STANDING):  amLODIPine   Tablet 10 milliGRAM(s) Oral daily  aspirin enteric coated 81 milliGRAM(s) Oral daily  atorvastatin 40 milliGRAM(s) Oral at bedtime  citalopram 20 milliGRAM(s) Oral daily  dextrose 5%. 1000 milliLiter(s) (100 mL/Hr) IV Continuous <Continuous>  dextrose 5%. 1000 milliLiter(s) (50 mL/Hr) IV Continuous <Continuous>  dextrose 50% Injectable 25 Gram(s) IV Push once  dextrose 50% Injectable 12.5 Gram(s) IV Push once  dextrose 50% Injectable 25 Gram(s) IV Push once  doxycycline monohydrate Capsule 100 milliGRAM(s) Oral every 12 hours  enoxaparin Injectable 40 milliGRAM(s) SubCutaneous every 24 hours  finasteride 5 milliGRAM(s) Oral daily  glucagon  Injectable 1 milliGRAM(s) IntraMuscular once  hydrALAZINE 50 milliGRAM(s) Oral three times a day  insulin glargine Injectable (LANTUS) 10 Unit(s) SubCutaneous at bedtime  insulin lispro (ADMELOG) corrective regimen sliding scale   SubCutaneous three times a day before meals  insulin lispro (ADMELOG) corrective regimen sliding scale   SubCutaneous at bedtime  losartan 100 milliGRAM(s) Oral daily  metoprolol tartrate 25 milliGRAM(s) Oral two times a day  mupirocin 2% Ointment 1 Application(s) Topical two times a day  pantoprazole    Tablet 40 milliGRAM(s) Oral before breakfast  tamsulosin 0.4 milliGRAM(s) Oral at bedtime    MEDICATIONS  (PRN):  acetaminophen     Tablet .. 650 milliGRAM(s) Oral every 6 hours PRN Temp greater or equal to 38C (100.4F), Mild Pain (1 - 3)  dextrose Oral Gel 15 Gram(s) Oral once PRN Blood Glucose LESS THAN 70 milliGRAM(s)/deciliter  melatonin 3 milliGRAM(s) Oral at bedtime PRN Insomnia  traMADol 25 milliGRAM(s) Oral every 8 hours PRN Severe Pain (7 - 10)      CAPILLARY BLOOD GLUCOSE      POCT Blood Glucose.: 210 mg/dL (16 Aug 2023 16:46)  POCT Blood Glucose.: 209 mg/dL (16 Aug 2023 12:24)  POCT Blood Glucose.: 211 mg/dL (16 Aug 2023 08:58)  POCT Blood Glucose.: 190 mg/dL (15 Aug 2023 21:48)  I&O's Summary      PHYSICAL EXAM:  GENERAL: NAD, well-developed  HEAD:  Atraumatic, Normocephalic  EYES: EOMI, PERRLA, conjunctiva and sclera clear  NECK: Supple, No JVD  CHEST/LUNG: Clear to auscultation bilaterally; No wheeze  HEART: Regular rate and rhythm; No murmurs, rubs, or gallops  ABDOMEN: Soft, Nontender, Nondistended; Bowel sounds present  EXTREMITIES:  2+ Peripheral Pulses, No clubbing, cyanosis, or edema  PSYCH: AAOx3  NEUROLOGY: non-focal  SKIN: No rashes or lesions                              14.9   9.17  )-----------( 248      ( 16 Aug 2023 06:30 )             40.9       CARDIAC MARKERS ( 15 Aug 2023 02:00 )  x     / x     / 76 U/L / x     / 2.7 ng/mL          137|104|26<258  3.5|23|1.33  8.7,2.10,2.5  08-16 @ 06:30    RADIOLOGY & ADDITIONAL TESTS:    Imaging Personally Reviewed:    Consultant(s) Notes Reviewed:      Care Discussed with Consultants/Other Providers:

## 2023-08-16 NOTE — DIETITIAN INITIAL EVALUATION ADULT - OTHER INFO
Patient is an 81 y/o M with a PMHx of T2DM (not on insulin), CAD s/p stents 2020, HTN, BPH, and MDD presenting with left shoulder pain x2 days, concerning for OA vs. adhesive capsulitis, per chart.     Patient reports good appetite. Patient denies any difficulty chewing or swallowing, any nausea, vomiting, diarrhea, constipation during visit. Reports last bowel movement 8/15/2023. Food preferences obtained. Weight indicated in chart: 71.4kg (8/15, per flow sheet, is likely inaccurate). Weight obtained via bed scale during visit, 81.8kg. As per Shireen JEAN, weight history: 84.4kg(7/15), 84.8kg (3/10). Noted patient with non-sig weight loss of -2.6kg/-3%BW x 1 month, will continue to monitor weight trend. Noted elevated HgA1c- 10.5%(8/15), endo following. RD provided the patient with extensive verbal and written DM diet education; including, carb counting, label reading, meal planning, pre-prandial and post-prandial finger stick goals, and HbA1c goal. Patient was also made aware of the physiological implications of poor glycemic control. Also discussed Heart Healthy diet recommendations. Importance of having consistent carbohydrate with protein at each meals emphasized. Patient is receptive to information provided.

## 2023-08-17 ENCOUNTER — TRANSCRIPTION ENCOUNTER (OUTPATIENT)
Age: 83
End: 2023-08-17

## 2023-08-17 LAB
GLUCOSE BLDC GLUCOMTR-MCNC: 149 MG/DL — HIGH (ref 70–99)
GLUCOSE BLDC GLUCOMTR-MCNC: 150 MG/DL — HIGH (ref 70–99)
GLUCOSE BLDC GLUCOMTR-MCNC: 178 MG/DL — HIGH (ref 70–99)
GLUCOSE BLDC GLUCOMTR-MCNC: 220 MG/DL — HIGH (ref 70–99)

## 2023-08-17 PROCEDURE — 99232 SBSQ HOSP IP/OBS MODERATE 35: CPT | Mod: GC

## 2023-08-17 RX ORDER — HYDRALAZINE HCL 50 MG
25 TABLET ORAL THREE TIMES A DAY
Refills: 0 | Status: DISCONTINUED | OUTPATIENT
Start: 2023-08-17 | End: 2023-08-23

## 2023-08-17 RX ADMIN — ATORVASTATIN CALCIUM 40 MILLIGRAM(S): 80 TABLET, FILM COATED ORAL at 23:01

## 2023-08-17 RX ADMIN — ENOXAPARIN SODIUM 40 MILLIGRAM(S): 100 INJECTION SUBCUTANEOUS at 23:01

## 2023-08-17 RX ADMIN — AMLODIPINE BESYLATE 10 MILLIGRAM(S): 2.5 TABLET ORAL at 13:24

## 2023-08-17 RX ADMIN — Medication 4 UNIT(S): at 13:22

## 2023-08-17 RX ADMIN — GABAPENTIN 100 MILLIGRAM(S): 400 CAPSULE ORAL at 05:40

## 2023-08-17 RX ADMIN — Medication 81 MILLIGRAM(S): at 12:28

## 2023-08-17 RX ADMIN — Medication 4 UNIT(S): at 18:03

## 2023-08-17 RX ADMIN — INSULIN GLARGINE 14 UNIT(S): 100 INJECTION, SOLUTION SUBCUTANEOUS at 23:02

## 2023-08-17 RX ADMIN — Medication 50 MILLIGRAM(S): at 05:42

## 2023-08-17 RX ADMIN — Medication 100 MILLIGRAM(S): at 05:41

## 2023-08-17 RX ADMIN — GABAPENTIN 100 MILLIGRAM(S): 400 CAPSULE ORAL at 13:24

## 2023-08-17 RX ADMIN — Medication 2: at 18:03

## 2023-08-17 RX ADMIN — MUPIROCIN 1 APPLICATION(S): 20 OINTMENT TOPICAL at 05:41

## 2023-08-17 RX ADMIN — PANTOPRAZOLE SODIUM 40 MILLIGRAM(S): 20 TABLET, DELAYED RELEASE ORAL at 05:41

## 2023-08-17 RX ADMIN — GABAPENTIN 100 MILLIGRAM(S): 400 CAPSULE ORAL at 23:01

## 2023-08-17 RX ADMIN — Medication 100 MILLIGRAM(S): at 18:04

## 2023-08-17 RX ADMIN — TAMSULOSIN HYDROCHLORIDE 0.4 MILLIGRAM(S): 0.4 CAPSULE ORAL at 23:01

## 2023-08-17 RX ADMIN — FINASTERIDE 5 MILLIGRAM(S): 5 TABLET, FILM COATED ORAL at 12:28

## 2023-08-17 RX ADMIN — MUPIROCIN 1 APPLICATION(S): 20 OINTMENT TOPICAL at 18:06

## 2023-08-17 RX ADMIN — CITALOPRAM 20 MILLIGRAM(S): 10 TABLET, FILM COATED ORAL at 12:28

## 2023-08-17 RX ADMIN — Medication 25 MILLIGRAM(S): at 13:25

## 2023-08-17 RX ADMIN — Medication 25 MILLIGRAM(S): at 18:04

## 2023-08-17 RX ADMIN — Medication 4 UNIT(S): at 09:05

## 2023-08-17 NOTE — DISCHARGE NOTE PROVIDER - HOSPITAL COURSE
2M with PMHx CAD s/p stents 2020, HTN, DM2, BPH, MDD presenting with left shoulder pain x2 days. Atraumatic left shoulder pain. No swelling, erythema, signs of trauma or drainage. Limited ROM 2/2 pain. Xray L shoulder: No acute fracture or dislocation. MRI shoulder shows high-grade tear of the supraspinatus. MRI cervial spine shows Cervical stenosis. Outpatient follow up as per Ortho and neurosurgery. POSITIVE UA Yeast ID follow up recommend no antifungals as pt is asymptomatic. Nephrology following for PHILIP, now improved. s/p I&D for Paronychia of left finger, s/p Doxy. Pt noted with bradycardia while sleeping, no need for cardiology consult per attending.  Endocrine following for A1C 10. D/c patient with 0.5mg Ozempic with instructions to start this dosage after he takes his 4th dose of 0.25mg (1 more week of this). Resume metformin 1000mg BID with meals on discharge eith close monitoring of gfr as an outpt. Resume Jardiance 25mg po daily      Discussed case with Dr. Evangelista, pt is cleared for discharge home. Walked well with PT on 8/23, no indication for inpatient rehab.

## 2023-08-17 NOTE — PROGRESS NOTE ADULT - ASSESSMENT
Patient is an 81 y/o M with a PMHx of T2DM (not on insulin), CAD s/p stents 2020, HTN, BPH, and MDD presenting with left shoulder pain x2 days, concerning for OA vs. adhesive capsulitis. Endocrinology consulted for management of Type 2 DM.    #Type 2 Diabetes Mellitus  A1c: 10.5% (prior A1c 9.9% in 3/2023)  Home Meds: metformin 1000mg BID, Jardiance 25mg qd, Ozempic 0.25mg qweekly  Does not monitor blood sugars at home  - No clinical signs or lab findings concerning for DKA  - c/w Lantus to 14u qhs  - c/w standing Admelog 4u TID before meals  - c/w Admelog sliding scale TID before meals and qhs  - c/w FS TID before meals and qhs  - Diet: consistent carb  - RD consult    Discharge Planning:  - Recommend increasing Ozempic to therapeutic dosage  - May need to adjust metformin dosage based on eGFR  - Consider discharging patient with basal insulin. Pt now agreeable to starting basal insulin regimen.  - Consider adding pioglitazone to regimen if patient remains uncontrolled and cannot take insulin  - Provide patient with glucometer, lancets, and test strips on discharge  - Pt can follow up with Endocrinology outpatient at [address] [place] [address], [address], [place] [address]. [phone].    #Hypertension  Home meds: amlodipine 10mg qd, losartan 50mg qd  - c/w losartan 100mg qd  - c/w metoprolol 25mg BID  - Management as per primary team    #CAD (coronary artery disease).   s/p CARMEN to pRCA and LCx in 2020  Home meds: aspirin 81mg qd, atorvastatin 40mg qhs  - c/w home medications  - Management as per primary team Patient is an 83 y/o M with a PMHx of T2DM (not on insulin), CAD s/p stents 2020, HTN, BPH, and MDD presenting with left shoulder pain x2 days, concerning for OA vs. adhesive capsulitis. Endocrinology consulted for management of Type 2 DM.    #Type 2 Diabetes Mellitus  A1c: 10.5% (prior A1c 9.9% in 3/2023)  Home Meds: metformin 1000mg BID, Jardiance 25mg qd, Ozempic 0.25mg qweekly  Does not monitor blood sugars at home  - No clinical signs or lab findings concerning for DKA  - c/w Lantus to 14u qhs  - c/w standing Admelog 4u TID before meals  - c/w Admelog sliding scale TID before meals and qhs  - c/w FS TID before meals and qhs  - Diet: consistent carb  - RD consult    Discharge Planning:  - Pt received 3x doses of 0.25mg Ozempic. Can d/c patient with 0.5mg Ozempic with instructions to start this dosage after he takes his 4th dose of 0.25mg.  - Trend eGFR to assess need for adjusting metformin dosage (if eGFR <45)  - Given patient's age and recent initiation of Ozempic, can hold off on discharging patient on basal insulin  - Provide patient with glucometer, lancets, and test strips on discharge  - Pt can follow up with Endocrinology outpatient at [address] [place] [address], [address], [place] [address]. [phone].    #Hypertension  Home meds: amlodipine 10mg qd, losartan 50mg qd  - c/w losartan 100mg qd  - c/w metoprolol 25mg BID  - Management as per primary team    #CAD (coronary artery disease).   s/p CARMEN to pRCA and LCx in 2020  Home meds: aspirin 81mg qd, atorvastatin 40mg qhs  - c/w home medications  - Management as per primary team Patient is an 81 y/o M with a PMHx of T2DM (not on insulin), CAD s/p stents 2020, HTN, BPH, and MDD presenting with left shoulder pain x2 days, concerning for OA vs. adhesive capsulitis. Endocrinology consulted for management of Type 2 DM.    #Type 2 Diabetes Mellitus  A1c: 10.5% (prior A1c 9.9% in 3/2023)  Home Meds: metformin 1000mg BID, Jardiance 25mg qd, Ozempic 0.25mg qweekly  Does not monitor blood sugars at home  - No clinical signs or lab findings concerning for DKA  - c/w Lantus to 14 units qhs  - c/w standing Admelog 4 units TID before meals  - c/w Admelog low correction scale TID before meals and low qhs scale  - c/w FS TID before meals and qhs  - Diet: consistent carb  - RD consult    Discharge Planning:  - Pt received 3x doses of 0.25mg Ozempic. Can d/c patient with 0.5mg Ozempic with instructions to start this dosage after he takes his 4th dose of 0.25mg.  - Trend eGFR to assess need for adjusting metformin dosage (if eGFR <45) - as of now plan is to resume metformin 1000mg BID  - Resume Jardiance 25mg po daily  - Given patient's age and recent initiation of Ozempic, can hold off on discharging patient on basal insulin  - Provide patient with glucometer, lancets, and test strips on discharge  - Pt can follow up with Endocrinology outpatient at [address] [place] [address], [address], [place] [address]. [phone].    #Hypertension  Home meds: amlodipine 10mg qd, losartan 50mg qd  - c/w losartan 100mg qd  - c/w metoprolol 25mg BID  - Management as per primary team    #CAD (coronary artery disease).   s/p CARMEN to pRCA and LCx in 2020  Home meds: aspirin 81mg qd, atorvastatin 40mg qhs  - c/w home medications  - Management as per primary team

## 2023-08-17 NOTE — PROGRESS NOTE ADULT - SUBJECTIVE AND OBJECTIVE BOX
NOTE INCOMPLETE/ IN PROGRESS  *Please wait for attending attestation for official recommendations.     Chief Complaint: left shoulder pain x2 days, abnl EKG    History:  Pt seen and examined at bedside. No acute overnight events. Patient continues to endorse left shoulder pain/weakness. He is amenable to starting a basal insulin regimen on discharge from the hospital.      MEDICATIONS  (STANDING):  amLODIPine   Tablet 10 milliGRAM(s) Oral daily  aspirin enteric coated 81 milliGRAM(s) Oral daily  atorvastatin 40 milliGRAM(s) Oral at bedtime  citalopram 20 milliGRAM(s) Oral daily  dextrose 5%. 1000 milliLiter(s) (50 mL/Hr) IV Continuous <Continuous>  dextrose 5%. 1000 milliLiter(s) (100 mL/Hr) IV Continuous <Continuous>  dextrose 50% Injectable 25 Gram(s) IV Push once  dextrose 50% Injectable 12.5 Gram(s) IV Push once  dextrose 50% Injectable 25 Gram(s) IV Push once  doxycycline monohydrate Capsule 100 milliGRAM(s) Oral every 12 hours  enoxaparin Injectable 40 milliGRAM(s) SubCutaneous every 24 hours  finasteride 5 milliGRAM(s) Oral daily  gabapentin 100 milliGRAM(s) Oral three times a day  glucagon  Injectable 1 milliGRAM(s) IntraMuscular once  hydrALAZINE 25 milliGRAM(s) Oral three times a day  insulin glargine Injectable (LANTUS) 14 Unit(s) SubCutaneous at bedtime  insulin lispro (ADMELOG) corrective regimen sliding scale   SubCutaneous three times a day before meals  insulin lispro (ADMELOG) corrective regimen sliding scale   SubCutaneous at bedtime  insulin lispro Injectable (ADMELOG) 4 Unit(s) SubCutaneous three times a day before meals  losartan 100 milliGRAM(s) Oral daily  metoprolol tartrate 25 milliGRAM(s) Oral two times a day  mupirocin 2% Ointment 1 Application(s) Topical two times a day  pantoprazole    Tablet 40 milliGRAM(s) Oral before breakfast  tamsulosin 0.4 milliGRAM(s) Oral at bedtime    MEDICATIONS  (PRN):  acetaminophen     Tablet .. 650 milliGRAM(s) Oral every 6 hours PRN Temp greater or equal to 38C (100.4F), Mild Pain (1 - 3)  dextrose Oral Gel 15 Gram(s) Oral once PRN Blood Glucose LESS THAN 70 milliGRAM(s)/deciliter  melatonin 3 milliGRAM(s) Oral at bedtime PRN Insomnia  traMADol 25 milliGRAM(s) Oral every 8 hours PRN Severe Pain (7 - 10)      Allergies  No Known Allergies      Review of Systems:  Constitutional: No fever  Eyes: No blurry vision  Neuro: No tremors  HEENT: No pain  Cardiovascular: No chest pain, palpitations  Respiratory: No SOB, no cough  GI: No nausea, vomiting, abdominal pain  : No dysuria  Skin: no rash  Psych: no depression  Endocrine: no polyuria, polydipsia  Hem/lymph: no swelling  Osteoporosis: no fractures  MSK: +L shoulder pain and limited ROM.  ALL OTHER SYSTEMS REVIEWED AND NEGATIVE      PHYSICAL EXAM:  VITALS: T(C): 36.4 (08-17-23 @ 08:38)  T(F): 97.6 (08-17-23 @ 08:38), Max: 98.2 (08-17-23 @ 01:00)  HR: 94 (08-17-23 @ 08:38) (71 - 94)  BP: 113/66 (08-17-23 @ 08:40) (99/80 - 140/79)  RR:  (16 - 18)  SpO2:  (97% - 100%)  Wt(kg): --    GENERAL: NAD, well-groomed, well-developed  EYES: No proptosis, no lid lag, anicteric  HEENT:  Atraumatic, Normocephalic, moist mucous membranes  THYROID: Normal size, no palpable nodules  RESPIRATORY: Clear to auscultation bilaterally; No rales, rhonchi, wheezing  CARDIOVASCULAR: Regular rate and rhythm; No murmurs; no peripheral edema  GI: Soft, nontender, non distended, normal bowel sounds  SKIN: +LUE 3rd digit paronychia s/p I&D. +Tenderness, erythema, swelling  MUSCULOSKELETAL: +Limited ROM in L shoulder. Full ROM in all other extremities.  NEURO: Sensation to light touch intact, extraocular movements intact, no tremor  PSYCH: Alert and oriented x 3, normal affect, normal mood  CUSHING'S SIGNS: no striae      CAPILLARY BLOOD GLUCOSE  POCT Blood Glucose.: 149 mg/dL (17 Aug 2023 08:45)  POCT Blood Glucose.: 197 mg/dL (16 Aug 2023 22:31)  POCT Blood Glucose.: 210 mg/dL (16 Aug 2023 16:46)  POCT Blood Glucose.: 209 mg/dL (16 Aug 2023 12:24)      Labs:  08-16  137  |  104  |  26<H>  ----------------------------<  258<H>  3.5   |  23  |  1.33<H>    eGFR: 54<L>    Ca    8.7      08-16  Mg     2.10     08-16  Phos  2.5     08-16    TPro  6.7  /  Alb  3.9  /  TBili  0.6  /  DBili  x   /  AST  15  /  ALT  16  /  AlkPhos  118  08-14    A1C with Estimated Average Glucose Result: 10.5 % (08-15-23 @ 02:00)  A1C with Estimated Average Glucose Result: 9.9 % (03-16-23 @ 15:24)    Thyroid Function Tests:  08-15 @ 02:00 TSH 2.72 FreeT4 -- T3 -- Anti TPO -- Anti Thyroglobulin Ab -- TSI --   Chief Complaint: left shoulder pain x2 days, abnl EKG    History:  Pt seen and examined at bedside. No acute overnight events. Patient continues to endorse left shoulder pain/weakness. He is amenable to starting a basal insulin regimen on discharge from the hospital.      MEDICATIONS  (STANDING):  amLODIPine   Tablet 10 milliGRAM(s) Oral daily  aspirin enteric coated 81 milliGRAM(s) Oral daily  atorvastatin 40 milliGRAM(s) Oral at bedtime  citalopram 20 milliGRAM(s) Oral daily  dextrose 5%. 1000 milliLiter(s) (50 mL/Hr) IV Continuous <Continuous>  dextrose 5%. 1000 milliLiter(s) (100 mL/Hr) IV Continuous <Continuous>  dextrose 50% Injectable 25 Gram(s) IV Push once  dextrose 50% Injectable 12.5 Gram(s) IV Push once  dextrose 50% Injectable 25 Gram(s) IV Push once  doxycycline monohydrate Capsule 100 milliGRAM(s) Oral every 12 hours  enoxaparin Injectable 40 milliGRAM(s) SubCutaneous every 24 hours  finasteride 5 milliGRAM(s) Oral daily  gabapentin 100 milliGRAM(s) Oral three times a day  glucagon  Injectable 1 milliGRAM(s) IntraMuscular once  hydrALAZINE 25 milliGRAM(s) Oral three times a day  insulin glargine Injectable (LANTUS) 14 Unit(s) SubCutaneous at bedtime  insulin lispro (ADMELOG) corrective regimen sliding scale   SubCutaneous three times a day before meals  insulin lispro (ADMELOG) corrective regimen sliding scale   SubCutaneous at bedtime  insulin lispro Injectable (ADMELOG) 4 Unit(s) SubCutaneous three times a day before meals  losartan 100 milliGRAM(s) Oral daily  metoprolol tartrate 25 milliGRAM(s) Oral two times a day  mupirocin 2% Ointment 1 Application(s) Topical two times a day  pantoprazole    Tablet 40 milliGRAM(s) Oral before breakfast  tamsulosin 0.4 milliGRAM(s) Oral at bedtime    MEDICATIONS  (PRN):  acetaminophen     Tablet .. 650 milliGRAM(s) Oral every 6 hours PRN Temp greater or equal to 38C (100.4F), Mild Pain (1 - 3)  dextrose Oral Gel 15 Gram(s) Oral once PRN Blood Glucose LESS THAN 70 milliGRAM(s)/deciliter  melatonin 3 milliGRAM(s) Oral at bedtime PRN Insomnia  traMADol 25 milliGRAM(s) Oral every 8 hours PRN Severe Pain (7 - 10)      Allergies  No Known Allergies      Review of Systems:  Constitutional: No fever  Eyes: No blurry vision  Neuro: No tremors  HEENT: No pain  Cardiovascular: No chest pain, palpitations  Respiratory: No SOB, no cough  GI: No nausea, vomiting, abdominal pain  : No dysuria  Skin: no rash  Psych: no depression  Endocrine: no polyuria, polydipsia  Hem/lymph: no swelling  Osteoporosis: no fractures  MSK: +L shoulder pain and limited ROM.  ALL OTHER SYSTEMS REVIEWED AND NEGATIVE      PHYSICAL EXAM:  VITALS: T(C): 36.4 (08-17-23 @ 08:38)  T(F): 97.6 (08-17-23 @ 08:38), Max: 98.2 (08-17-23 @ 01:00)  HR: 94 (08-17-23 @ 08:38) (71 - 94)  BP: 113/66 (08-17-23 @ 08:40) (99/80 - 140/79)  RR:  (16 - 18)  SpO2:  (97% - 100%)  Wt(kg): --    GENERAL: NAD, well-groomed, well-developed  EYES: No proptosis, no lid lag, anicteric  HEENT:  Atraumatic, Normocephalic, moist mucous membranes  THYROID: Normal size, no palpable nodules  RESPIRATORY: Clear to auscultation bilaterally; No rales, rhonchi, wheezing  CARDIOVASCULAR: Regular rate and rhythm; No murmurs; no peripheral edema  GI: Soft, nontender, non distended, normal bowel sounds  SKIN: +LUE 3rd digit paronychia s/p I&D. +Tenderness, erythema, swelling  MUSCULOSKELETAL: +Limited ROM in L shoulder. Full ROM in all other extremities.  NEURO: Sensation to light touch intact, extraocular movements intact, no tremor  PSYCH: Alert and oriented x 3, normal affect, normal mood  CUSHING'S SIGNS: no striae      CAPILLARY BLOOD GLUCOSE  POCT Blood Glucose.: 149 mg/dL (17 Aug 2023 08:45)  POCT Blood Glucose.: 197 mg/dL (16 Aug 2023 22:31)  POCT Blood Glucose.: 210 mg/dL (16 Aug 2023 16:46)  POCT Blood Glucose.: 209 mg/dL (16 Aug 2023 12:24)      Labs:  08-16  137  |  104  |  26<H>  ----------------------------<  258<H>  3.5   |  23  |  1.33<H>    eGFR: 54<L>    Ca    8.7      08-16  Mg     2.10     08-16  Phos  2.5     08-16    TPro  6.7  /  Alb  3.9  /  TBili  0.6  /  DBili  x   /  AST  15  /  ALT  16  /  AlkPhos  118  08-14    A1C with Estimated Average Glucose Result: 10.5 % (08-15-23 @ 02:00)  A1C with Estimated Average Glucose Result: 9.9 % (03-16-23 @ 15:24)    Thyroid Function Tests:  08-15 @ 02:00 TSH 2.72 FreeT4 -- T3 -- Anti TPO -- Anti Thyroglobulin Ab -- TSI --

## 2023-08-17 NOTE — DISCHARGE NOTE PROVIDER - NSDCFUSCHEDAPPT_GEN_ALL_CORE_FT
[facility]  CARDIOLOGY [address]   Scheduled Appointment: 11/15/2023

## 2023-08-17 NOTE — DISCHARGE NOTE PROVIDER - PROVIDER TOKENS
PROVIDER:[TOKEN:[[id number]]],PROVIDER:[TOKEN:[[id number]]],PROVIDER:[TOKEN:[[id number]]]

## 2023-08-17 NOTE — CONSULT NOTE ADULT - ASSESSMENT
82M with CAD s/p stents 2020, HTN, DM2, BPH, MDD presenting with left shoulder pain x2 days. Patient notes lateral shoulder pain that is worse with sitting up and better with lying down. It is limiting his ability to raise his arms above his shoulder level.    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 possible shoulder related   2) Stroke likely small vessel, R thalalmic   3) unsteady gait from cerebellar atrophy     - MRI C spine and L shoulder pending   - check CTH  - c/w asa and statin therapy with LDL goal < 70mg/dL for secondary stroke prevention.   - PT/OT   - no obojection to gabapenting 100mg TID    - Hemoglobin A1c and lipid panel  - check FS, glucose control <180  - GI/DVT ppx  - Counseling on diet, exercise, and medication adherence was done  - Counseling on smoking cessation and alcohol consumption offered when appropriate.  - Pain assessed and judicious use of narcotics when appropriate was discussed.    - Stroke education given when appropriate.  - Importance of fall prevention discussed.   - Differential diagnosis and plan of care discussed with patient and/or family and primary team  - Thank you for allowing me to participate in the care of this patient. Call with questions.   Kwaku Nguyen MD  Vascular Neurology  Office: [phone]

## 2023-08-17 NOTE — PROGRESS NOTE ADULT - SUBJECTIVE AND OBJECTIVE BOX
WILEY BLAKE  82y  Male      Patient is a 82y old  Male who presents with a chief complaint of left shoulder pain x2 days, abnl EKG (17 Aug 2023 10:34)  Patient was seen and examined.chart reviewed,c/o lt.shoulder area pain.no sob,no cp,no fever,no cough    REVIEW OF SYSTEMS:  CONSTITUTIONAL: No fever  RESPIRATORY: No cough, hemoptysis or shortness of breath  CARDIOVASCULAR: No chest pain, palpitations, dizziness, or leg swelling  GASTROINTESTINAL: No abdominal pain. nausea, vomiting, hematemesis  GENITOURINARY: No dysuria, frequency, hematuria   NEUROLOGICAL: No headaches, no dizziness  MUSCULOSKELETAL: No joint pain or swelling;     INTERVAL HPI/OVERNIGHT EVENTS:  T(C): 37 (08-17-23 @ 18:00), Max: 37 (08-17-23 @ 18:00)  HR: 84 (08-17-23 @ 18:00) (71 - 94)  BP: 140/73 (08-17-23 @ 18:00) (99/80 - 140/73)  RR: 18 (08-17-23 @ 18:00) (16 - 18)  SpO2: 100% (08-17-23 @ 18:00) (97% - 100%)  Wt(kg): --  I&O's Summary    T(C): 37 (08-17-23 @ 18:00), Max: 37 (08-17-23 @ 18:00)  HR: 84 (08-17-23 @ 18:00) (71 - 94)  BP: 140/73 (08-17-23 @ 18:00) (99/80 - 140/73)  RR: 18 (08-17-23 @ 18:00) (16 - 18)  SpO2: 100% (08-17-23 @ 18:00) (97% - 100%)  Wt(kg): --Vital Signs Last 24 Hrs  T(C): 37 (17 Aug 2023 18:00), Max: 37 (17 Aug 2023 18:00)  T(F): 98.6 (17 Aug 2023 18:00), Max: 98.6 (17 Aug 2023 18:00)  HR: 84 (17 Aug 2023 18:00) (71 - 94)  BP: 140/73 (17 Aug 2023 18:00) (99/80 - 140/73)  BP(mean): --  RR: 18 (17 Aug 2023 18:00) (16 - 18)  SpO2: 100% (17 Aug 2023 18:00) (97% - 100%)    Parameters below as of 17 Aug 2023 18:00  Patient On (Oxygen Delivery Method): room air        LABS:                        14.9   9.17  )-----------( 248      ( 16 Aug 2023 06:30 )             40.9     08-16    137  |  104  |  26<H>  ----------------------------<  258<H>  3.5   |  23  |  1.33<H>    Ca    8.7      16 Aug 2023 06:30  Phos  2.5     08-16  Mg     2.10     08-16        Urinalysis Basic - ( 16 Aug 2023 06:30 )    Color: x / Appearance: x / SG: x / pH: x  Gluc: 258 mg/dL / Ketone: x  / Bili: x / Urobili: x   Blood: x / Protein: x / Nitrite: x   Leuk Esterase: x / RBC: x / WBC x   Sq Epi: x / Non Sq Epi: x / Bacteria: x      CAPILLARY BLOOD GLUCOSE      POCT Blood Glucose.: 220 mg/dL (17 Aug 2023 17:59)  POCT Blood Glucose.: 150 mg/dL (17 Aug 2023 12:39)  POCT Blood Glucose.: 149 mg/dL (17 Aug 2023 08:45)  POCT Blood Glucose.: 197 mg/dL (16 Aug 2023 22:31)        Urinalysis Basic - ( 16 Aug 2023 06:30 )    Color: x / Appearance: x / SG: x / pH: x  Gluc: 258 mg/dL / Ketone: x  / Bili: x / Urobili: x   Blood: x / Protein: x / Nitrite: x   Leuk Esterase: x / RBC: x / WBC x   Sq Epi: x / Non Sq Epi: x / Bacteria: x        PAST MEDICAL & SURGICAL HISTORY:  DM (diabetes mellitus)      HTN (hypertension)      Depression      Asthma      CAD (coronary artery disease)      S/P coronary artery stent placement          MEDICATIONS  (STANDING):  amLODIPine   Tablet 10 milliGRAM(s) Oral daily  aspirin enteric coated 81 milliGRAM(s) Oral daily  atorvastatin 40 milliGRAM(s) Oral at bedtime  citalopram 20 milliGRAM(s) Oral daily  dextrose 5%. 1000 milliLiter(s) (50 mL/Hr) IV Continuous <Continuous>  dextrose 5%. 1000 milliLiter(s) (100 mL/Hr) IV Continuous <Continuous>  dextrose 50% Injectable 25 Gram(s) IV Push once  dextrose 50% Injectable 12.5 Gram(s) IV Push once  dextrose 50% Injectable 25 Gram(s) IV Push once  doxycycline monohydrate Capsule 100 milliGRAM(s) Oral every 12 hours  enoxaparin Injectable 40 milliGRAM(s) SubCutaneous every 24 hours  finasteride 5 milliGRAM(s) Oral daily  gabapentin 100 milliGRAM(s) Oral three times a day  glucagon  Injectable 1 milliGRAM(s) IntraMuscular once  hydrALAZINE 25 milliGRAM(s) Oral three times a day  insulin glargine Injectable (LANTUS) 14 Unit(s) SubCutaneous at bedtime  insulin lispro (ADMELOG) corrective regimen sliding scale   SubCutaneous three times a day before meals  insulin lispro (ADMELOG) corrective regimen sliding scale   SubCutaneous at bedtime  insulin lispro Injectable (ADMELOG) 4 Unit(s) SubCutaneous three times a day before meals  losartan 100 milliGRAM(s) Oral daily  metoprolol tartrate 25 milliGRAM(s) Oral two times a day  mupirocin 2% Ointment 1 Application(s) Topical two times a day  pantoprazole    Tablet 40 milliGRAM(s) Oral before breakfast  tamsulosin 0.4 milliGRAM(s) Oral at bedtime    MEDICATIONS  (PRN):  acetaminophen     Tablet .. 650 milliGRAM(s) Oral every 6 hours PRN Temp greater or equal to 38C (100.4F), Mild Pain (1 - 3)  dextrose Oral Gel 15 Gram(s) Oral once PRN Blood Glucose LESS THAN 70 milliGRAM(s)/deciliter  melatonin 3 milliGRAM(s) Oral at bedtime PRN Insomnia  traMADol 25 milliGRAM(s) Oral every 8 hours PRN Severe Pain (7 - 10)        RADIOLOGY & ADDITIONAL TESTS:    Imaging Personally Reviewed:  [ ] YES  [ ] NO    Consultant(s) Notes Reviewed:  [ ] YES  [ ] NO    PHYSICAL EXAM:  GENERAL: Alert and awake lying in bed in no distress  HEAD:  Atraumatic, Normocephalic  EYES: EOMI, JOSE, conjunctiva and sclera clear  NECK: Supple, No JVD, Normal thyroid  NERVOUS SYSTEM:  Alert & Oriented X3, Motor and sensory systems are intact, lt.arm weakness  CHEST/LUNG: Bilateral clear breath sounds, no rhochi, no wheezing, no crepitations,  HEART: Regular rate and rhythm; No murmurs, rubs, or gallops  ABDOMEN: Soft, Nontender, Nondistended; Bowel sounds present  EXTREMITIES:   Peripheral Pulses are palpable, no  edema        Care Discussed with Consultants/Other Providers [ ] YES  [ ] NO      Code Status: [] Full Code [] DNR [] DNI [] Goals of Care:   Disposition: [] ICU [] Stroke Unit [] RCU []PCU []Floor [] Discharge Home         GEORGINA [email or web address]

## 2023-08-17 NOTE — DISCHARGE NOTE PROVIDER - CARE PROVIDER_API CALL
Cameron Tse  Nephrology  [address], [address] [place] [address]  Phone: [phone]  Fax: [phone]  Follow Up Time:     Roly Mendieta  Neurosurgery  [address] [place] [address]  Phone: [phone]  Fax: [phone]  Follow Up Time:     Frank Peña  Orthopaedic Surgery  [address] [facility] [address]  [place] [address]  Phone: [phone]  Fax: [phone]  Follow Up Time:

## 2023-08-17 NOTE — DISCHARGE NOTE PROVIDER - NSDCMRMEDTOKEN_GEN_ALL_CORE_FT
albuterol 90 mcg/inh inhalation aerosol: 2 puff(s) inhaled every 6 hours as needed for  shortness of breath and/or wheezing  amLODIPine 10 mg oral tablet: 1 tab(s) orally once a day (per pharmacy 90-day supply last dispensed 2/23/23)  aspirin 81 mg oral delayed release tablet: 1 tab(s) orally once a day  atorvastatin 40 mg oral tablet: 1 tab(s) orally once a day  citalopram 20 mg oral tablet: 1 tab(s) orally once a day (per pharmacy 30-day supply last dispensed 2/23/23)  finasteride 5 mg oral tablet: 1 tab(s) orally once a day (per pharmacy 90-day supply last dispensed 2/23/23)  indapamide 1.25 mg oral tablet: 1 tab(s) orally once a day  Jardiance 25 mg oral tablet: 1 tab(s) orally once a day (per pharmacy 30-day supply last dispensed 2/23/23)  losartan 50 mg oral tablet: 1 tab(s) orally once a day (per pharmacy 90-day supply last dispensed 2/23/23)  metFORMIN 500 mg oral tablet, extended release: 2 tab(s) orally 2 times a day (per pharmacy 90-day supply last dispensed 2/23/23)  omeprazole 20 mg oral delayed release capsule: 1 cap(s) orally once a day  Ozempic 2 mg/3 mL (0.25 mg or 0.5 mg dose) subcutaneous solution: 0.25 milligram(s) subcutaneously once a week on Monday  tamsulosin 0.4 mg oral capsule: 1 cap(s) orally once a day (per pharmacy 90-day supply last dispensed 2/23/23)   albuterol 90 mcg/inh inhalation aerosol: 2 puff(s) inhaled every 6 hours as needed for  shortness of breath and/or wheezing  amLODIPine 10 mg oral tablet: 1 tab(s) orally once a day (per pharmacy 90-day supply last dispensed 2/23/23)  aspirin 81 mg oral delayed release tablet: 1 tab(s) orally once a day  atorvastatin 40 mg oral tablet: 1 tab(s) orally once a day  citalopram 20 mg oral tablet: 1 tab(s) orally once a day (per pharmacy 30-day supply last dispensed 2/23/23)  finasteride 5 mg oral tablet: 1 tab(s) orally once a day (per pharmacy 90-day supply last dispensed 2/23/23)  gabapentin 100 mg oral capsule: 1 cap(s) orally 3 times a day  hydrALAZINE 25 mg oral tablet: 1 tab(s) orally 3 times a day  Jardiance 25 mg oral tablet: 1 tab(s) orally once a day (per pharmacy 30-day supply last dispensed 2/23/23)  losartan 50 mg oral tablet: 1 tab(s) orally once a day (per pharmacy 90-day supply last dispensed 2/23/23)  metFORMIN 500 mg oral tablet, extended release: 2 tab(s) orally 2 times a day (per pharmacy 90-day supply last dispensed 2/23/23)  metoprolol tartrate 25 mg oral tablet: 0.5 tab(s) orally 2 times a day  omeprazole 20 mg oral delayed release capsule: 1 cap(s) orally once a day  Ozempic 2 mg/3 mL (0.25 mg or 0.5 mg dose) subcutaneous solution: 0.5 milligram(s) subcutaneously once a week on Monday  tamsulosin 0.4 mg oral capsule: 1 cap(s) orally once a day (per pharmacy 90-day supply last dispensed 2/23/23)

## 2023-08-17 NOTE — DISCHARGE NOTE PROVIDER - CARE PROVIDERS DIRECT ADDRESSES
,DirectAddress_Unknown,DirectAddress_Unknown,[email or web address]

## 2023-08-17 NOTE — DISCHARGE NOTE PROVIDER - NSDCFUADDAPPT_GEN_ALL_CORE_FT
Follow up with your primary care physician for further monitoring in 1-2 weeks. Please call to arrange appointment.     For rotator cuff tear: outpatient follow up with Orthopedics Dr. Peña, Dr. Helm, or Dr. Lainez. Please call [phone] to make an appointment.    For Diabetes:  follow up with Endocrinology outpatient at [address] [place] [address], [address] [address]. [phone].    Follow up with Neurosurgeon Dr. Mendieta for management of cervical stenosis  Follow up with your primary care physician for further monitoring in 1-2 weeks. Please call to arrange appointment.     For rotator cuff tear: outpatient follow up with Orthopedics Dr. Peña, Dr. Helm, or Dr. Lainez. Please call [phone] to make an appointment.    For Diabetes:  follow up with Endocrinology outpatient at [address] [place] [address], [address] [address]. [phone].      Follow up with Neurosurgeon Dr. Mendieta for management of cervical stenosis

## 2023-08-17 NOTE — DISCHARGE NOTE PROVIDER - NSDCCPCAREPLAN_GEN_ALL_CORE_FT
PRINCIPAL DISCHARGE DIAGNOSIS  Diagnosis: Left shoulder pain  Assessment and Plan of Treatment: MRI shows Left rotator cuff tear, follow up with Ortho on discharge.      SECONDARY DISCHARGE DIAGNOSES  Diagnosis: Paronychia, finger, left  Assessment and Plan of Treatment: Pus was drained in ER, you completed a course of antibiotics.    Diagnosis: Stenosis, cervical spine  Assessment and Plan of Treatment: Follow up with neurosurgery as outpatient.    Diagnosis: CAD (coronary artery disease)  Assessment and Plan of Treatment: Continue aspirin and follow up with your cardiologist as outpatient for follow up.    Diagnosis: Type 2 diabetes mellitus with hyperglycemia, without long-term current use of insulin  Assessment and Plan of Treatment: Take 0.5mg Ozempic, however start this dosage after you take 4th dose of 0.25mg (1 more week of this)  - Resume metformin 1000mg BID with meals on discharge eith close monitoring of kidney function as an outpt   - Resume Jardiance 25mg daily  Please call your doctor if your Finger stick is 70 or below and or 250 and above   Please check you fingersticks every morning, or if you are not feeling well and before meals.  If your fingerstick is >300 x 2 or more readings. Please contact primary doctor or endocrinologist.  If your fingerstick is less than 70 and/or you have symptoms of very low blood sugar, FIRST drink 1/2 cup of apple juice (or take 4 glucose tabs) and recheck fingerstick in 15 minutes. Repeat these steps until blood sugar is above 100, if necessary then call your doctor to discuss low blood sugar.   Please call your doctor if your FS is 70 or below and or 250 and above

## 2023-08-17 NOTE — CONSULT NOTE ADULT - SUBJECTIVE AND OBJECTIVE BOX
Neurology Consult    Reason for Consult: Patient is a 82y old  Male who presents with a chief complaint of left shoulder pain x2 days, abnl EKG (17 Aug 2023 08:30)      HPI:  82M with PMHx CAD s/p stents 2020, HTN, DM2, BPH, MDD presenting with left shoulder pain x2 days. Patient notes lateral shoulder pain that is worse with sitting up and better with lying down. It is limiting his ability to raise his arms above his shoulder level. He denies recent trauma, cuts, procedures to this joint. There is no swelling, erythema as well and denies arthritis in this joint as well. He went to see his PMD today and out of concern for an abnormal EKG he was sent to the ED for evaluation. He denies having any chest pain (despite ED provider note mentioning this). He denies fevers, chills, cough, SOB, palpitations, LH, LOC, abdominal pain, vomiting, diarrhea, LE swelling. Patient does not know all his medications. In 2020 he had CARMEN x2 placed to LCx and pRCA. At the time he was placed on DAPT. He is unsure if he is still on plavix but notes taking baby ASA every day. On surescripts plavix is not listed. He denies exertional CP or COOPER at this time. He believes he is on amlodipine, ASA, atorvastatin, citalopram, metformin when mentioned to him but he is unsure    Of note in ED L 3rd digit paronychia found and s/p I&D (14 Aug 2023 22:52)       PAST MEDICAL & SURGICAL HISTORY:  DM (diabetes mellitus)      HTN (hypertension)      Depression      Asthma      CAD (coronary artery disease)      S/P coronary artery stent placement          Allergies: Allergies    No Known Allergies    Intolerances        Social History: Denies toxic habits including tobacco, ETOH or illicit drugs.    Family History: FAMILY HISTORY:  No pertinent family history in first degree relatives    . No family history of strokes    Medications: MEDICATIONS  (STANDING):  amLODIPine   Tablet 10 milliGRAM(s) Oral daily  aspirin enteric coated 81 milliGRAM(s) Oral daily  atorvastatin 40 milliGRAM(s) Oral at bedtime  citalopram 20 milliGRAM(s) Oral daily  dextrose 5%. 1000 milliLiter(s) (50 mL/Hr) IV Continuous <Continuous>  dextrose 5%. 1000 milliLiter(s) (100 mL/Hr) IV Continuous <Continuous>  dextrose 50% Injectable 25 Gram(s) IV Push once  dextrose 50% Injectable 12.5 Gram(s) IV Push once  dextrose 50% Injectable 25 Gram(s) IV Push once  doxycycline monohydrate Capsule 100 milliGRAM(s) Oral every 12 hours  enoxaparin Injectable 40 milliGRAM(s) SubCutaneous every 24 hours  finasteride 5 milliGRAM(s) Oral daily  gabapentin 100 milliGRAM(s) Oral three times a day  glucagon  Injectable 1 milliGRAM(s) IntraMuscular once  hydrALAZINE 25 milliGRAM(s) Oral three times a day  insulin glargine Injectable (LANTUS) 14 Unit(s) SubCutaneous at bedtime  insulin lispro (ADMELOG) corrective regimen sliding scale   SubCutaneous three times a day before meals  insulin lispro (ADMELOG) corrective regimen sliding scale   SubCutaneous at bedtime  insulin lispro Injectable (ADMELOG) 4 Unit(s) SubCutaneous three times a day before meals  losartan 100 milliGRAM(s) Oral daily  metoprolol tartrate 25 milliGRAM(s) Oral two times a day  mupirocin 2% Ointment 1 Application(s) Topical two times a day  pantoprazole    Tablet 40 milliGRAM(s) Oral before breakfast  tamsulosin 0.4 milliGRAM(s) Oral at bedtime    MEDICATIONS  (PRN):  acetaminophen     Tablet .. 650 milliGRAM(s) Oral every 6 hours PRN Temp greater or equal to 38C (100.4F), Mild Pain (1 - 3)  dextrose Oral Gel 15 Gram(s) Oral once PRN Blood Glucose LESS THAN 70 milliGRAM(s)/deciliter  melatonin 3 milliGRAM(s) Oral at bedtime PRN Insomnia  traMADol 25 milliGRAM(s) Oral every 8 hours PRN Severe Pain (7 - 10)      Review of Systems:  CONSTITUTIONAL:  No weight loss, fever, chills, weakness or fatigue.  HEENT:  Eyes:  No visual loss, blurred vision, double vision or yellow sclera. Ears, Nose, Throat:  No hearing loss, sneezing, congestion, runny nose or sore throat.  SKIN:  No rash or itching.  CARDIOVASCULAR:  No chest pain, chest pressure or chest discomfort. No palpitations or edema.  RESPIRATORY:  No shortness of breath, cough or sputum.  GASTROINTESTINAL:  No anorexia, nausea, vomiting or diarrhea. No abdominal pain or blood.  GENITOURINARY:  No burning on urination or incontinence   NEUROLOGICAL:  No headache, dizziness, syncope, paralysis, ataxia, numbness or tingling in the extremities. No change in bowel or bladder control. no limb weakness. no vision changes.   MUSCULOSKELETAL:  shoulder pain   HEMATOLOGIC:  No anemia, bleeding or bruising.  LYMPHATICS:  No enlarged nodes. No history of splenectomy.  PSYCHIATRIC:  No history of depression or anxiety.  ENDOCRINOLOGIC:  No reports of sweating, cold or heat intolerance. No polyuria or polydipsia.      Vitals:  Vital Signs Last 24 Hrs  T(C): 36.4 (17 Aug 2023 08:38), Max: 36.8 (17 Aug 2023 01:00)  T(F): 97.6 (17 Aug 2023 08:38), Max: 98.2 (17 Aug 2023 01:00)  HR: 94 (17 Aug 2023 08:38) (71 - 94)  BP: 113/66 (17 Aug 2023 08:40) (99/80 - 140/79)  BP(mean): --  RR: 17 (17 Aug 2023 08:38) (16 - 18)  SpO2: 97% (17 Aug 2023 08:38) (97% - 100%)    Parameters below as of 17 Aug 2023 08:38  Patient On (Oxygen Delivery Method): room air        General Exam:   General Appearance: Appropriately dressed and in no acute distress       Head: Normocephalic, atraumatic and no dysmorphic features  Ear, Nose, and Throat: Moist mucous membranes  CVS: S1S2+  Resp: No SOB, no wheeze or rhonchi  GI: soft NT/ND  Extremities: No edema or cyanosis  Skin:  L 3rd digit paronychia     Neurological Exam:  Mental Status: Awake, alert and oriented x 2-3.  Able to follow simple and complex verbal commands. Able to name and repeat. fluent speech. No obvious aphasia or dysarthria noted.   Cranial Nerves: PERRL, EOMI, VFFC, sensation V1-V3 intact,  no obvious facial asymmetry, equal elevation of palate, scm/trap 5/5, tongue is midline on protrusion. no obvious papilledema on fundoscopic exam. hearing is grossly intact.   Motor: Normal bulk, tone and strength throughout except LUE pain limited 3/5 weakness  Sensation: Intact to light touch and pinprick throughout. no right/left confusion. no extinction to tactile on DSS.   Reflexes: 1+ throughout at biceps, brachioradialis, triceps, patellars and ankles bilaterally and equal. No clonus. R toe and L toe were both downgoing.  Coordination: No dysmetria on FNF or HKS  Gait: cane jhonatanlien     Data/Labs/Imaging which I personally reviewed.     Labs:     CBC Full  -  ( 16 Aug 2023 06:30 )  WBC Count : 9.17 K/uL  RBC Count : 4.86 M/uL  Hemoglobin : 14.9 g/dL  Hematocrit : 40.9 %  Platelet Count - Automated : 248 K/uL  Mean Cell Volume : 84.2 fL  Mean Cell Hemoglobin : 30.7 pg  Mean Cell Hemoglobin Concentration : 36.4 gm/dL  Auto Neutrophil # : 6.44 K/uL  Auto Lymphocyte # : 2.22 K/uL  Auto Monocyte # : 0.42 K/uL  Auto Eosinophil # : 0.03 K/uL  Auto Basophil # : 0.03 K/uL  Auto Neutrophil % : 70.3 %  Auto Lymphocyte % : 24.2 %  Auto Monocyte % : 4.6 %  Auto Eosinophil % : 0.3 %  Auto Basophil % : 0.3 %    08-16    137  |  104  |  26<H>  ----------------------------<  258<H>  3.5   |  23  |  1.33<H>    Ca    8.7      16 Aug 2023 06:30  Phos  2.5     08-16  Mg     2.10     08-16          Urinalysis Basic - ( 16 Aug 2023 06:30 )    Color: x / Appearance: x / SG: x / pH: x  Gluc: 258 mg/dL / Ketone: x  / Bili: x / Urobili: x   Blood: x / Protein: x / Nitrite: x   Leuk Esterase: x / RBC: x / WBC x   Sq Epi: x / Non Sq Epi: x / Bacteria: x          < from: CT Head No Cont (03.16.23 @ 16:48) >    ACC: [id number] EXAM:  CT BRAIN   ORDERED BY: BK PAREKH     PROCEDURE DATE:  03/16/2023          INTERPRETATION:  CT head without IV contrast    CLINICAL INFORMATION: Dizziness    TECHNIQUE: Contiguous axial 5 mm sections were obtained through the head.   Sagittal and coronal 2-D reformatted images were also obtained.   This   scan was performed using automatic exposure control (radiation dose   reduction software) to obtain a diagnostic image quality scan with   patient dose as low as reasonably achievable.    FINDINGS:   No previous examinations are available for review.    The brain demonstrates mild periventricular and scattered bifrontal and   biparietal subcortical white matter ischemia. Old lacunar infarction is   seen within the lateral RIGHT thalamus. No acute cerebral cortical   infarct is seen.  No intracranial hemorrhage is found.  No mass effect is   found in the brain.    The ventricles, sulci and basal cisterns demonstrates mild cerebellar   atrophy.    The orbits are unremarkable.  The paranasal sinuses are clear.  The nasal   cavity appears intact.  The nasopharynx is symmetric.  The central skull   base, petrous temporal bones and calvarium remain intact.      IMPRESSION: Mild periventricular and scattered bifrontaland biparietal   subcortical white matter ischemia. Old lacunar infarction is seen within   the lateral RIGHT thalamus. Mild cerebellar atrophy is noted.    --- End of Report ---            BRYANT FRANCISCO MD; Attending Radiologist  This document has been electronically signed. Mar 16 2023  4:55PM    < end of copied text >

## 2023-08-18 ENCOUNTER — TRANSCRIPTION ENCOUNTER (OUTPATIENT)
Age: 83
End: 2023-08-18

## 2023-08-18 DIAGNOSIS — N17.9 ACUTE KIDNEY FAILURE, UNSPECIFIED: ICD-10-CM

## 2023-08-18 LAB
ANION GAP SERPL CALC-SCNC: 12 MMOL/L — SIGNIFICANT CHANGE UP (ref 7–14)
APPEARANCE UR: ABNORMAL
BACTERIA # UR AUTO: NEGATIVE /HPF — SIGNIFICANT CHANGE UP
BILIRUB UR-MCNC: NEGATIVE — SIGNIFICANT CHANGE UP
BUN SERPL-MCNC: 44 MG/DL — HIGH (ref 7–23)
CALCIUM SERPL-MCNC: 9 MG/DL — SIGNIFICANT CHANGE UP (ref 8.4–10.5)
CAST: 5 /LPF — HIGH (ref 0–4)
CHLORIDE SERPL-SCNC: 103 MMOL/L — SIGNIFICANT CHANGE UP (ref 98–107)
CO2 SERPL-SCNC: 21 MMOL/L — LOW (ref 22–31)
COLOR SPEC: YELLOW — SIGNIFICANT CHANGE UP
CREAT ?TM UR-MCNC: 192 MG/DL — SIGNIFICANT CHANGE UP
CREAT SERPL-MCNC: 1.82 MG/DL — HIGH (ref 0.5–1.3)
DIFF PNL FLD: NEGATIVE — SIGNIFICANT CHANGE UP
EGFR: 37 ML/MIN/1.73M2 — LOW
GLUCOSE BLDC GLUCOMTR-MCNC: 168 MG/DL — HIGH (ref 70–99)
GLUCOSE BLDC GLUCOMTR-MCNC: 204 MG/DL — HIGH (ref 70–99)
GLUCOSE BLDC GLUCOMTR-MCNC: 305 MG/DL — HIGH (ref 70–99)
GLUCOSE SERPL-MCNC: 188 MG/DL — HIGH (ref 70–99)
GLUCOSE UR QL: >=1000 MG/DL
HCT VFR BLD CALC: 42.3 % — SIGNIFICANT CHANGE UP (ref 39–50)
HGB BLD-MCNC: 14.7 G/DL — SIGNIFICANT CHANGE UP (ref 13–17)
KETONES UR-MCNC: NEGATIVE MG/DL — SIGNIFICANT CHANGE UP
LEUKOCYTE ESTERASE UR-ACNC: NEGATIVE — SIGNIFICANT CHANGE UP
MAGNESIUM SERPL-MCNC: 2.3 MG/DL — SIGNIFICANT CHANGE UP (ref 1.6–2.6)
MCHC RBC-ENTMCNC: 30.8 PG — SIGNIFICANT CHANGE UP (ref 27–34)
MCHC RBC-ENTMCNC: 34.8 GM/DL — SIGNIFICANT CHANGE UP (ref 32–36)
MCV RBC AUTO: 88.7 FL — SIGNIFICANT CHANGE UP (ref 80–100)
NITRITE UR-MCNC: NEGATIVE — SIGNIFICANT CHANGE UP
NRBC # BLD: 0 /100 WBCS — SIGNIFICANT CHANGE UP (ref 0–0)
NRBC # FLD: 0 K/UL — SIGNIFICANT CHANGE UP (ref 0–0)
OSMOLALITY UR: 636 MOSM/KG — SIGNIFICANT CHANGE UP (ref 50–1200)
PH UR: 5.5 — SIGNIFICANT CHANGE UP (ref 5–8)
PHOSPHATE SERPL-MCNC: 3.7 MG/DL — SIGNIFICANT CHANGE UP (ref 2.5–4.5)
PLATELET # BLD AUTO: 241 K/UL — SIGNIFICANT CHANGE UP (ref 150–400)
POTASSIUM SERPL-MCNC: 3.4 MMOL/L — LOW (ref 3.5–5.3)
POTASSIUM SERPL-SCNC: 3.4 MMOL/L — LOW (ref 3.5–5.3)
PROT ?TM UR-MCNC: 18 MG/DL — SIGNIFICANT CHANGE UP
PROT UR-MCNC: SIGNIFICANT CHANGE UP MG/DL
PROT/CREAT UR-RTO: 0.1 RATIO — SIGNIFICANT CHANGE UP (ref 0–0.2)
RBC # BLD: 4.77 M/UL — SIGNIFICANT CHANGE UP (ref 4.2–5.8)
RBC # FLD: 12.8 % — SIGNIFICANT CHANGE UP (ref 10.3–14.5)
RBC CASTS # UR COMP ASSIST: 7 /HPF — HIGH (ref 0–4)
REVIEW: SIGNIFICANT CHANGE UP
SODIUM SERPL-SCNC: 136 MMOL/L — SIGNIFICANT CHANGE UP (ref 135–145)
SODIUM UR-SCNC: <20 MMOL/L — SIGNIFICANT CHANGE UP
SP GR SPEC: 1.02 — SIGNIFICANT CHANGE UP (ref 1–1.03)
SQUAMOUS # UR AUTO: 1 /HPF — SIGNIFICANT CHANGE UP (ref 0–5)
UROBILINOGEN FLD QL: 1 MG/DL — SIGNIFICANT CHANGE UP (ref 0.2–1)
UUN UR-MCNC: 1057 MG/DL — SIGNIFICANT CHANGE UP
WBC # BLD: 8.51 K/UL — SIGNIFICANT CHANGE UP (ref 3.8–10.5)
WBC # FLD AUTO: 8.51 K/UL — SIGNIFICANT CHANGE UP (ref 3.8–10.5)
WBC UR QL: 1 /HPF — SIGNIFICANT CHANGE UP (ref 0–5)
YEAST-LIKE CELLS: PRESENT

## 2023-08-18 PROCEDURE — 99232 SBSQ HOSP IP/OBS MODERATE 35: CPT | Mod: GC

## 2023-08-18 PROCEDURE — 76770 US EXAM ABDO BACK WALL COMP: CPT | Mod: 26

## 2023-08-18 PROCEDURE — 70450 CT HEAD/BRAIN W/O DYE: CPT | Mod: 26

## 2023-08-18 RX ORDER — ISOPROPYL ALCOHOL, BENZOCAINE .7; .06 ML/ML; ML/ML
0 SWAB TOPICAL
Qty: 100 | Refills: 1
Start: 2023-08-18

## 2023-08-18 RX ORDER — POTASSIUM CHLORIDE 20 MEQ
20 PACKET (EA) ORAL ONCE
Refills: 0 | Status: COMPLETED | OUTPATIENT
Start: 2023-08-18 | End: 2023-08-18

## 2023-08-18 RX ORDER — SODIUM CHLORIDE 9 MG/ML
1000 INJECTION INTRAMUSCULAR; INTRAVENOUS; SUBCUTANEOUS
Refills: 0 | Status: DISCONTINUED | OUTPATIENT
Start: 2023-08-18 | End: 2023-08-18

## 2023-08-18 RX ORDER — SODIUM CHLORIDE 9 MG/ML
1000 INJECTION INTRAMUSCULAR; INTRAVENOUS; SUBCUTANEOUS
Refills: 0 | Status: DISCONTINUED | OUTPATIENT
Start: 2023-08-18 | End: 2023-08-23

## 2023-08-18 RX ADMIN — PANTOPRAZOLE SODIUM 40 MILLIGRAM(S): 20 TABLET, DELAYED RELEASE ORAL at 06:38

## 2023-08-18 RX ADMIN — ATORVASTATIN CALCIUM 40 MILLIGRAM(S): 80 TABLET, FILM COATED ORAL at 21:46

## 2023-08-18 RX ADMIN — Medication 4: at 17:40

## 2023-08-18 RX ADMIN — GABAPENTIN 100 MILLIGRAM(S): 400 CAPSULE ORAL at 21:46

## 2023-08-18 RX ADMIN — TAMSULOSIN HYDROCHLORIDE 0.4 MILLIGRAM(S): 0.4 CAPSULE ORAL at 21:46

## 2023-08-18 RX ADMIN — GABAPENTIN 100 MILLIGRAM(S): 400 CAPSULE ORAL at 06:37

## 2023-08-18 RX ADMIN — ENOXAPARIN SODIUM 40 MILLIGRAM(S): 100 INJECTION SUBCUTANEOUS at 21:47

## 2023-08-18 RX ADMIN — AMLODIPINE BESYLATE 10 MILLIGRAM(S): 2.5 TABLET ORAL at 06:37

## 2023-08-18 RX ADMIN — SODIUM CHLORIDE 75 MILLILITER(S): 9 INJECTION INTRAMUSCULAR; INTRAVENOUS; SUBCUTANEOUS at 18:24

## 2023-08-18 RX ADMIN — MUPIROCIN 1 APPLICATION(S): 20 OINTMENT TOPICAL at 06:38

## 2023-08-18 RX ADMIN — Medication 81 MILLIGRAM(S): at 15:59

## 2023-08-18 RX ADMIN — LOSARTAN POTASSIUM 100 MILLIGRAM(S): 100 TABLET, FILM COATED ORAL at 06:37

## 2023-08-18 RX ADMIN — Medication 20 MILLIEQUIVALENT(S): at 09:30

## 2023-08-18 RX ADMIN — Medication 25 MILLIGRAM(S): at 15:58

## 2023-08-18 RX ADMIN — Medication 4 UNIT(S): at 17:41

## 2023-08-18 RX ADMIN — MUPIROCIN 1 APPLICATION(S): 20 OINTMENT TOPICAL at 17:41

## 2023-08-18 RX ADMIN — Medication 100 MILLIGRAM(S): at 17:42

## 2023-08-18 RX ADMIN — CITALOPRAM 20 MILLIGRAM(S): 10 TABLET, FILM COATED ORAL at 15:59

## 2023-08-18 RX ADMIN — Medication 100 MILLIGRAM(S): at 06:38

## 2023-08-18 RX ADMIN — Medication 25 MILLIGRAM(S): at 17:42

## 2023-08-18 RX ADMIN — INSULIN GLARGINE 14 UNIT(S): 100 INJECTION, SOLUTION SUBCUTANEOUS at 21:43

## 2023-08-18 RX ADMIN — Medication 25 MILLIGRAM(S): at 06:37

## 2023-08-18 RX ADMIN — GABAPENTIN 100 MILLIGRAM(S): 400 CAPSULE ORAL at 15:59

## 2023-08-18 RX ADMIN — FINASTERIDE 5 MILLIGRAM(S): 5 TABLET, FILM COATED ORAL at 16:00

## 2023-08-18 NOTE — DISCHARGE NOTE NURSING/CASE MANAGEMENT/SOCIAL WORK - NSDCFUADDAPPT_GEN_ALL_CORE_FT
Follow up with your primary care physician for further monitoring in 1-2 weeks. Please call to arrange appointment.     For rotator cuff tear: outpatient follow up with Orthopedics Dr. Peña, Dr. Helm, or Dr. Lainez. Please call [phone] to make an appointment.    For Diabetes:  follow up with Endocrinology outpatient at [address] [place] [address], [address], [place] [address]. [phone].      Follow up with Neurosurgeon Dr. Mendieta for management of cervical stenosis

## 2023-08-18 NOTE — PHARMACOTHERAPY INTERVENTION NOTE - COMMENTS
Current and new medications reviewed with the patient. Current medication schedule was discussed in detail including: medication name, indication, dose, administration times, treatment duration, side effects, and special instructions. Patient reports adherence to medications. Also discussed DM management. Pt educated on A1c, Ozempic pen administration, hypoglycemia and treatment, healthy plate, exercise, and blood glucose monitoring. Counseled pt on where to inject Ozempic (abdomen, outer thighs), rotating injection site to prevent lipodystrophy, proper storage, and sharps disposal. Pt with good return demo of Ozempic and verbalized understanding. Pt encouraged for outpt f/u with medicine and endocrine. Questions and concerns were answered and addressed.  Patient provided with educational handout.    New medications: metoprolol, hydralazine    Quiana Sharpe, PharmD, [facility]  Clinical Pharmacy Specialist  [id number]  Current and new medications reviewed with the patient. Current medication schedule was discussed in detail including: medication name, indication, dose, administration times, treatment duration, side effects, and special instructions. Patient reports adherence to medications. Also discussed DM management. Pt educated on A1c, Ozempic pen administration, hypoglycemia and treatment, healthy plate, exercise, and blood glucose monitoring. Counseled pt on where to inject Ozempic (abdomen, outer thighs), rotating injection site to prevent lipodystrophy, proper storage, and sharps disposal. Pt with good return demo of Ozempic and verbalized understanding. Also provided glucometer teaching with patient's own Onetouch Reflect Verio glucometer (picked up from [employer]). Patient required coaching, and is having difficulty seeing the device properly because he doesn't have his proper glasses. He would benefit from repeat teaching. Pt encouraged for outpt f/u with medicine and endocrine. Questions and concerns were answered and addressed.  Patient provided with educational handout.    New medications: metoprolol, hydralazine    Quiana Sharpe, PharmD, BCPS  Clinical Pharmacy Specialist  [id number]

## 2023-08-18 NOTE — PROVIDER CONTACT NOTE (OTHER) - ASSESSMENT
Patient's dose hydralazine held due to parameter of hold for systolic less than 140, patient's /74.
BP of 150/118, pt asymptomatic

## 2023-08-18 NOTE — CONSULT NOTE ADULT - ASSESSMENT
82M with PMHx CAD s/p stents 2020, HTN, DM2, BPH, MDD presenting with left shoulder pain x2 days. Patient notes lateral shoulder pain that is worse with sitting up and better with lying down. It is limiting his ability to raise his arms above his shoulder level. Nephrology consult called for PHILIP    Assessment:  1) Non oliguric PHILIP likely pre-renal/dehydration vs ATN from renal hypoperfusion  2) Hypertensive nephropathy  3) Dm type II with diabetic neuropathy/nephropathy  4) BPH with LUTs  5)  Electrolytes disorder with Hypokalemia  6) MDD  7) Left shoulder pain    Recommend:  Strict I/o  Avoid Nephrotoxic agents  Check urinalysis, urine PCR, urine electrolytes  Bilateral renal ultrasound  IV fluids   Monitor BMP and electrolyte every 12 hrly  Replete electrolytes with goal K>4 and <5, Mag> 2, phos 2.5 to 3.5   Continue Flomax 0.4 mg po daily  Intermittent bladder scan  Optimal pain control  Avoid NSAIDs  Optimal BP/DM Control  Hold ACEI  Volume status and electrolytes noted  No RRT/HD  Will follow

## 2023-08-18 NOTE — CONSULT NOTE ADULT - SUBJECTIVE AND OBJECTIVE BOX
Best Wong MD (Nephrology)  [address], [facility],  [address],  [facility]  TEl: [phone]  Cell: [phone]    Patient is a 82y old  Male who presents with a chief complaint of left shoulder pain x2 days, abnl EKG (17 Aug 2023 20:19)      HPI:  82M with PMHx CAD s/p stents 2020, HTN, DM2, BPH, MDD presenting with left shoulder pain x2 days. Patient notes lateral shoulder pain that is worse with sitting up and better with lying down. It is limiting his ability to raise his arms above his shoulder level. He denies recent trauma, cuts, procedures to this joint. There is no swelling, erythema as well and denies arthritis in this joint as well. He went to see his PMD today and out of concern for an abnormal EKG he was sent to the ED for evaluation. He denies having any chest pain (despite ED provider note mentioning this). He denies fevers, chills, cough, SOB, palpitations, LH, LOC, abdominal pain, vomiting, diarrhea, LE swelling. Patient does not know all his medications. In 2020 he had CARMEN x2 placed to LCx and pRCA. At the time he was placed on DAPT. He is unsure if he is still on plavix but notes taking baby ASA every day. On surescripts plavix is not listed. He denies exertional CP or COOPER at this time. He believes he is on amlodipine, ASA, atorvastatin, citalopram, metformin when mentioned to him but he is unsure    Of note in ED L 3rd digit paronychia found and s/p I&D (14 Aug 2023 22:52)      PAST MEDICAL & SURGICAL HISTORY:  DM (diabetes mellitus)      HTN (hypertension)      Depression      Asthma      CAD (coronary artery disease)      S/P coronary artery stent placement            Allergies:  No Known Allergies      Home Medications:   acetaminophen     Tablet .. 650 milliGRAM(s) Oral every 6 hours PRN  amLODIPine   Tablet 10 milliGRAM(s) Oral daily  aspirin enteric coated 81 milliGRAM(s) Oral daily  atorvastatin 40 milliGRAM(s) Oral at bedtime  citalopram 20 milliGRAM(s) Oral daily  dextrose 5%. 1000 milliLiter(s) IV Continuous <Continuous>  dextrose 5%. 1000 milliLiter(s) IV Continuous <Continuous>  dextrose 50% Injectable 25 Gram(s) IV Push once  dextrose 50% Injectable 12.5 Gram(s) IV Push once  dextrose 50% Injectable 25 Gram(s) IV Push once  dextrose Oral Gel 15 Gram(s) Oral once PRN  doxycycline monohydrate Capsule 100 milliGRAM(s) Oral every 12 hours  enoxaparin Injectable 40 milliGRAM(s) SubCutaneous every 24 hours  finasteride 5 milliGRAM(s) Oral daily  gabapentin 100 milliGRAM(s) Oral three times a day  glucagon  Injectable 1 milliGRAM(s) IntraMuscular once  hydrALAZINE 25 milliGRAM(s) Oral three times a day  insulin glargine Injectable (LANTUS) 14 Unit(s) SubCutaneous at bedtime  insulin lispro (ADMELOG) corrective regimen sliding scale   SubCutaneous three times a day before meals  insulin lispro (ADMELOG) corrective regimen sliding scale   SubCutaneous at bedtime  insulin lispro Injectable (ADMELOG) 4 Unit(s) SubCutaneous three times a day before meals  melatonin 3 milliGRAM(s) Oral at bedtime PRN  metoprolol tartrate 25 milliGRAM(s) Oral two times a day  mupirocin 2% Ointment 1 Application(s) Topical two times a day  pantoprazole    Tablet 40 milliGRAM(s) Oral before breakfast  tamsulosin 0.4 milliGRAM(s) Oral at bedtime  traMADol 25 milliGRAM(s) Oral every 8 hours PRN      Hospital Medications:   MEDICATIONS  (STANDING):  amLODIPine   Tablet 10 milliGRAM(s) Oral daily  aspirin enteric coated 81 milliGRAM(s) Oral daily  atorvastatin 40 milliGRAM(s) Oral at bedtime  citalopram 20 milliGRAM(s) Oral daily  dextrose 5%. 1000 milliLiter(s) (50 mL/Hr) IV Continuous <Continuous>  dextrose 5%. 1000 milliLiter(s) (100 mL/Hr) IV Continuous <Continuous>  dextrose 50% Injectable 25 Gram(s) IV Push once  dextrose 50% Injectable 12.5 Gram(s) IV Push once  dextrose 50% Injectable 25 Gram(s) IV Push once  doxycycline monohydrate Capsule 100 milliGRAM(s) Oral every 12 hours  enoxaparin Injectable 40 milliGRAM(s) SubCutaneous every 24 hours  finasteride 5 milliGRAM(s) Oral daily  gabapentin 100 milliGRAM(s) Oral three times a day  glucagon  Injectable 1 milliGRAM(s) IntraMuscular once  hydrALAZINE 25 milliGRAM(s) Oral three times a day  insulin glargine Injectable (LANTUS) 14 Unit(s) SubCutaneous at bedtime  insulin lispro (ADMELOG) corrective regimen sliding scale   SubCutaneous three times a day before meals  insulin lispro (ADMELOG) corrective regimen sliding scale   SubCutaneous at bedtime  insulin lispro Injectable (ADMELOG) 4 Unit(s) SubCutaneous three times a day before meals  metoprolol tartrate 25 milliGRAM(s) Oral two times a day  mupirocin 2% Ointment 1 Application(s) Topical two times a day  pantoprazole    Tablet 40 milliGRAM(s) Oral before breakfast  tamsulosin 0.4 milliGRAM(s) Oral at bedtime      SOCIAL HISTORY:  Denies ETOh, Smoking,     Family History:  FAMILY HISTORY:  No pertinent family history in first degree relatives        ROS:  CONSTITUTIONAL: No fever or chills.  HEENT: No headche, visual disturbances, hearing impairment.  RESPIRATORY: No shortness of breath, cough, wheezing or hemoptysis  CARDIOVASCULAR: No Chest pain, shortness of breath, palpitations, dizziness, syncope, orthopnea, PND or leg swelling.  GASTROINTESTINAL: No abdominal pain, nausea, vomiting, diarrhea, hematemesis or blood per rectum.  GENITOURINARY: No urinary frequency, urgency, gross hematuria, dysuria, foamy urine, flank or supra pubic pain, no prior history of kidney stones.  NEUROLOGICAL: No headache,  focal limb weakness, tingling or numbness sensation or seizure like activity  SKIN: No rash or skin lesion  MUSCULOSKELETAL: No leg pain, calf pain or swelling  Psych: Denies suicidal or homicidal ideation    VITALS:  T(F): 98.1 (08-18-23 @ 06:35), Max: 98.6 (08-17-23 @ 18:00)  HR: 90 (08-18-23 @ 06:35)  BP: 120/70 (08-18-23 @ 06:35)  RR: 18 (08-18-23 @ 06:35)  SpO2: 97% (08-18-23 @ 06:35)  Wt(kg): --      CAPILLARY BLOOD GLUCOSE      POCT Blood Glucose.: 168 mg/dL (18 Aug 2023 08:31)  POCT Blood Glucose.: 178 mg/dL (17 Aug 2023 22:42)  POCT Blood Glucose.: 220 mg/dL (17 Aug 2023 17:59)  POCT Blood Glucose.: 150 mg/dL (17 Aug 2023 12:39)        PHYSICAL EXAM:  GENERAL: Alert, awake, oriented x3   HEENT: JOSE, EOMI, neck supple, no JVP, no carotid bruits, no palpable thyromegaly or lymphadenopathy, no carotid bruits  CHEST/LUNG: Bilateral clear breath sounds, no rales, no crepitations, no wheezing  HEART: Regular rate and rhythm, ALIDA II/VI at LPSB, no gallops, no rub   ABDOMEN: Soft, nontender, non distended, bowel sounds present, no palpable organomegaly, no guarding, no rigidity, no rebound tenderness.  : No flank or supra pubic tenderness.  EXTREMITIES: Peripheral pulses are palpable, no pedal edema, no calf tenderness  Musculoskeletal: No joint or spinal tenderness  Neurology: AAOx3, no focal neurological deficit, Motor and sensory systems are intact.  SKIN: No rash or skin lesion    LABS:  08-18    136  |  103  |  44<H>  ----------------------------<  188<H>  3.4<L>   |  21<L>  |  1.82<H>    Ca    9.0      18 Aug 2023 05:25  Phos  3.7     08-18  Mg     2.30     08-18      Creatinine Trend: 1.82 <--, 1.33 <--, 1.06 <--                        14.7   8.51  )-----------( 241      ( 18 Aug 2023 05:25 )             42.3     Urine Studies:  Urinalysis Basic - ( 18 Aug 2023 05:25 )    Color:  / Appearance:  / SG:  / pH:   Gluc: 188 mg/dL / Ketone:   / Bili:  / Urobili:    Blood:  / Protein:  / Nitrite:    Leuk Esterase:  / RBC:  / WBC    Sq Epi:  / Non Sq Epi:  / Bacteria:           RADIOLOGY & ADDITIONAL STUDIES:    EKG findings reviewed.    Imaging Personally Reviewed:  [x] YES  [ ] NO    Consultant(s) and primary physician Notes Reviewed:  [x] YES  [ ] NO    Care Discussed with Primary team/ Consultants/Other Providers [x] YES  [ ] NO           Dwight Wong MD (Nephrology)  [address], [facility],  [address],  [facility]  TEl: [phone]  Cell: [phone]    Patient is a 82y old  Male who presents with a chief complaint of left shoulder pain x2 days, abnl EKG (17 Aug 2023 20:19)      HPI:  82M with PMHx CAD s/p stents 2020, HTN, DM2, BPH, MDD presenting with left shoulder pain x2 days. Patient notes lateral shoulder pain that is worse with sitting up and better with lying down. It is limiting his ability to raise his arms above his shoulder level. He denies recent trauma, cuts, procedures to this joint. There is no swelling, erythema as well and denies arthritis in this joint as well. He went to see his PMD today and out of concern for an abnormal EKG he was sent to the ED for evaluation. He denies having any chest pain (despite ED provider note mentioning this). He denies fevers, chills, cough, SOB, palpitations, LH, LOC, abdominal pain, vomiting, diarrhea, LE swelling. Patient does not know all his medications. In 2020 he had CARMEN x2 placed to LCx and pRCA. At the time he was placed on DAPT. He is unsure if he is still on plavix but notes taking baby ASA every day. On surescripts plavix is not listed. He denies exertional CP or COOPER at this time. He believes he is on amlodipine, ASA, atorvastatin, citalopram, metformin when mentioned to him but he is unsure    Of note in ED L 3rd digit paronychia found and s/p I&D (14 Aug 2023 22:52)      PAST MEDICAL & SURGICAL HISTORY:  DM (diabetes mellitus)      HTN (hypertension)      Depression      Asthma      CAD (coronary artery disease)      S/P coronary artery stent placement            Allergies:  No Known Allergies      Home Medications:   acetaminophen     Tablet .. 650 milliGRAM(s) Oral every 6 hours PRN  amLODIPine   Tablet 10 milliGRAM(s) Oral daily  aspirin enteric coated 81 milliGRAM(s) Oral daily  atorvastatin 40 milliGRAM(s) Oral at bedtime  citalopram 20 milliGRAM(s) Oral daily  dextrose 5%. 1000 milliLiter(s) IV Continuous <Continuous>  dextrose 5%. 1000 milliLiter(s) IV Continuous <Continuous>  dextrose 50% Injectable 25 Gram(s) IV Push once  dextrose 50% Injectable 12.5 Gram(s) IV Push once  dextrose 50% Injectable 25 Gram(s) IV Push once  dextrose Oral Gel 15 Gram(s) Oral once PRN  doxycycline monohydrate Capsule 100 milliGRAM(s) Oral every 12 hours  enoxaparin Injectable 40 milliGRAM(s) SubCutaneous every 24 hours  finasteride 5 milliGRAM(s) Oral daily  gabapentin 100 milliGRAM(s) Oral three times a day  glucagon  Injectable 1 milliGRAM(s) IntraMuscular once  hydrALAZINE 25 milliGRAM(s) Oral three times a day  insulin glargine Injectable (LANTUS) 14 Unit(s) SubCutaneous at bedtime  insulin lispro (ADMELOG) corrective regimen sliding scale   SubCutaneous three times a day before meals  insulin lispro (ADMELOG) corrective regimen sliding scale   SubCutaneous at bedtime  insulin lispro Injectable (ADMELOG) 4 Unit(s) SubCutaneous three times a day before meals  melatonin 3 milliGRAM(s) Oral at bedtime PRN  metoprolol tartrate 25 milliGRAM(s) Oral two times a day  mupirocin 2% Ointment 1 Application(s) Topical two times a day  pantoprazole    Tablet 40 milliGRAM(s) Oral before breakfast  tamsulosin 0.4 milliGRAM(s) Oral at bedtime  traMADol 25 milliGRAM(s) Oral every 8 hours PRN      Hospital Medications:   MEDICATIONS  (STANDING):  amLODIPine   Tablet 10 milliGRAM(s) Oral daily  aspirin enteric coated 81 milliGRAM(s) Oral daily  atorvastatin 40 milliGRAM(s) Oral at bedtime  citalopram 20 milliGRAM(s) Oral daily  dextrose 5%. 1000 milliLiter(s) (50 mL/Hr) IV Continuous <Continuous>  dextrose 5%. 1000 milliLiter(s) (100 mL/Hr) IV Continuous <Continuous>  dextrose 50% Injectable 25 Gram(s) IV Push once  dextrose 50% Injectable 12.5 Gram(s) IV Push once  dextrose 50% Injectable 25 Gram(s) IV Push once  doxycycline monohydrate Capsule 100 milliGRAM(s) Oral every 12 hours  enoxaparin Injectable 40 milliGRAM(s) SubCutaneous every 24 hours  finasteride 5 milliGRAM(s) Oral daily  gabapentin 100 milliGRAM(s) Oral three times a day  glucagon  Injectable 1 milliGRAM(s) IntraMuscular once  hydrALAZINE 25 milliGRAM(s) Oral three times a day  insulin glargine Injectable (LANTUS) 14 Unit(s) SubCutaneous at bedtime  insulin lispro (ADMELOG) corrective regimen sliding scale   SubCutaneous three times a day before meals  insulin lispro (ADMELOG) corrective regimen sliding scale   SubCutaneous at bedtime  insulin lispro Injectable (ADMELOG) 4 Unit(s) SubCutaneous three times a day before meals  metoprolol tartrate 25 milliGRAM(s) Oral two times a day  mupirocin 2% Ointment 1 Application(s) Topical two times a day  pantoprazole    Tablet 40 milliGRAM(s) Oral before breakfast  tamsulosin 0.4 milliGRAM(s) Oral at bedtime      SOCIAL HISTORY:  Denies ETOh, Smoking,     Family History:  FAMILY HISTORY:  No pertinent family history in first degree relatives        ROS:  CONSTITUTIONAL: No fever or chills.  HEENT: No headache visual disturbances, hearing impairment.  RESPIRATORY: No shortness of breath, cough, wheezing or hemoptysis  CARDIOVASCULAR: No Chest pain or SOB  GASTROINTESTINAL: No abdominal pain, nausea, vomiting, diarrhea, hematemesis or blood per rectum.  GENITOURINARY: No flank or supra pubic pain  NEUROLOGICAL: No headache,  focal limb weakness, tingling or numbness sensation  SKIN: No rash or skin lesion  MUSCULOSKELETAL: Left shoulder pain, calf pain or swelling  Psych: Denies suicidal or homicidal ideation    VITALS:  T(F): 98.1 (08-18-23 @ 06:35), Max: 98.6 (08-17-23 @ 18:00)  HR: 90 (08-18-23 @ 06:35)  BP: 120/70 (08-18-23 @ 06:35)  RR: 18 (08-18-23 @ 06:35)  SpO2: 97% (08-18-23 @ 06:35)  Wt(kg): --      CAPILLARY BLOOD GLUCOSE      POCT Blood Glucose.: 168 mg/dL (18 Aug 2023 08:31)  POCT Blood Glucose.: 178 mg/dL (17 Aug 2023 22:42)  POCT Blood Glucose.: 220 mg/dL (17 Aug 2023 17:59)  POCT Blood Glucose.: 150 mg/dL (17 Aug 2023 12:39)        PHYSICAL EXAM:  GENERAL: Alert, awake, oriented x3   HEENT: JOSE, EOMI, neck supple, no JVP  CHEST/LUNG: Bilateral clear breath sounds, no rales, no crepitations, no wheezing  HEART: Regular rate and rhythm, ALIDA II/VI at LPSB, no gallops, no rub   ABDOMEN: Soft, nontender, non distended, bowel sounds present, no palpable organomegaly  : No flank or supra pubic tenderness.  EXTREMITIES: Peripheral pulses are palpable, no pedal edema, no calf tenderness  Musculoskeletal: Left shoulder tenderness+nt  Neurology: AAOx3, no focal neurological deficit, Motor and sensory systems are intact.  SKIN: No rash or skin lesion    LABS:  08-18    136  |  103  |  44<H>  ----------------------------<  188<H>  3.4<L>   |  21<L>  |  1.82<H>    Ca    9.0      18 Aug 2023 05:25  Phos  3.7     08-18  Mg     2.30     08-18      Creatinine Trend: 1.82 <--, 1.33 <--, 1.06 <--                        14.7   8.51  )-----------( 241      ( 18 Aug 2023 05:25 )             42.3     Urine Studies:  Urinalysis Basic - ( 18 Aug 2023 05:25 )    Color:  / Appearance:  / SG:  / pH:   Gluc: 188 mg/dL / Ketone:   / Bili:  / Urobili:    Blood:  / Protein:  / Nitrite:    Leuk Esterase:  / RBC:  / WBC    Sq Epi:  / Non Sq Epi:  / Bacteria:           RADIOLOGY & ADDITIONAL STUDIES:  rad< from: US Kidney and Bladder (08.18.23 @ 12:37) >    ACC: [id number] EXAM:  US KIDNEYS AND BLADDER   ORDERED BY: DWIGHT WONG     PROCEDURE DATE:  08/18/2023          INTERPRETATION:  CLINICAL INFORMATION: Acute renal insufficiency.    COMPARISON: CT 3/28/2008.    TECHNIQUE: Sonography of the kidneys and bladder.    FINDINGS:  Right kidney: 9.9 cm. No renal mass, hydronephrosis or calculi.    Left kidney: 10.3 cm. No renal mass, hydronephrosis or calculi.    Urinary bladder: Within normal limits.  Incidental enlarged prostate gland measuring 118 cc.It is noted that   this exam is not tailored to evaluate the prostate gland.    IMPRESSION:  No renal abnormality.        --- End of Report ---            BARBIE HAIR MD; Attending Radiologist  This document has been electronically signed. Aug 18 73654:02PM    < end of copied text >    EKG findings reviewed.    Imaging Personally Reviewed:  [x] YES  [ ] NO    Consultant(s) and primary physician Notes Reviewed:  [x] YES  [ ] NO    Care Discussed with Primary team/ Consultants/Other Providers [x] YES  [ ] NO

## 2023-08-18 NOTE — PROGRESS NOTE ADULT - SUBJECTIVE AND OBJECTIVE BOX
WILEY BLAKE  82y  Male      Patient is a 82y old  Male who presents with a chief complaint of left shoulder pain x2 days, abnl EKG (17 Aug 2023 10:34)  Patient was seen and examined.chart reviewed,c/o lt.shoulder area pain.no sob,no cp,no fever,no cough    REVIEW OF SYSTEMS:  CONSTITUTIONAL: No fever  RESPIRATORY: No cough, hemoptysis or shortness of breath  CARDIOVASCULAR: No chest pain, palpitations, dizziness, or leg swelling  GASTROINTESTINAL: No abdominal pain. nausea, vomiting, hematemesis  GENITOURINARY: No dysuria, frequency, hematuria   NEUROLOGICAL: No headaches, no dizziness  MUSCULOSKELETAL: No joint pain or swelling;       T(C): 36.7 (08-18-23 @ 19:58), Max: 36.7 (08-18-23 @ 11:00)  HR: 63 (08-18-23 @ 19:58) (61 - 66)  BP: 115/63 (08-18-23 @ 19:58) (111/64 - 118/70)  RR: 17 (08-18-23 @ 19:58) (17 - 18)  SpO2: 96% (08-18-23 @ 19:58) (96% - 98%)      MEDICATIONS  (STANDING):  amLODIPine   Tablet 10 milliGRAM(s) Oral daily  aspirin enteric coated 81 milliGRAM(s) Oral daily  atorvastatin 40 milliGRAM(s) Oral at bedtime  citalopram 20 milliGRAM(s) Oral daily  dextrose 5%. 1000 milliLiter(s) (50 mL/Hr) IV Continuous <Continuous>  dextrose 5%. 1000 milliLiter(s) (100 mL/Hr) IV Continuous <Continuous>  dextrose 50% Injectable 25 Gram(s) IV Push once  dextrose 50% Injectable 12.5 Gram(s) IV Push once  dextrose 50% Injectable 25 Gram(s) IV Push once  doxycycline monohydrate Capsule 100 milliGRAM(s) Oral every 12 hours  enoxaparin Injectable 40 milliGRAM(s) SubCutaneous every 24 hours  finasteride 5 milliGRAM(s) Oral daily  gabapentin 100 milliGRAM(s) Oral three times a day  glucagon  Injectable 1 milliGRAM(s) IntraMuscular once  hydrALAZINE 25 milliGRAM(s) Oral three times a day  insulin glargine Injectable (LANTUS) 14 Unit(s) SubCutaneous at bedtime  insulin lispro (ADMELOG) corrective regimen sliding scale   SubCutaneous three times a day before meals  insulin lispro (ADMELOG) corrective regimen sliding scale   SubCutaneous at bedtime  insulin lispro Injectable (ADMELOG) 4 Unit(s) SubCutaneous three times a day before meals  metoprolol tartrate 25 milliGRAM(s) Oral two times a day  mupirocin 2% Ointment 1 Application(s) Topical two times a day  pantoprazole    Tablet 40 milliGRAM(s) Oral before breakfast  sodium chloride 0.9%. 1000 milliLiter(s) (75 mL/Hr) IV Continuous <Continuous>  tamsulosin 0.4 milliGRAM(s) Oral at bedtime    MEDICATIONS  (PRN):  acetaminophen     Tablet .. 650 milliGRAM(s) Oral every 6 hours PRN Temp greater or equal to 38C (100.4F), Mild Pain (1 - 3)  dextrose Oral Gel 15 Gram(s) Oral once PRN Blood Glucose LESS THAN 70 milliGRAM(s)/deciliter  melatonin 3 milliGRAM(s) Oral at bedtime PRN Insomnia  traMADol 25 milliGRAM(s) Oral every 8 hours PRN Severe Pain (7 - 10)    CAPILLARY BLOOD GLUCOSE      POCT Blood Glucose.: 305 mg/dL (18 Aug 2023 17:29)  POCT Blood Glucose.: 168 mg/dL (18 Aug 2023 08:31)  POCT Blood Glucose.: 178 mg/dL (17 Aug 2023 22:42)      Depression      Asthma      CAD (coronary artery disease)      S/P coronary artery stent placement          RADIOLOGY & ADDITIONAL TESTS:    Imaging Personally Reviewed:  [ ] YES  [ ] NO    Consultant(s) Notes Reviewed:  [ ] YES  [ ] NO    PHYSICAL EXAM:  GENERAL: Alert and awake lying in bed in no distress  HEAD:  Atraumatic, Normocephalic  EYES: EOMI, JOSE, conjunctiva and sclera clear  NECK: Supple, No JVD, Normal thyroid  NERVOUS SYSTEM:  Alert & Oriented X3, Motor and sensory systems are intact, lt.arm weakness  CHEST/LUNG: Bilateral clear breath sounds, no rhochi, no wheezing, no crepitations,  HEART: Regular rate and rhythm; No murmurs, rubs, or gallops  ABDOMEN: Soft, Nontender, Nondistended; Bowel sounds present  EXTREMITIES:   Peripheral Pulses are palpable, no  edema                          14.7   8.51  )-----------( 241      ( 18 Aug 2023 05:25 )             42.3               136|103|44<188  3.4|21|1.82  9.0,2.30,3.7  08-18 @ 05:25      Care Discussed with Consultants/Other Providers [ ] YES  [ ] NO      Code Status: [] Full Code [] DNR [] DNI [] Goals of Care:   Disposition: [] ICU [] Stroke Unit [] RCU []PCU []Floor [] Discharge Home

## 2023-08-18 NOTE — PHYSICAL THERAPY INITIAL EVALUATION ADULT - GENERAL OBSERVATIONS, REHAB EVAL
Patient received in semi-supine in NAD, +telemetry monitor. Blood pressure = 112/59. Pt agreeable to participate in PT session and RN made aware.

## 2023-08-18 NOTE — PHYSICAL THERAPY INITIAL EVALUATION ADULT - PATIENT/FAMILY/SIGNIFICANT OTHER GOALS STATEMENT, PT EVAL
Acceptable US of the liver    Polyps were seen in the gallbladder but the polyps of the size you have are considered benign [Normal Appearance] : normal in appearance [No Masses] : no palpable masses [No Nipple Discharge] : no nipple discharge [No Axillary Lymphadenopathy] : no axillary lymphadenopathy [Normal] : soft, non-tender, non-distended, no masses palpated, no HSM and normal bowel sounds [Urethral Meatus] : the meatus of the urethra showed no abnormalities [External Female Genitalia] : normal external genitalia [FreeTextEntry1] : Vagina smooth free of lesions cervix atrophy semiclose cervical os no cervical motion tenderness uterus absent no adnexal tenderness or masses To feel better

## 2023-08-18 NOTE — PROGRESS NOTE ADULT - ASSESSMENT
Patient is an 81 y/o M with a PMHx of T2DM (not on insulin), CAD s/p stents 2020, HTN, BPH, and MDD presenting with left shoulder pain x2 days, concerning for OA vs. adhesive capsulitis. Endocrinology consulted for management of Type 2 DM.    #Type 2 Diabetes Mellitus  A1c: 10.5% (prior A1c 9.9% in 3/2023)  Home Meds: metformin 1000mg BID, Jardiance 25mg once daily, Ozempic 0.25mg once weekly (has taken 3 doses so far)  Does not monitor blood sugars at home  - No clinical signs or lab findings concerning for DKA  - c/w Lantus 14 units qhs  - c/w standing Admelog 4 units TID before meals  - c/w Admelog low correction scale TID before meals and low qhs scale  - c/w FS TID before meals and qhs  - Diet: consistent carb  - RD consult    Discharge Planning:  - Pt received 3x doses of 0.25mg Ozempic. Can d/c patient with 0.5mg Ozempic with instructions to start this dosage after he takes his 4th dose of 0.25mg.  - Trend eGFR to assess need for adjusting metformin dosage (if eGFR <45) - as of now plan is to resume metformin 1000mg BID  - Resume Jardiance 25mg po daily  - Given patient's age and recent initiation of Ozempic, can hold off on discharging patient on basal insulin  - Provide patient with glucometer, lancets, and test strips on discharge  - Pt can follow up with Endocrinology outpatient at [address] [place] [address], [address], [place] [address]. [phone].    #Hypertension  Home meds: amlodipine 10mg qd, losartan 50mg qd  - c/w losartan 100mg qd  - c/w metoprolol 25mg BID  - Management as per primary team    #CAD (coronary artery disease).   s/p CARMEN to pRCA and LCx in 2020  Home meds: aspirin 81mg qd, atorvastatin 40mg qhs  - c/w home medications  - Management as per primary team    #PHILIP  Pt w/ rising creatinine from admission (1.88 <-- 1.33 <-- 1.06)  - Renal US ordered  - Will need to adjust metformin dosage if eGFR <45 on discharge  - Management as per primary team Patient is an 81 y/o M with a PMHx of T2DM (not on insulin), CAD s/p stents 2020, HTN, BPH, and MDD presenting with left shoulder pain x2 days, concerning for OA vs. adhesive capsulitis. Endocrinology consulted for management of Type 2 DM.    #Type 2 Diabetes Mellitus  A1c: 10.5% (prior A1c 9.9% in 3/2023)  Home Meds: metformin 1000mg BID, Jardiance 25mg once daily, Ozempic 0.25mg once weekly (has taken 3 doses so far)  Does not monitor blood sugars at home  - No clinical signs or lab findings concerning for DKA  - c/w Lantus 14 units qhs  - c/w standing Admelog 4 units TID before meals  - c/w Admelog low correction scale TID before meals and low qhs scale  - c/w FS TID before meals and qhs  - Diet: consistent carb  - RD consult    Discharge Planning:  - Pt received 3x doses of 0.25mg Ozempic. Can d/c patient with 0.5mg Ozempic with instructions to start this dosage after he takes his 4th dose of 0.25mg.  - Trend eGFR to assess need for adjusting metformin dosage (if eGFR <45). Current eGFR 37, would discharge on metformin 500mg BID  - Resume Jardiance 25mg po daily  - Given patient's age and recent initiation of Ozempic, can hold off on discharging patient on basal insulin  - Provide patient with glucometer, lancets, and test strips on discharge  - Pt can follow up with Endocrinology outpatient at [address] [place] [address], [address], [place] [address]. [phone].    #Hypertension  Home meds: amlodipine 10mg qd, losartan 50mg qd  - c/w losartan 100mg qd  - c/w metoprolol 25mg BID  - Management as per primary team    #CAD (coronary artery disease).   s/p CARMEN to pRCA and LCx in 2020  Home meds: aspirin 81mg qd, atorvastatin 40mg qhs  - c/w home medications  - Management as per primary team    #PHILIP  Pt w/ rising creatinine from admission (1.88 <-- 1.33 <-- 1.06)  - Renal US ordered  - Will need to adjust metformin dosage if eGFR <45 on discharge  - Consider IV fluids since PHILIP likely pre-renal in etiology  - Management as per primary team

## 2023-08-18 NOTE — PROGRESS NOTE ADULT - ASSESSMENT
82M with CAD s/p stents 2020, HTN, DM2, BPH, MDD presenting with left shoulder pain x2 days. Patient notes lateral shoulder pain that is worse with sitting up and better with lying down. It is limiting his ability to raise his arms above his shoulder level.    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 no new finidngs   A1c 10.5     Impression:   1) LUE weakness does not seem central. possible shoulder related   2) Stroke likely small vessel, R thalalmic   3) unsteady gait from cerebellar atrophy     - MRI C spine and L shoulder pending   - c/w asa and statin therapy with LDL goal < 70mg/dL for secondary stroke prevention.   - PT/OT   - no obojection to gabapenting 100mg TID    -   lipid panel  - better DM control   - check FS, glucose control <180  - GI/DVT ppx   - Thank you for allowing me to participate in the care of this patient. Call with questions.   Kwaku Nguyen MD  Vascular Neurology  Office: [phone]

## 2023-08-18 NOTE — PHYSICAL THERAPY INITIAL EVALUATION ADULT - ADDITIONAL COMMENTS
Patient lives alone in a home with 3 steps to enter. His bedroom is on the ground floor. Pt reports he has a walker but prefers to use his cane. He is however, considering getting a rollator as he reports he needs frequent breaks when walking. Pt denies falls in the past 6 months. Reports he is independent with all ADLs and has family/friends in the community to help if needed.    Following evaluation, pt was left semireclined in bed in no distress, all lines in tact, call bell in reach, bed alarm engaged/

## 2023-08-18 NOTE — PHYSICAL THERAPY INITIAL EVALUATION ADULT - PERTINENT HX OF CURRENT PROBLEM, REHAB EVAL
Patient is an 82 year old male with a history of T2DM + diabetic neuropathy who presented to the hospital with left shoulder pain.

## 2023-08-18 NOTE — PROGRESS NOTE ADULT - ATTENDING COMMENTS
82M DM2 HbA1c 10.5%, goal would be < 8%  Uptitrate GLP1RA on dc (recently added outpatient).  Inpatient agree with insulin regimen.    Lizet Kong MD  Division of Endocrinology  Pager: [phone]    If after 6PM or before 9AM, or on weekends/holidays, please call endocrine answering service for assistance ([phone]).  For nonurgent matters email [email or web address] for assistance.
82M DM2 HbA1c 10.5%, goal would be < 8%  Uptitrate GLP1RA on dc (recently added outpatient). PHILIP noted trend GFR to decide about metformin dose on dc.  Inpatient agree with insulin regimen.    Lizet Kong MD  Division of Endocrinology  Pager: [phone]    If after 6PM or before 9AM, or on weekends/holidays, please call endocrine answering service for assistance ([phone]).  For nonurgent matters email [email or web address] for assistance.

## 2023-08-18 NOTE — DISCHARGE NOTE NURSING/CASE MANAGEMENT/SOCIAL WORK - NSDCPEFALRISK_GEN_ALL_CORE
For information on Fall & Injury Prevention, visit: [email or web address] OR  [email or web address] OR  https://www.cdc.gov/steadi/patient.html

## 2023-08-18 NOTE — DISCHARGE NOTE NURSING/CASE MANAGEMENT/SOCIAL WORK - NSDCVIVACCINE_GEN_ALL_CORE_FT
Tdap; 31-Mar-2017 22:48; Mohinder Aponte); [employer]; [id number]; IntraMuscular; IV; 0.5 milliLiter(s); VIS (VIS Published: 09-May-2013, VIS Presented: 31-Mar-2017);

## 2023-08-18 NOTE — DISCHARGE NOTE NURSING/CASE MANAGEMENT/SOCIAL WORK - PATIENT PORTAL LINK FT
You can access the FollowMyHealth Patient Portal offered by [facility] by registering at the following website: [email or web address]. By joining [employer]’s FollowMyHealth portal, you will also be able to view your health information using other applications (apps) compatible with our system.

## 2023-08-18 NOTE — PROGRESS NOTE ADULT - SUBJECTIVE AND OBJECTIVE BOX
NOTE INCOMPLETE/ IN PROGRESS  *Please wait for attending attestation for official recommendations.     Chief Complaint: left shoulder pain x2 days, abnl EKG    History:  Pt seen and examined at bedside. No acute overnight events. Patient continues to endorse left shoulder pain/weakness. Discussed discharge recommendations with the patient, including increasing dosage of Ozempic, and patient is agreeable to plan.       MEDICATIONS  (STANDING):  amLODIPine   Tablet 10 milliGRAM(s) Oral daily  aspirin enteric coated 81 milliGRAM(s) Oral daily  atorvastatin 40 milliGRAM(s) Oral at bedtime  citalopram 20 milliGRAM(s) Oral daily  dextrose 5%. 1000 milliLiter(s) (50 mL/Hr) IV Continuous <Continuous>  dextrose 5%. 1000 milliLiter(s) (100 mL/Hr) IV Continuous <Continuous>  dextrose 50% Injectable 25 Gram(s) IV Push once  dextrose 50% Injectable 12.5 Gram(s) IV Push once  dextrose 50% Injectable 25 Gram(s) IV Push once  doxycycline monohydrate Capsule 100 milliGRAM(s) Oral every 12 hours  enoxaparin Injectable 40 milliGRAM(s) SubCutaneous every 24 hours  finasteride 5 milliGRAM(s) Oral daily  gabapentin 100 milliGRAM(s) Oral three times a day  glucagon  Injectable 1 milliGRAM(s) IntraMuscular once  hydrALAZINE 25 milliGRAM(s) Oral three times a day  insulin glargine Injectable (LANTUS) 14 Unit(s) SubCutaneous at bedtime  insulin lispro (ADMELOG) corrective regimen sliding scale   SubCutaneous three times a day before meals  insulin lispro (ADMELOG) corrective regimen sliding scale   SubCutaneous at bedtime  insulin lispro Injectable (ADMELOG) 4 Unit(s) SubCutaneous three times a day before meals  metoprolol tartrate 25 milliGRAM(s) Oral two times a day  mupirocin 2% Ointment 1 Application(s) Topical two times a day  pantoprazole    Tablet 40 milliGRAM(s) Oral before breakfast  tamsulosin 0.4 milliGRAM(s) Oral at bedtime    MEDICATIONS  (PRN):  acetaminophen     Tablet .. 650 milliGRAM(s) Oral every 6 hours PRN Temp greater or equal to 38C (100.4F), Mild Pain (1 - 3)  dextrose Oral Gel 15 Gram(s) Oral once PRN Blood Glucose LESS THAN 70 milliGRAM(s)/deciliter  melatonin 3 milliGRAM(s) Oral at bedtime PRN Insomnia  traMADol 25 milliGRAM(s) Oral every 8 hours PRN Severe Pain (7 - 10)      Allergies  No Known Allergies      Review of Systems:  Constitutional: No fever  Eyes: No blurry vision  Neuro: No tremors  HEENT: No pain  Cardiovascular: No chest pain, palpitations  Respiratory: No SOB, no cough  GI: No nausea, vomiting, abdominal pain  : No dysuria  Skin: no rash  Psych: no depression  Endocrine: no polyuria, polydipsia  Hem/lymph: no swelling  Osteoporosis: no fractures  MSK: +L shoulder pain and limited ROM.  ALL OTHER SYSTEMS REVIEWED AND NEGATIVE      PHYSICAL EXAM:  VITALS: T(C): 36.7 (08-18-23 @ 06:35)  T(F): 98.1 (08-18-23 @ 06:35), Max: 98.6 (08-17-23 @ 18:00)  HR: 90 (08-18-23 @ 06:35) (67 - 90)  BP: 120/70 (08-18-23 @ 06:35) (111/52 - 140/73)  RR:  (16 - 18)  SpO2:  (96% - 100%)  Wt(kg): --    GENERAL: NAD, well-groomed, well-developed  EYES: No proptosis, no lid lag, anicteric  HEENT:  Atraumatic, Normocephalic, moist mucous membranes  THYROID: Normal size, no palpable nodules  RESPIRATORY: Clear to auscultation bilaterally; No rales, rhonchi, wheezing  CARDIOVASCULAR: Regular rate and rhythm; No murmurs; no peripheral edema  GI: Soft, nontender, non distended, normal bowel sounds  SKIN: +LUE 3rd digit paronychia s/p I&D. +Tenderness, erythema, swelling  MUSCULOSKELETAL: +Limited ROM in L shoulder. Full ROM in all other extremities.  NEURO: Sensation to light touch intact, extraocular movements intact, no tremor  PSYCH: Alert and oriented x 3, normal affect, normal mood  CUSHING'S SIGNS: no striae      CAPILLARY BLOOD GLUCOSE  POCT Blood Glucose.: 168 mg/dL (18 Aug 2023 08:31)  POCT Blood Glucose.: 178 mg/dL (17 Aug 2023 22:42)  POCT Blood Glucose.: 220 mg/dL (17 Aug 2023 17:59)  POCT Blood Glucose.: 150 mg/dL (17 Aug 2023 12:39)      Labs:  08-18  136  |  103  |  44<H>  ----------------------------<  188<H>  3.4<L>   |  21<L>  |  1.82<H>    eGFR: 37<L>    Ca    9.0      08-18  Mg     2.30     08-18  Phos  3.7     08-18    A1C with Estimated Average Glucose Result: 10.5 % (08-15-23 @ 02:00)  A1C with Estimated Average Glucose Result: 9.9 % (03-16-23 @ 15:24)    Thyroid Function Tests:  08-15 @ 02:00 TSH 2.72 FreeT4 -- T3 -- Anti TPO -- Anti Thyroglobulin Ab -- TSI --   Chief Complaint: left shoulder pain x2 days, abnl EKG    History:  Pt seen and examined at bedside. No acute overnight events. Patient continues to endorse left shoulder pain/weakness. Discussed discharge recommendations with the patient, including increasing dosage of Ozempic, and patient is agreeable to plan.       MEDICATIONS  (STANDING):  amLODIPine   Tablet 10 milliGRAM(s) Oral daily  aspirin enteric coated 81 milliGRAM(s) Oral daily  atorvastatin 40 milliGRAM(s) Oral at bedtime  citalopram 20 milliGRAM(s) Oral daily  dextrose 5%. 1000 milliLiter(s) (50 mL/Hr) IV Continuous <Continuous>  dextrose 5%. 1000 milliLiter(s) (100 mL/Hr) IV Continuous <Continuous>  dextrose 50% Injectable 25 Gram(s) IV Push once  dextrose 50% Injectable 12.5 Gram(s) IV Push once  dextrose 50% Injectable 25 Gram(s) IV Push once  doxycycline monohydrate Capsule 100 milliGRAM(s) Oral every 12 hours  enoxaparin Injectable 40 milliGRAM(s) SubCutaneous every 24 hours  finasteride 5 milliGRAM(s) Oral daily  gabapentin 100 milliGRAM(s) Oral three times a day  glucagon  Injectable 1 milliGRAM(s) IntraMuscular once  hydrALAZINE 25 milliGRAM(s) Oral three times a day  insulin glargine Injectable (LANTUS) 14 Unit(s) SubCutaneous at bedtime  insulin lispro (ADMELOG) corrective regimen sliding scale   SubCutaneous three times a day before meals  insulin lispro (ADMELOG) corrective regimen sliding scale   SubCutaneous at bedtime  insulin lispro Injectable (ADMELOG) 4 Unit(s) SubCutaneous three times a day before meals  metoprolol tartrate 25 milliGRAM(s) Oral two times a day  mupirocin 2% Ointment 1 Application(s) Topical two times a day  pantoprazole    Tablet 40 milliGRAM(s) Oral before breakfast  tamsulosin 0.4 milliGRAM(s) Oral at bedtime    MEDICATIONS  (PRN):  acetaminophen     Tablet .. 650 milliGRAM(s) Oral every 6 hours PRN Temp greater or equal to 38C (100.4F), Mild Pain (1 - 3)  dextrose Oral Gel 15 Gram(s) Oral once PRN Blood Glucose LESS THAN 70 milliGRAM(s)/deciliter  melatonin 3 milliGRAM(s) Oral at bedtime PRN Insomnia  traMADol 25 milliGRAM(s) Oral every 8 hours PRN Severe Pain (7 - 10)      Allergies  No Known Allergies      Review of Systems:  Constitutional: No fever  Eyes: No blurry vision  Neuro: No tremors  HEENT: No pain  Cardiovascular: No chest pain, palpitations  Respiratory: No SOB, no cough  GI: No nausea, vomiting, abdominal pain  : No dysuria  Skin: no rash  Psych: no depression  Endocrine: no polyuria, polydipsia  Hem/lymph: no swelling  Osteoporosis: no fractures  MSK: +L shoulder pain and limited ROM.  ALL OTHER SYSTEMS REVIEWED AND NEGATIVE      PHYSICAL EXAM:  VITALS: T(C): 36.7 (08-18-23 @ 06:35)  T(F): 98.1 (08-18-23 @ 06:35), Max: 98.6 (08-17-23 @ 18:00)  HR: 90 (08-18-23 @ 06:35) (67 - 90)  BP: 120/70 (08-18-23 @ 06:35) (111/52 - 140/73)  RR:  (16 - 18)  SpO2:  (96% - 100%)  Wt(kg): --    GENERAL: NAD, well-groomed, well-developed  EYES: No proptosis, no lid lag, anicteric  HEENT:  Atraumatic, Normocephalic, moist mucous membranes  THYROID: Normal size, no palpable nodules  RESPIRATORY: Clear to auscultation bilaterally; No rales, rhonchi, wheezing  CARDIOVASCULAR: Regular rate and rhythm; No murmurs; no peripheral edema  GI: Soft, nontender, non distended, normal bowel sounds  SKIN: +LUE 3rd digit paronychia s/p I&D. +Tenderness, erythema, swelling  MUSCULOSKELETAL: +Limited ROM in L shoulder. Full ROM in all other extremities.  NEURO: Sensation to light touch intact, extraocular movements intact, no tremor  PSYCH: Alert and oriented x 3, normal affect, normal mood  CUSHING'S SIGNS: no striae      CAPILLARY BLOOD GLUCOSE  POCT Blood Glucose.: 168 mg/dL (18 Aug 2023 08:31)  POCT Blood Glucose.: 178 mg/dL (17 Aug 2023 22:42)  POCT Blood Glucose.: 220 mg/dL (17 Aug 2023 17:59)  POCT Blood Glucose.: 150 mg/dL (17 Aug 2023 12:39)      Labs:  08-18  136  |  103  |  44<H>  ----------------------------<  188<H>  3.4<L>   |  21<L>  |  1.82<H>    eGFR: 37<L>    Ca    9.0      08-18  Mg     2.30     08-18  Phos  3.7     08-18    A1C with Estimated Average Glucose Result: 10.5 % (08-15-23 @ 02:00)  A1C with Estimated Average Glucose Result: 9.9 % (03-16-23 @ 15:24)    Thyroid Function Tests:  08-15 @ 02:00 TSH 2.72 FreeT4 -- T3 -- Anti TPO -- Anti Thyroglobulin Ab -- TSI --

## 2023-08-18 NOTE — PROGRESS NOTE ADULT - SUBJECTIVE AND OBJECTIVE BOX
Neurology     S: patient seen. doing okay       Medications: MEDICATIONS  (STANDING):  amLODIPine   Tablet 10 milliGRAM(s) Oral daily  aspirin enteric coated 81 milliGRAM(s) Oral daily  atorvastatin 40 milliGRAM(s) Oral at bedtime  citalopram 20 milliGRAM(s) Oral daily  dextrose 5%. 1000 milliLiter(s) (50 mL/Hr) IV Continuous <Continuous>  dextrose 5%. 1000 milliLiter(s) (100 mL/Hr) IV Continuous <Continuous>  dextrose 50% Injectable 25 Gram(s) IV Push once  dextrose 50% Injectable 12.5 Gram(s) IV Push once  dextrose 50% Injectable 25 Gram(s) IV Push once  doxycycline monohydrate Capsule 100 milliGRAM(s) Oral every 12 hours  enoxaparin Injectable 40 milliGRAM(s) SubCutaneous every 24 hours  finasteride 5 milliGRAM(s) Oral daily  gabapentin 100 milliGRAM(s) Oral three times a day  glucagon  Injectable 1 milliGRAM(s) IntraMuscular once  hydrALAZINE 25 milliGRAM(s) Oral three times a day  insulin glargine Injectable (LANTUS) 14 Unit(s) SubCutaneous at bedtime  insulin lispro (ADMELOG) corrective regimen sliding scale   SubCutaneous three times a day before meals  insulin lispro (ADMELOG) corrective regimen sliding scale   SubCutaneous at bedtime  insulin lispro Injectable (ADMELOG) 4 Unit(s) SubCutaneous three times a day before meals  metoprolol tartrate 25 milliGRAM(s) Oral two times a day  mupirocin 2% Ointment 1 Application(s) Topical two times a day  pantoprazole    Tablet 40 milliGRAM(s) Oral before breakfast  tamsulosin 0.4 milliGRAM(s) Oral at bedtime    MEDICATIONS  (PRN):  acetaminophen     Tablet .. 650 milliGRAM(s) Oral every 6 hours PRN Temp greater or equal to 38C (100.4F), Mild Pain (1 - 3)  dextrose Oral Gel 15 Gram(s) Oral once PRN Blood Glucose LESS THAN 70 milliGRAM(s)/deciliter  melatonin 3 milliGRAM(s) Oral at bedtime PRN Insomnia  traMADol 25 milliGRAM(s) Oral every 8 hours PRN Severe Pain (7 - 10)       Vitals:  Vital Signs Last 24 Hrs  T(C): 36.7 (18 Aug 2023 11:00), Max: 37 (17 Aug 2023 18:00)  T(F): 98.1 (18 Aug 2023 11:00), Max: 98.6 (17 Aug 2023 18:00)  HR: 61 (18 Aug 2023 11:00) (61 - 90)  BP: 111/64 (18 Aug 2023 11:00) (111/52 - 140/73)  BP(mean): --  RR: 18 (18 Aug 2023 11:00) (17 - 18)  SpO2: 97% (18 Aug 2023 11:00) (96% - 100%)    Parameters below as of 18 Aug 2023 11:00  Patient On (Oxygen Delivery Method): room air            General Exam:   General Appearance: Appropriately dressed and in no acute distress       Head: Normocephalic, atraumatic and no dysmorphic features  Ear, Nose, and Throat: Moist mucous membranes  CVS: S1S2+  Resp: No SOB, no wheeze or rhonchi  GI: soft NT/ND  Extremities: No edema or cyanosis  Skin:  L 3rd digit paronychia     Neurological Exam:  Mental Status: Awake, alert and oriented x 2-3.  Able to follow simple and complex verbal commands. Able to name and repeat. fluent speech. No obvious aphasia or dysarthria noted.   Cranial Nerves: PERRL, EOMI, VFFC, sensation V1-V3 intact,  no obvious facial asymmetry, equal elevation of palate, scm/trap 5/5, tongue is midline on protrusion. no obvious papilledema on fundoscopic exam. hearing is grossly intact.   Motor: Normal bulk, tone and strength throughout except LUE pain limited 3/5 weakness  Sensation: Intact to light touch and pinprick throughout. no right/left confusion. no extinction to tactile on DSS.   Reflexes: 1+ throughout at biceps, brachioradialis, triceps, patellars and ankles bilaterally and equal. No clonus. R toe and L toe were both downgoing.  Coordination: No dysmetria on FNF or HKS  Gait: cane [facility] [facility]     Data/Labs/Imaging which I personally reviewed.     Labs:       LABS:                          14.7   8.51  )-----------( 241      ( 18 Aug 2023 05:25 )             42.3     08-18    136  |  103  |  44<H>  ----------------------------<  188<H>  3.4<L>   |  21<L>  |  1.82<H>    Ca    9.0      18 Aug 2023 05:25  Phos  3.7     08-18  Mg     2.30     08-18          Urinalysis Basic - ( 18 Aug 2023 05:25 )    Color: x / Appearance: x / SG: x / pH: x  Gluc: 188 mg/dL / Ketone: x  / Bili: x / Urobili: x   Blood: x / Protein: x / Nitrite: x   Leuk Esterase: x / RBC: x / WBC x   Sq Epi: x / Non Sq Epi: x / Bacteria: x            < from: CT Head No Cont (03.16.23 @ 16:48) >    ACC: [id number] EXAM:  CT BRAIN   ORDERED BY: BK PAREKH     PROCEDURE DATE:  03/16/2023          INTERPRETATION:  CT head without IV contrast    CLINICAL INFORMATION: Dizziness    TECHNIQUE: Contiguous axial 5 mm sections were obtained through the head.   Sagittal and coronal 2-D reformatted images were also obtained.   This   scan was performed using automatic exposure control (radiation dose   reduction software) to obtain a diagnostic image quality scan with   patient dose as low as reasonably achievable.    FINDINGS:   No previous examinations are available for review.    The brain demonstrates mild periventricular and scattered bifrontal and   biparietal subcortical white matter ischemia. Old lacunar infarction is   seen within the lateral RIGHT thalamus. No acute cerebral cortical   infarct is seen.  No intracranial hemorrhage is found.  No mass effect is   found in the brain.    The ventricles, sulci and basal cisterns demonstrates mild cerebellar   atrophy.    The orbits are unremarkable.  The paranasal sinuses are clear.  The nasal   cavity appears intact.  The nasopharynx is symmetric.  The central skull   base, petrous temporal bones and calvarium remain intact.      IMPRESSION: Mild periventricular and scattered bifrontaland biparietal   subcortical white matter ischemia. Old lacunar infarction is seen within   the lateral RIGHT thalamus. Mild cerebellar atrophy is noted.    --- End of Report ---            BRYANT FRANCISCO MD; Attending Radiologist  This document has been electronically signed. Mar 16 2023  4:55PM    < end of copied text >  < from: CT Head No Cont (08.18.23 @ 10:01) >    ACC: [id number] EXAM:  CT BRAIN   ORDERED BY: KENNEDY PALOMARES     PROCEDURE DATE:  08/18/2023          INTERPRETATION:  CLINICAL INDICATION: Left arm weakness/pain for   approximately 5 days    COMPARISON: CT head 3/16/2023    TECHNIQUE: Axial noncontrast CT images of the head were obtained.   Sagittal and coronal reformatted images were provided. Bone and soft   tissue windows were evaluated.    FINDINGS:    There is no acute intracranial hemorrhage or mass effect. Gray-white   differentiation ispreserved. Mild periventricular and subcortical white   matter hypoattenuation compatible with chronic microvascular ischemic   changes. A chronic lacunar infarct involves the right thalamus/posterior   limb right internal capsule  No extra-axial collection.  Ventricles and sulci are normal in size for the patient's age without   hydrocephalus. Basal cisterns are patent.  Mild bilateral ethmoid air cell mucosal thickening. The remaining   visualized paranasal sinuses are clear. Mild bilateral mastoid air cell   opacification, nonspecific.  Calvarium is intact. Mild soft tissue thickening at the right nasal   sidewall. Correlate clinically.  Mild basilar artery and bilateral ICA calcifications.  Tortuosity of the proximal intracranial circulation is seen which   suggests hypertension.  Bilateral lens replacement.    IMPRESSION:    No acute hemorrhage, mass effect, or edema.  Chronic lacunar infarct right thalamus/posterior limb right internal   capsule    --- End of Report ---          ANTON HELLER MD; Resident Radiologist  This document has been electronically signed.  LESLI EPSTEIN MD; Attending Radiologist  This document has been electronically signed. Aug 18 2023 11:35AM    < end of copied text >

## 2023-08-18 NOTE — PHYSICAL THERAPY INITIAL EVALUATION ADULT - RANGE OF MOTION EXAMINATION, REHAB EVAL
Left shoulder flexion ~0-45 degrees/Right UE ROM was WFL (within functional limits)/bilateral lower extremity ROM was WFL (within functional limits)

## 2023-08-19 LAB
ALBUMIN SERPL ELPH-MCNC: 3 G/DL — LOW (ref 3.3–5)
ANION GAP SERPL CALC-SCNC: 10 MMOL/L — SIGNIFICANT CHANGE UP (ref 7–14)
BUN SERPL-MCNC: 42 MG/DL — HIGH (ref 7–23)
CALCIUM SERPL-MCNC: 8.3 MG/DL — LOW (ref 8.4–10.5)
CHLORIDE SERPL-SCNC: 108 MMOL/L — HIGH (ref 98–107)
CO2 SERPL-SCNC: 22 MMOL/L — SIGNIFICANT CHANGE UP (ref 22–31)
CREAT SERPL-MCNC: 1.51 MG/DL — HIGH (ref 0.5–1.3)
EGFR: 46 ML/MIN/1.73M2 — LOW
GLUCOSE BLDC GLUCOMTR-MCNC: 113 MG/DL — HIGH (ref 70–99)
GLUCOSE BLDC GLUCOMTR-MCNC: 164 MG/DL — HIGH (ref 70–99)
GLUCOSE BLDC GLUCOMTR-MCNC: 165 MG/DL — HIGH (ref 70–99)
GLUCOSE BLDC GLUCOMTR-MCNC: 185 MG/DL — HIGH (ref 70–99)
GLUCOSE SERPL-MCNC: 130 MG/DL — HIGH (ref 70–99)
HCT VFR BLD CALC: 39.7 % — SIGNIFICANT CHANGE UP (ref 39–50)
HGB BLD-MCNC: 14 G/DL — SIGNIFICANT CHANGE UP (ref 13–17)
MAGNESIUM SERPL-MCNC: 2.1 MG/DL — SIGNIFICANT CHANGE UP (ref 1.6–2.6)
MCHC RBC-ENTMCNC: 30.1 PG — SIGNIFICANT CHANGE UP (ref 27–34)
MCHC RBC-ENTMCNC: 35.3 GM/DL — SIGNIFICANT CHANGE UP (ref 32–36)
MCV RBC AUTO: 85.4 FL — SIGNIFICANT CHANGE UP (ref 80–100)
NRBC # BLD: 0 /100 WBCS — SIGNIFICANT CHANGE UP (ref 0–0)
NRBC # FLD: 0 K/UL — SIGNIFICANT CHANGE UP (ref 0–0)
PHOSPHATE SERPL-MCNC: 3.7 MG/DL — SIGNIFICANT CHANGE UP (ref 2.5–4.5)
PLATELET # BLD AUTO: 226 K/UL — SIGNIFICANT CHANGE UP (ref 150–400)
POTASSIUM SERPL-MCNC: 3.5 MMOL/L — SIGNIFICANT CHANGE UP (ref 3.5–5.3)
POTASSIUM SERPL-SCNC: 3.5 MMOL/L — SIGNIFICANT CHANGE UP (ref 3.5–5.3)
RBC # BLD: 4.65 M/UL — SIGNIFICANT CHANGE UP (ref 4.2–5.8)
RBC # FLD: 12.5 % — SIGNIFICANT CHANGE UP (ref 10.3–14.5)
SODIUM SERPL-SCNC: 140 MMOL/L — SIGNIFICANT CHANGE UP (ref 135–145)
WBC # BLD: 7.42 K/UL — SIGNIFICANT CHANGE UP (ref 3.8–10.5)
WBC # FLD AUTO: 7.42 K/UL — SIGNIFICANT CHANGE UP (ref 3.8–10.5)

## 2023-08-19 RX ORDER — POTASSIUM CHLORIDE 20 MEQ
20 PACKET (EA) ORAL ONCE
Refills: 0 | Status: COMPLETED | OUTPATIENT
Start: 2023-08-19 | End: 2023-08-19

## 2023-08-19 RX ADMIN — AMLODIPINE BESYLATE 10 MILLIGRAM(S): 2.5 TABLET ORAL at 05:43

## 2023-08-19 RX ADMIN — CITALOPRAM 20 MILLIGRAM(S): 10 TABLET, FILM COATED ORAL at 13:49

## 2023-08-19 RX ADMIN — MUPIROCIN 1 APPLICATION(S): 20 OINTMENT TOPICAL at 17:35

## 2023-08-19 RX ADMIN — Medication 4 UNIT(S): at 08:34

## 2023-08-19 RX ADMIN — Medication 81 MILLIGRAM(S): at 13:47

## 2023-08-19 RX ADMIN — INSULIN GLARGINE 14 UNIT(S): 100 INJECTION, SOLUTION SUBCUTANEOUS at 23:03

## 2023-08-19 RX ADMIN — FINASTERIDE 5 MILLIGRAM(S): 5 TABLET, FILM COATED ORAL at 13:47

## 2023-08-19 RX ADMIN — GABAPENTIN 100 MILLIGRAM(S): 400 CAPSULE ORAL at 22:00

## 2023-08-19 RX ADMIN — Medication 4 UNIT(S): at 12:35

## 2023-08-19 RX ADMIN — MUPIROCIN 1 APPLICATION(S): 20 OINTMENT TOPICAL at 05:44

## 2023-08-19 RX ADMIN — Medication 20 MILLIEQUIVALENT(S): at 22:00

## 2023-08-19 RX ADMIN — Medication 25 MILLIGRAM(S): at 13:47

## 2023-08-19 RX ADMIN — PANTOPRAZOLE SODIUM 40 MILLIGRAM(S): 20 TABLET, DELAYED RELEASE ORAL at 05:42

## 2023-08-19 RX ADMIN — Medication 100 MILLIGRAM(S): at 17:33

## 2023-08-19 RX ADMIN — ENOXAPARIN SODIUM 40 MILLIGRAM(S): 100 INJECTION SUBCUTANEOUS at 22:01

## 2023-08-19 RX ADMIN — Medication 1: at 17:33

## 2023-08-19 RX ADMIN — Medication 100 MILLIGRAM(S): at 05:41

## 2023-08-19 RX ADMIN — GABAPENTIN 100 MILLIGRAM(S): 400 CAPSULE ORAL at 05:41

## 2023-08-19 RX ADMIN — Medication 4 UNIT(S): at 17:34

## 2023-08-19 RX ADMIN — Medication 25 MILLIGRAM(S): at 17:33

## 2023-08-19 RX ADMIN — ATORVASTATIN CALCIUM 40 MILLIGRAM(S): 80 TABLET, FILM COATED ORAL at 22:00

## 2023-08-19 RX ADMIN — TAMSULOSIN HYDROCHLORIDE 0.4 MILLIGRAM(S): 0.4 CAPSULE ORAL at 22:00

## 2023-08-19 RX ADMIN — Medication 1: at 12:35

## 2023-08-19 RX ADMIN — GABAPENTIN 100 MILLIGRAM(S): 400 CAPSULE ORAL at 13:48

## 2023-08-19 NOTE — CONSULT NOTE ADULT - SUBJECTIVE AND OBJECTIVE BOX
[facility], Division of Infectious Diseases   IVY Victor S. Shah, Y. Patel, G. Casimir  [phone]   weekends and after hours [phone]    WILEY BLAKE  82y, Male  [id number]  HPI:  82M with PMHx CAD s/p stents 2020, HTN, DM2, BPH, MDD presenting with left shoulder pain x2 days. Patient notes lateral shoulder pain that is worse with sitting up and better with lying down. It is limiting his ability to raise his arms above his shoulder level. He denies recent trauma, cuts, procedures to this joint. There is no swelling, erythema as well and denies arthritis in this joint as well. states he can use his other arm to lift his left arm and it does not hurt but when he lets go it just falls down  He went to see his PMD  and out of concern for an abnormal EKG he was sent to the ED for evaluation. He denies having any chest pain . He denies fevers, chills, cough, SOB, palpitations, LH, LOC, abdominal pain, vomiting, diarrhea, LE swelling. Patient does not know all his medications.  Of note in ED L 3rd digit paronychia found and s/p I&D (14 Aug 2023 22:52)      PMH/PSH--  DM (diabetes mellitus)   HTN (hypertension)  Depression  Asthma  CAD (coronary artery disease)  S/P coronary artery stent placement        Allergies--  No Known Allergies      Medications--  Antibiotics: doxycycline monohydrate Capsule 100 milliGRAM(s) Oral every 12 hours    Immunologic:   Other: acetaminophen     Tablet .. PRN  amLODIPine   Tablet  aspirin enteric coated  atorvastatin  citalopram  dextrose 5%.  dextrose 5%.  dextrose 50% Injectable  dextrose 50% Injectable  dextrose 50% Injectable  dextrose Oral Gel PRN  enoxaparin Injectable  finasteride  gabapentin  glucagon  Injectable  hydrALAZINE  insulin glargine Injectable (LANTUS)  insulin lispro (ADMELOG) corrective regimen sliding scale  insulin lispro (ADMELOG) corrective regimen sliding scale  insulin lispro Injectable (ADMELOG)  melatonin PRN  metoprolol tartrate  mupirocin 2% Ointment  pantoprazole    Tablet  potassium chloride   Powder  sodium chloride 0.9%.  tamsulosin  traMADol PRN      Social History--  EtOH: denies ***  Tobacco: denies ***  Drug Use: denies ***    Family/Marital History--  No pertinent family history in first degree relatives        Travel/Environmental/Occupational History:  works at a school    Review of Systems:  REVIEW OF SYSTEMS  General: no fever, no chills, no wt loss	  Ophthalmologic: no blurry vision  Respiratory and Thorax: no cough, no dyspnea  Cardiovascular: no chest pain, no palpitations  Gastrointestinal:  no nausea, no vomiting, diarrhea  Genitourinary: no dysuria, no urgency, no frequency	  Musculoskeletal: no myalgias	  Neurological:  no headache	    Physical Exam--  Vital Signs: T(F): 98 (08-19-23 @ 17:30), Max: 98.3 (08-18-23 @ 21:45)  HR: 68 (08-19-23 @ 17:30)  BP: 132/68 (08-19-23 @ 17:30)  RR: 18 (08-19-23 @ 17:30)  SpO2: 98% (08-19-23 @ 17:30)  Wt(kg): --  General: Nontoxic-appearing Male in no acute distress.  HEENT: AT/NC. PE  Neck: Not rigid. No sense of mass.  Nodes: None palpable.  Lungs: Clear bilaterally without rales, wheezing or rhonchi  Heart: Regular rate and rhythm. No Murmur.  Abdomen: Bowel sounds present and normoactive. Soft. Nondistended. Nontender.   Back: No spinal tenderness. No costovertebral angle tenderness.   Extremities: No cyanosis or clubbing. No edema. left shoulder not tender, no edema, not hot,   Skin: Warm. Dry. Good turgor. No rash. No vasculitic stigmata.  Psychiatric: Appropriate affect and mood for situation.         Laboratory & Imaging Data--  CBC                        14.0   7.42  )-----------( 226      ( 19 Aug 2023 07:00 )             39.7       Chemistries  08-19    140  |  108<H>  |  42<H>  ----------------------------<  130<H>  3.5   |  22  |  1.51<H>    Ca    8.3<L>      19 Aug 2023 07:00  Phos  3.7     08-19  Mg     2.10     08-19    TPro  x   /  Alb  3.0<L>  /  TBili  x   /  DBili  x   /  AST  x   /  ALT  x   /  AlkPhos  x   08-19      Culture Data    < from: US Kidney and Bladder (08.18.23 @ 12:37) >    Urinary bladder: Within normal limits.  Incidental enlarged prostate gland measuring 118 cc.It is noted that   this exam is not tailored to evaluate the prostate gland.    IMPRESSION:  No renal abnormality.    < end of copied text >  < from: Xray Shoulder 2 Views, Left (08.14.23 @ 17:08) >  PROCEDURE DATE:  08/14/2023          INTERPRETATION:  CLINICAL INDICATION: Shoulder pain.  TECHNIQUE: XR SHOULDER 3 VIEWS LEFT.    COMPARISON: Chest x-ray 11/5/2020.    FINDINGS:    No acute fracture. No dislocation. Mild AC joint arthrosis.      IMPRESSION:  No acute fracture or dislocation.    < end of copied text >  < from: CT Head No Cont (08.18.23 @ 10:01) >  suggests hypertension.  Bilateral lens replacement.    IMPRESSION:    No acute hemorrhage, mass effect, or edema.  Chronic lacunar infarct right thalamus/posterior limb right internal   capsule    < end of copied text >         [facility], Division of Infectious Diseases   IVY Victor S. Shah, Y. Patel, G. Casimir  [phone]   weekends and after hours [phone]    WILEY BLAKE  82y, Male  [id number]  HPI:  82M with PMHx CAD s/p stents 2020, HTN, DM2, BPH, MDD presenting with left shoulder pain x2 days. Patient notes lateral shoulder pain that is worse with sitting up and better with lying down. It is limiting his ability to raise his arms above his shoulder level. He denies recent trauma, cuts, procedures to this joint. There is no swelling, erythema as well and denies arthritis in this joint as well. states he can use his other arm to lift his left arm and it does not hurt but when he lets go it just falls down  He went to see his PMD  and out of concern for an abnormal EKG he was sent to the ED for evaluation. He denies having any chest pain . He denies fevers, chills, cough, SOB, palpitations, LH, LOC, abdominal pain, vomiting, diarrhea, LE swelling. Patient does not know all his medications.  Of note in ED L 3rd digit paronychia found and s/p I&D (14 Aug 2023 22:52)      PMH/PSH--  DM (diabetes mellitus)   HTN (hypertension)  Depression  Asthma  CAD (coronary artery disease)  S/P coronary artery stent placement        Allergies--  No Known Allergies      Medications--  Antibiotics: doxycycline monohydrate Capsule 100 milliGRAM(s) Oral every 12 hours    Immunologic:   Other: acetaminophen     Tablet .. PRN  amLODIPine   Tablet  aspirin enteric coated  atorvastatin  citalopram  dextrose 5%.  dextrose 5%.  dextrose 50% Injectable  dextrose 50% Injectable  dextrose 50% Injectable  dextrose Oral Gel PRN  enoxaparin Injectable  finasteride  gabapentin  glucagon  Injectable  hydrALAZINE  insulin glargine Injectable (LANTUS)  insulin lispro (ADMELOG) corrective regimen sliding scale  insulin lispro (ADMELOG) corrective regimen sliding scale  insulin lispro Injectable (ADMELOG)  melatonin PRN  metoprolol tartrate  mupirocin 2% Ointment  pantoprazole    Tablet  potassium chloride   Powder  sodium chloride 0.9%.  tamsulosin  traMADol PRN      Social History--  EtOH: denies ***  Tobacco: denies ***  Drug Use: denies ***    Family/Marital History--  No pertinent family history in first degree relatives        Travel/Environmental/Occupational History:  works at a school    Review of Systems:  REVIEW OF SYSTEMS  General: no fever, no chills, no wt loss	  Ophthalmologic: no blurry vision  Respiratory and Thorax: no cough, no dyspnea  Cardiovascular: no chest pain, no palpitations  Gastrointestinal:  no nausea, no vomiting, diarrhea  Genitourinary: no dysuria, no urgency, no frequency	  Musculoskeletal: no myalgias	  Neurological:  no headache	    Physical Exam--  Vital Signs: T(F): 98 (08-19-23 @ 17:30), Max: 98.3 (08-18-23 @ 21:45)  HR: 68 (08-19-23 @ 17:30)  BP: 132/68 (08-19-23 @ 17:30)  RR: 18 (08-19-23 @ 17:30)  SpO2: 98% (08-19-23 @ 17:30)  Wt(kg): --  General: Nontoxic-appearing Male in no acute distress.  HEENT: AT/NC. PE  Neck: Not rigid. No sense of mass.  Nodes: None palpable.  Lungs: Clear bilaterally without rales, wheezing or rhonchi  Heart: Regular rate and rhythm. No Murmur.  Abdomen: Bowel sounds present and normoactive. Soft. Nondistended. Nontender.   Back: No spinal tenderness. No costovertebral angle tenderness.   Extremities: No cyanosis or clubbing. No edema. left shoulder not tender, no edema, not hot,   Skin: Warm. Dry. Good turgor. No rash. No vasculitic stigmata.  Psychiatric: Appropriate affect and mood for situation.         Laboratory & Imaging Data--  CBC                        14.0   7.42  )-----------( 226      ( 19 Aug 2023 07:00 )             39.7       Chemistries  08-19    140  |  108<H>  |  42<H>  ----------------------------<  130<H>  3.5   |  22  |  1.51<H>    Ca    8.3<L>      19 Aug 2023 07:00  Phos  3.7     08-19  Mg     2.10     08-19    TPro  x   /  Alb  3.0<L>  /  TBili  x   /  DBili  x   /  AST  x   /  ALT  x   /  AlkPhos  x   08-19      Culture Data    < from: US Kidney and Bladder (08.18.23 @ 12:37) >    Urinary bladder: Within normal limits.  Incidental enlarged prostate gland measuring 118 cc.It is noted that   this exam is not tailored to evaluate the prostate gland.    IMPRESSION:  No renal abnormality.    < end of copied text >  < from: Xray Shoulder 2 Views, Left (08.14.23 @ 17:08) >  PROCEDURE DATE:  08/14/2023          INTERPRETATION:  CLINICAL INDICATION: Shoulder pain.  TECHNIQUE: XR SHOULDER 3 VIEWS LEFT.    COMPARISON: Chest x-ray 11/5/2020.    FINDINGS:    No acute fracture. No dislocation. Mild AC joint arthrosis.      IMPRESSION:  No acute fracture or dislocation.    < end of copied text >  < from: CT Head No Cont (08.18.23 @ 10:01) >  suggests hypertension.  Bilateral lens replacement.    IMPRESSION:    No acute hemorrhage, mass effect, or edema.  Chronic lacunar infarct right thalamus/posterior limb right internal   capsule    < end of copied text >

## 2023-08-19 NOTE — PROGRESS NOTE ADULT - ASSESSMENT
82M with PMHx CAD s/p stents 2020, HTN, DM2, BPH, MDD presenting with left shoulder pain x2 days. Patient notes lateral shoulder pain that is worse with sitting up and better with lying down. It is limiting his ability to raise his arms above his shoulder level. Nephrology consult called for PHILIP    Assessment:  1) Non oliguric PHILIP likely pre-renal/dehydration vs ATN from renal hypoperfusion  2) Hypertensive nephropathy  3) Dm type II with diabetic neuropathy/nephropathy  4) BPH with LUTs  5)  Electrolytes disorder with Hypokalemia  6) MDD  7) Left shoulder pain/weakness    Recommend:  Strict I/o  Avoid Nephrotoxic agents  S cr 1.5 down trending with non oliguria  Urinalysis and urine electrolytes reviewed  Bilateral renal ultrasound results reviewed without obstruction  IV/po hydration  Monitor BMP and electrolyte daily  Replete electrolytes with goal K>4 and <5, Mag> 2, phos 2.5 to 3.5   Continue Flomax 0.4 mg po daily  Intermittent bladder scan to assess urinary retention  Optimal pain control  Avoid NSAIDs  Optimal BP/DM Control  Continue hold ACEI/ARB  Volume status and electrolytes noted  No RRT/HD  Neurology follow up for left arm weakness/pain  Will follow

## 2023-08-19 NOTE — PROGRESS NOTE ADULT - SUBJECTIVE AND OBJECTIVE BOX
WILEY BLAKE  82y  Male      Patient is a 82y old  Male who presents with a chief complaint of left shoulder pain x2 days, abnl EKG (17 Aug 2023 10:34)  Patient was seen and examined.chart reviewed,c/o lt.shoulder area pain.no sob,no cp,no fever,no cough    REVIEW OF SYSTEMS:  CONSTITUTIONAL: No fever  RESPIRATORY: No cough, hemoptysis or shortness of breath  CARDIOVASCULAR: No chest pain, palpitations, dizziness, or leg swelling  GASTROINTESTINAL: No abdominal pain. nausea, vomiting, hematemesis  GENITOURINARY: No dysuria, frequency, hematuria   NEUROLOGICAL: Shoulder weakness PRESENT   MUSCULOSKELETAL: No joint pain or swelling;       T(C): 36.7 (08-19-23 @ 05:40), Max: 36.7 (08-19-23 @ 05:40)  HR: 65 (08-19-23 @ 05:40) (65 - 65)  BP: 123/55 (08-19-23 @ 05:40) (123/55 - 123/55)  RR: 18 (08-19-23 @ 05:40) (18 - 18)  SpO2: 98% (08-19-23 @ 05:40) (98% - 98%)      MEDICATIONS  (STANDING):  amLODIPine   Tablet 10 milliGRAM(s) Oral daily  aspirin enteric coated 81 milliGRAM(s) Oral daily  atorvastatin 40 milliGRAM(s) Oral at bedtime  citalopram 20 milliGRAM(s) Oral daily  dextrose 5%. 1000 milliLiter(s) (50 mL/Hr) IV Continuous <Continuous>  dextrose 5%. 1000 milliLiter(s) (100 mL/Hr) IV Continuous <Continuous>  dextrose 50% Injectable 25 Gram(s) IV Push once  dextrose 50% Injectable 12.5 Gram(s) IV Push once  dextrose 50% Injectable 25 Gram(s) IV Push once  doxycycline monohydrate Capsule 100 milliGRAM(s) Oral every 12 hours  enoxaparin Injectable 40 milliGRAM(s) SubCutaneous every 24 hours  finasteride 5 milliGRAM(s) Oral daily  gabapentin 100 milliGRAM(s) Oral three times a day  glucagon  Injectable 1 milliGRAM(s) IntraMuscular once  hydrALAZINE 25 milliGRAM(s) Oral three times a day  insulin glargine Injectable (LANTUS) 14 Unit(s) SubCutaneous at bedtime  insulin lispro (ADMELOG) corrective regimen sliding scale   SubCutaneous three times a day before meals  insulin lispro (ADMELOG) corrective regimen sliding scale   SubCutaneous at bedtime  insulin lispro Injectable (ADMELOG) 4 Unit(s) SubCutaneous three times a day before meals  metoprolol tartrate 25 milliGRAM(s) Oral two times a day  mupirocin 2% Ointment 1 Application(s) Topical two times a day  pantoprazole    Tablet 40 milliGRAM(s) Oral before breakfast  potassium chloride   Powder 20 milliEquivalent(s) Oral once  sodium chloride 0.9%. 1000 milliLiter(s) (75 mL/Hr) IV Continuous <Continuous>  tamsulosin 0.4 milliGRAM(s) Oral at bedtime    MEDICATIONS  (PRN):  acetaminophen     Tablet .. 650 milliGRAM(s) Oral every 6 hours PRN Temp greater or equal to 38C (100.4F), Mild Pain (1 - 3)  dextrose Oral Gel 15 Gram(s) Oral once PRN Blood Glucose LESS THAN 70 milliGRAM(s)/deciliter  melatonin 3 milliGRAM(s) Oral at bedtime PRN Insomnia  traMADol 25 milliGRAM(s) Oral every 8 hours PRN Severe Pain (7 - 10)          RADIOLOGY & ADDITIONAL TESTS:    Imaging Personally Reviewed:  [ ] YES  [ ] NO    Consultant(s) Notes Reviewed:  [ ] YES  [ ] NO    PHYSICAL EXAM:  GENERAL: Alert and awake lying in bed in no distress  HEAD:  Atraumatic, Normocephalic  EYES: EOMI, JOSE, conjunctiva and sclera clear  NECK: Supple, No JVD, Normal thyroid  NERVOUS SYSTEM:  Alert & Oriented X3, Motor and sensory systems are intact, lt.arm weakness  CHEST/LUNG: Bilateral clear breath sounds, no rhochi, no wheezing, no crepitations,  HEART: Regular rate and rhythm; No murmurs, rubs, or gallops  ABDOMEN: Soft, Nontender, Nondistended; Bowel sounds present  EXTREMITIES:   Peripheral Pulses are palpable, no  edema                                14.0   7.42  )-----------( 226      ( 19 Aug 2023 07:00 )             39.7           LIVER FUNCTIONS - ( 19 Aug 2023 07:00 )  Alb: 3.0 g/dL / Pro: x     / ALK PHOS: x     / ALT: x     / AST: x     / GGT: x             140|108|42<130  3.5|22|1.51  8.3,2.10,3.7  08-19 @ 07:00    Care Discussed with Consultants/Other Providers [ ] YES  [ ] NO      Code Status: [] Full Code [] DNR [] DNI [] Goals of Care:   Disposition: [] ICU [] Stroke Unit [] RCU []PCU []Floor [] Discharge Home

## 2023-08-19 NOTE — CONSULT NOTE ADULT - ASSESSMENT
82m cad, hypertension, hyperlipid diabetes prostatomegaly  left shoulder weakness  lewis  paronychia s/p i and d    plan  no gu symptoms  ua with yeast-- asymptomatic antifungals not indicated   no micro from ed  cont doxycycline 100 bid for day 6 of 7 -- stop tomorrow   and warm soaks     for mri pending  trend renal function

## 2023-08-19 NOTE — PROGRESS NOTE ADULT - SUBJECTIVE AND OBJECTIVE BOX
Best Wong MD (Nephrology progress note)  [address], [facility],  [address],  [place] [id number]  TEl: [phone]  Cell: [phone]    Patient is a 82y Male seen and evaluated at bedside. Vital signs, laboratory data, imaging studies, consult notes reviewed done within past 24 hours. Overnight events noted.    Allergies    No Known Allergies    Intolerances        ROS:  CONSTITUTIONAL: No fever or chills.  HEENT: No headcahe or visual disturbances.  RESPIRATORY: No shortness of breath, cough, hemoptysis or wheezing  CARDIOVASCULAR: No Chest pain, shortness of breath, palpitations, dizziness, syncope, orthopnea, PND or leg swelling.  GASTROINTESTINAL: No abdominal pain, nausea, vomiting, diarrhea, hematemesis or blood per rectum.  GENITOURINARY: No urinary frequency, urgency, gross hematuria, dysuria, flank or supra pubic pain, difficulty passing urine.  NEUROLOGICAL: No headache, focal limb weakness, tingling or numbness or seizure like activity  SKIN: No skin rash or lesion  MUSCULOSKELETAL: No leg pain, calf pain or swelling    VITALS:    T(C): 36.7 (08-19-23 @ 05:40), Max: 36.8 (08-18-23 @ 21:45)  HR: 65 (08-19-23 @ 05:40) (60 - 66)  BP: 123/55 (08-19-23 @ 05:40) (111/64 - 129/74)  RR: 18 (08-19-23 @ 05:40) (17 - 18)  SpO2: 98% (08-19-23 @ 05:40) (96% - 98%)  CAPILLARY BLOOD GLUCOSE      POCT Blood Glucose.: 113 mg/dL (19 Aug 2023 08:24)  POCT Blood Glucose.: 204 mg/dL (18 Aug 2023 21:28)  POCT Blood Glucose.: 305 mg/dL (18 Aug 2023 17:29)  POCT Blood Glucose.: 168 mg/dL (18 Aug 2023 08:31)      MEDICATIONS  (STANDING):  amLODIPine   Tablet 10 milliGRAM(s) Oral daily  aspirin enteric coated 81 milliGRAM(s) Oral daily  atorvastatin 40 milliGRAM(s) Oral at bedtime  citalopram 20 milliGRAM(s) Oral daily  dextrose 5%. 1000 milliLiter(s) (100 mL/Hr) IV Continuous <Continuous>  dextrose 5%. 1000 milliLiter(s) (50 mL/Hr) IV Continuous <Continuous>  dextrose 50% Injectable 25 Gram(s) IV Push once  dextrose 50% Injectable 12.5 Gram(s) IV Push once  dextrose 50% Injectable 25 Gram(s) IV Push once  doxycycline monohydrate Capsule 100 milliGRAM(s) Oral every 12 hours  enoxaparin Injectable 40 milliGRAM(s) SubCutaneous every 24 hours  finasteride 5 milliGRAM(s) Oral daily  gabapentin 100 milliGRAM(s) Oral three times a day  glucagon  Injectable 1 milliGRAM(s) IntraMuscular once  hydrALAZINE 25 milliGRAM(s) Oral three times a day  insulin glargine Injectable (LANTUS) 14 Unit(s) SubCutaneous at bedtime  insulin lispro (ADMELOG) corrective regimen sliding scale   SubCutaneous three times a day before meals  insulin lispro (ADMELOG) corrective regimen sliding scale   SubCutaneous at bedtime  insulin lispro Injectable (ADMELOG) 4 Unit(s) SubCutaneous three times a day before meals  metoprolol tartrate 25 milliGRAM(s) Oral two times a day  mupirocin 2% Ointment 1 Application(s) Topical two times a day  pantoprazole    Tablet 40 milliGRAM(s) Oral before breakfast  sodium chloride 0.9%. 1000 milliLiter(s) (75 mL/Hr) IV Continuous <Continuous>  tamsulosin 0.4 milliGRAM(s) Oral at bedtime    MEDICATIONS  (PRN):  acetaminophen     Tablet .. 650 milliGRAM(s) Oral every 6 hours PRN Temp greater or equal to 38C (100.4F), Mild Pain (1 - 3)  dextrose Oral Gel 15 Gram(s) Oral once PRN Blood Glucose LESS THAN 70 milliGRAM(s)/deciliter  melatonin 3 milliGRAM(s) Oral at bedtime PRN Insomnia  traMADol 25 milliGRAM(s) Oral every 8 hours PRN Severe Pain (7 - 10)      PHYSICAL EXAM:  GENERAL: Alert, awake and oriented x3 in no distress  HEENT: JOSE, EOMI, neck supple, no JVP, no carotid bruit, oral mucosa moist and pink.  CHEST/LUNG: Bilateral clear breath sounds, no rales, no crepitations, no rhonchi, no wheezing  HEART: Regular rate and rhythm, ALIDA II/VI at LPSB, no gallops, no rub   ABDOMEN: Soft, nontender, non distended, bowel sounds present, no palpable organomegaly  : No flank or supra pubic tenderness.  EXTREMITIES: Peripheral pulses are palpable, no pedal edema  Neurology: AAOx3, no focal neurological deficit  SKIN: No rash or skin lesion  Musculoskeletal: No joint deformity or spinal tenderness.      Vascular ACCESS:     LABS:                        14.0   7.42  )-----------( 226      ( 19 Aug 2023 07:00 )             39.7     08-19    140  |  108<H>  |  42<H>  ----------------------------<  130<H>  3.5   |  22  |  1.51<H>    Ca    8.3<L>      19 Aug 2023 07:00  Phos  3.7     08-19  Mg     2.10     08-19          Urinalysis Basic - ( 19 Aug 2023 07:00 )    Color: x / Appearance: x / SG: x / pH: x  Gluc: 130 mg/dL / Ketone: x  / Bili: x / Urobili: x   Blood: x / Protein: x / Nitrite: x   Leuk Esterase: x / RBC: x / WBC x   Sq Epi: x / Non Sq Epi: x / Bacteria: x      Creatinine, Random Urine: 192 mg/dL (08-18 @ 16:25)  Protein/Creatinine Ratio Calculation: 0.1 Ratio (08-18 @ 16:25)  Osmolality, Random Urine: 636 mosm/kg (08-18 @ 16:25)  Sodium, Random Urine: <20 mmol/L (08-18 @ 16:25)        RADIOLOGY & ADDITIONAL STUDIES:    Imaging Personally Reviewed:  [x] YES  [ ] NO    Consultant(s) Notes Reviewed:  [x] YES  [ ] NO    Care Discussed with Primary team/ Other Providers [x] YES  [ ] NO       Dwight Wong MD (Nephrology progress note)  [address], [facility],  [address],  [facility]  TEl: [phone]  Cell: [phone]    Patient is a 82y Male seen and evaluated at bedside. Vital signs, laboratory data, imaging studies, consult notes reviewed done within past 24 hours. Overnight events noted. Patient alert and awake lying in bed in no distress feels better with left shoulder pain. Interval improving renal function with non oliguria and S cr 1.5     Allergies    No Known Allergies    Intolerances        ROS:  CONSTITUTIONAL: No fever or chills.  HEENT: No headache or visual disturbances.  RESPIRATORY: No shortness of breath, cough, hemoptysis or wheezing  CARDIOVASCULAR: No Chest pain, shortness of breath, palpitations, dizziness, syncope, orthopnea, PND or leg swelling.  GASTROINTESTINAL: No abdominal pain, nausea, vomiting, diarrhea, hematemesis or blood per rectum.  GENITOURINARY: No urinary frequency, urgency, gross hematuria, dysuria, flank or supra pubic pain, difficulty passing urine.  NEUROLOGICAL: No headache, focal limb weakness, tingling or numbness or seizure like activity  SKIN: No skin rash or lesion  MUSCULOSKELETAL: Left shoulder pain and weakness with some improvement    VITALS:    T(C): 36.7 (08-19-23 @ 05:40), Max: 36.8 (08-18-23 @ 21:45)  HR: 65 (08-19-23 @ 05:40) (60 - 66)  BP: 123/55 (08-19-23 @ 05:40) (111/64 - 129/74)  RR: 18 (08-19-23 @ 05:40) (17 - 18)  SpO2: 98% (08-19-23 @ 05:40) (96% - 98%)  CAPILLARY BLOOD GLUCOSE      POCT Blood Glucose.: 113 mg/dL (19 Aug 2023 08:24)  POCT Blood Glucose.: 204 mg/dL (18 Aug 2023 21:28)  POCT Blood Glucose.: 305 mg/dL (18 Aug 2023 17:29)  POCT Blood Glucose.: 168 mg/dL (18 Aug 2023 08:31)      MEDICATIONS  (STANDING):  amLODIPine   Tablet 10 milliGRAM(s) Oral daily  aspirin enteric coated 81 milliGRAM(s) Oral daily  atorvastatin 40 milliGRAM(s) Oral at bedtime  citalopram 20 milliGRAM(s) Oral daily  dextrose 5%. 1000 milliLiter(s) (100 mL/Hr) IV Continuous <Continuous>  dextrose 5%. 1000 milliLiter(s) (50 mL/Hr) IV Continuous <Continuous>  dextrose 50% Injectable 25 Gram(s) IV Push once  dextrose 50% Injectable 12.5 Gram(s) IV Push once  dextrose 50% Injectable 25 Gram(s) IV Push once  doxycycline monohydrate Capsule 100 milliGRAM(s) Oral every 12 hours  enoxaparin Injectable 40 milliGRAM(s) SubCutaneous every 24 hours  finasteride 5 milliGRAM(s) Oral daily  gabapentin 100 milliGRAM(s) Oral three times a day  glucagon  Injectable 1 milliGRAM(s) IntraMuscular once  hydrALAZINE 25 milliGRAM(s) Oral three times a day  insulin glargine Injectable (LANTUS) 14 Unit(s) SubCutaneous at bedtime  insulin lispro (ADMELOG) corrective regimen sliding scale   SubCutaneous three times a day before meals  insulin lispro (ADMELOG) corrective regimen sliding scale   SubCutaneous at bedtime  insulin lispro Injectable (ADMELOG) 4 Unit(s) SubCutaneous three times a day before meals  metoprolol tartrate 25 milliGRAM(s) Oral two times a day  mupirocin 2% Ointment 1 Application(s) Topical two times a day  pantoprazole    Tablet 40 milliGRAM(s) Oral before breakfast  sodium chloride 0.9%. 1000 milliLiter(s) (75 mL/Hr) IV Continuous <Continuous>  tamsulosin 0.4 milliGRAM(s) Oral at bedtime    MEDICATIONS  (PRN):  acetaminophen     Tablet .. 650 milliGRAM(s) Oral every 6 hours PRN Temp greater or equal to 38C (100.4F), Mild Pain (1 - 3)  dextrose Oral Gel 15 Gram(s) Oral once PRN Blood Glucose LESS THAN 70 milliGRAM(s)/deciliter  melatonin 3 milliGRAM(s) Oral at bedtime PRN Insomnia  traMADol 25 milliGRAM(s) Oral every 8 hours PRN Severe Pain (7 - 10)      PHYSICAL EXAM:  GENERAL: Alert, awake and oriented x2-3 in no distress  HEENT: JOSE, EOMI, neck supple, no JVP, no carotid bruit, oral mucosa moist and pink.  CHEST/LUNG: Bilateral clear breath sounds, no rales, no crepitations, no rhonchi, no wheezing  HEART: Regular rate and rhythm, ALIDA II/VI at LPSB, no gallops, no rub   ABDOMEN: Soft, nontender, non distended, bowel sounds present, no palpable organomegaly  : No flank or supra pubic tenderness.  EXTREMITIES: Peripheral pulses are palpable, no pedal edema  Neurology: AAOx2-3, no focal neurological deficit  SKIN: No rash or skin lesion  Musculoskeletal: No joint deformity or spinal tenderness.      Vascular ACCESS:     LABS:                        14.0   7.42  )-----------( 226      ( 19 Aug 2023 07:00 )             39.7     08-19    140  |  108<H>  |  42<H>  ----------------------------<  130<H>  3.5   |  22  |  1.51<H>    Ca    8.3<L>      19 Aug 2023 07:00  Phos  3.7     08-19  Mg     2.10     08-19          Urinalysis Basic - ( 19 Aug 2023 07:00 )    Color: x / Appearance: x / SG: x / pH: x  Gluc: 130 mg/dL / Ketone: x  / Bili: x / Urobili: x   Blood: x / Protein: x / Nitrite: x   Leuk Esterase: x / RBC: x / WBC x   Sq Epi: x / Non Sq Epi: x / Bacteria: x      Creatinine, Random Urine: 192 mg/dL (08-18 @ 16:25)  Protein/Creatinine Ratio Calculation: 0.1 Ratio (08-18 @ 16:25)  Osmolality, Random Urine: 636 mosm/kg (08-18 @ 16:25)  Sodium, Random Urine: <20 mmol/L (08-18 @ 16:25)        RADIOLOGY & ADDITIONAL STUDIES:  rad< from: US Kidney and Bladder (08.18.23 @ 12:37) >    ACC: [id number] EXAM:  US KIDNEYS AND BLADDER   ORDERED BY: DWIGHT WONG     PROCEDURE DATE:  08/18/2023          INTERPRETATION:  CLINICAL INFORMATION: Acute renal insufficiency.    COMPARISON: CT 3/28/2008.    TECHNIQUE: Sonography of the kidneys and bladder.    FINDINGS:  Right kidney: 9.9 cm. No renal mass, hydronephrosis or calculi.    Left kidney: 10.3 cm. No renal mass, hydronephrosis or calculi.    Urinary bladder: Within normal limits.  Incidental enlarged prostate gland measuring 118 cc.It is noted that   this exam is not tailored to evaluate the prostate gland.    IMPRESSION:  No renal abnormality.        --- End of Report ---            BARBIE HAIR MD; Attending Radiologist  This document has been electronically signed. Aug 18 85194:02PM    < end of copied text >    Imaging Personally Reviewed:  [x] YES  [ ] NO    Consultant(s) Notes Reviewed:  [x] YES  [ ] NO    Care Discussed with Primary team/ Other Providers [x] YES  [ ] NO

## 2023-08-19 NOTE — CHART NOTE - NSCHARTNOTEFT_GEN_A_CORE
Patient is an 81 y/o M with a PMHx of T2DM (not on insulin), CAD s/p stents 2020, HTN, BPH, and MDD presenting with left shoulder pain x2 days, concerning for OA vs. adhesive capsulitis. Endocrinology consulted for management of Type 2 DM.    #Type 2 Diabetes Mellitus  A1c: 10.5% (prior A1c 9.9% in 3/2023)  Home Meds: metformin 1000mg BID, Jardiance 25mg once daily, Ozempic 0.25mg once weekly (has taken 3 doses so far)  Does not monitor blood sugars at home  - No clinical signs or lab findings concerning for DKA  -Glucose Target above goal at dinner yesterday as pt was sana unitate lunch and didn't receive his standing insulin  - c/w Lantus 14 units qhs  - c/w standing Admelog 4 units TID before meals  - c/w Admelog low correction scale TID before meals and low qhs scale  - c/w FS TID before meals and qhs  - Diet: consistent carb  - RD consult    Discharge Planning:  - Pt received 3x doses of 0.25mg Ozempic. Can d/c patient with 0.5mg Ozempic with instructions to start this dosage after he takes his 4th dose of 0.25mg.  - Trend eGFR to assess need for adjusting metformin dosage (if eGFR <45). Current eGFR 37, would discharge on metformin 500mg BID  - Resume Jardiance 25mg po daily  - Given patient's age and recent initiation of Ozempic, can hold off on discharging patient on basal insulin  - Provide patient with glucometer, lancets, and test strips on discharge  - Pt can follow up with Endocrinology outpatient at [address] [place] [address], [address], [place] [address]. [phone].    #Hypertension  Home meds: amlodipine 10mg qd, losartan 50mg qd  - c/w losartan 100mg qd  - c/w metoprolol 25mg BID  - Management as per primary team    #CAD (coronary artery disease).   s/p CARMEN to pRCA and LCx in 2020  Home meds: aspirin 81mg qd, atorvastatin 40mg qhs  - c/w home medications  - Management as per primary team    #PHILIP  Pt w/ rising creatinine from admission (1.88 <-- 1.33 <-- 1.06)  - Renal US ordered  - Will need to adjust metformin dosage if eGFR <45 on discharge  - Consider IV fluids since PHILIP likely pre-renal in etiology  - Management as per primary team    Monique Montoya  Nurse Practitioner  Division of Endocrinology & Diabetes  In house pager #[phone]    If before 9AM or after 6PM, or on weekends/holidays, please call endocrine answering service for assistance ([phone]).For nonurgent matters email [email or web address] for assistance.       MEDICATIONS  (STANDING):  amLODIPine   Tablet 10 milliGRAM(s) Oral daily  aspirin enteric coated 81 milliGRAM(s) Oral daily  atorvastatin 40 milliGRAM(s) Oral at bedtime  citalopram 20 milliGRAM(s) Oral daily  dextrose 5%. 1000 milliLiter(s) (100 mL/Hr) IV Continuous <Continuous>  dextrose 5%. 1000 milliLiter(s) (50 mL/Hr) IV Continuous <Continuous>  dextrose 50% Injectable 12.5 Gram(s) IV Push once  dextrose 50% Injectable 25 Gram(s) IV Push once  dextrose 50% Injectable 25 Gram(s) IV Push once  doxycycline monohydrate Capsule 100 milliGRAM(s) Oral every 12 hours  enoxaparin Injectable 40 milliGRAM(s) SubCutaneous every 24 hours  finasteride 5 milliGRAM(s) Oral daily  gabapentin 100 milliGRAM(s) Oral three times a day  glucagon  Injectable 1 milliGRAM(s) IntraMuscular once  hydrALAZINE 25 milliGRAM(s) Oral three times a day  insulin glargine Injectable (LANTUS) 14 Unit(s) SubCutaneous at bedtime  insulin lispro (ADMELOG) corrective regimen sliding scale   SubCutaneous three times a day before meals  insulin lispro (ADMELOG) corrective regimen sliding scale   SubCutaneous at bedtime  insulin lispro Injectable (ADMELOG) 4 Unit(s) SubCutaneous three times a day before meals  metoprolol tartrate 25 milliGRAM(s) Oral two times a day  mupirocin 2% Ointment 1 Application(s) Topical two times a day  pantoprazole    Tablet 40 milliGRAM(s) Oral before breakfast  potassium chloride   Powder 20 milliEquivalent(s) Oral once  sodium chloride 0.9%. 1000 milliLiter(s) (75 mL/Hr) IV Continuous <Continuous>  tamsulosin 0.4 milliGRAM(s) Oral at bedtime    MEDICATIONS  (PRN):  acetaminophen     Tablet .. 650 milliGRAM(s) Oral every 6 hours PRN Temp greater or equal to 38C (100.4F), Mild Pain (1 - 3)  dextrose Oral Gel 15 Gram(s) Oral once PRN Blood Glucose LESS THAN 70 milliGRAM(s)/deciliter  melatonin 3 milliGRAM(s) Oral at bedtime PRN Insomnia  traMADol 25 milliGRAM(s) Oral every 8 hours PRN Severe Pain (7 - 10)      Allergies: No Known Allergies    PHYSICAL EXAM:  VITALS: T(C): 36.7 (08-19-23 @ 05:40)  T(F): 98.1 (08-19-23 @ 05:40), Max: 98.3 (08-18-23 @ 21:45)  HR: 65 (08-19-23 @ 05:40) (60 - 65)  BP: 123/55 (08-19-23 @ 05:40) (112/60 - 129/74)  RR:  (17 - 18)  SpO2:  (96% - 98%)      CAPILLARY BLOOD GLUCOSE  POCT Blood Glucose.: 164 mg/dL (19 Aug 2023 17:24)  POCT Blood Glucose.: 185 mg/dL (19 Aug 2023 12:19)  POCT Blood Glucose.: 113 mg/dL (19 Aug 2023 08:24)  POCT Blood Glucose.: 204 mg/dL (18 Aug 2023 21:28)    A1C with Estimated Average Glucose (08.15.23 @ 02:00)    A1C with Estimated Average Glucose Result: 10.5    Estimated Average Glucose: 255      08-19    140  |  108<H>  |  42<H>  ----------------------------<  130<H>  3.5   |  22  |  1.51<H>    eGFR: 46<L>    Ca    8.3<L>      08-19  Mg     2.10     08-19  Phos  3.7     08-19    TPro  x   /  Alb  3.0<L>  /  TBili  x   /  DBili  x   /  AST  x   /  ALT  x   /  AlkPhos  x   08-19      Thyroid Function Tests:  08-15 @ 02:00 TSH 2.72 FreeT4 -- T3 -- Anti TPO -- Anti Thyroglobulin Ab -- TSI --    Diet, DASH/TLC:   Sodium & Cholesterol Restricted  Consistent Carbohydrate {No Snacks} (CSTCHO) (08-16-23 @ 15:46) [Active]      Monique Montoya  Nurse Practitioner  Division of Endocrinology & Diabetes  In house pager #[phone]    If before 9AM or after 6PM, or on weekends/holidays, please call endocrine answering service for assistance ([phone]).For nonurgent matters email [email or web address] for assistance.

## 2023-08-20 LAB
GLUCOSE BLDC GLUCOMTR-MCNC: 117 MG/DL — HIGH (ref 70–99)
GLUCOSE BLDC GLUCOMTR-MCNC: 120 MG/DL — HIGH (ref 70–99)
GLUCOSE BLDC GLUCOMTR-MCNC: 124 MG/DL — HIGH (ref 70–99)
GLUCOSE BLDC GLUCOMTR-MCNC: 124 MG/DL — HIGH (ref 70–99)

## 2023-08-20 RX ADMIN — Medication 4 UNIT(S): at 18:20

## 2023-08-20 RX ADMIN — Medication 81 MILLIGRAM(S): at 12:53

## 2023-08-20 RX ADMIN — Medication 25 MILLIGRAM(S): at 12:52

## 2023-08-20 RX ADMIN — Medication 100 MILLIGRAM(S): at 06:40

## 2023-08-20 RX ADMIN — Medication 4 UNIT(S): at 12:52

## 2023-08-20 RX ADMIN — Medication 100 MILLIGRAM(S): at 17:35

## 2023-08-20 RX ADMIN — MUPIROCIN 1 APPLICATION(S): 20 OINTMENT TOPICAL at 17:36

## 2023-08-20 RX ADMIN — GABAPENTIN 100 MILLIGRAM(S): 400 CAPSULE ORAL at 12:53

## 2023-08-20 RX ADMIN — FINASTERIDE 5 MILLIGRAM(S): 5 TABLET, FILM COATED ORAL at 12:54

## 2023-08-20 RX ADMIN — Medication 25 MILLIGRAM(S): at 06:40

## 2023-08-20 RX ADMIN — GABAPENTIN 100 MILLIGRAM(S): 400 CAPSULE ORAL at 21:26

## 2023-08-20 RX ADMIN — MUPIROCIN 1 APPLICATION(S): 20 OINTMENT TOPICAL at 06:40

## 2023-08-20 RX ADMIN — INSULIN GLARGINE 14 UNIT(S): 100 INJECTION, SOLUTION SUBCUTANEOUS at 21:23

## 2023-08-20 RX ADMIN — ATORVASTATIN CALCIUM 40 MILLIGRAM(S): 80 TABLET, FILM COATED ORAL at 21:35

## 2023-08-20 RX ADMIN — Medication 25 MILLIGRAM(S): at 21:26

## 2023-08-20 RX ADMIN — AMLODIPINE BESYLATE 10 MILLIGRAM(S): 2.5 TABLET ORAL at 06:40

## 2023-08-20 RX ADMIN — Medication 4 UNIT(S): at 09:10

## 2023-08-20 RX ADMIN — GABAPENTIN 100 MILLIGRAM(S): 400 CAPSULE ORAL at 06:40

## 2023-08-20 RX ADMIN — CITALOPRAM 20 MILLIGRAM(S): 10 TABLET, FILM COATED ORAL at 12:53

## 2023-08-20 RX ADMIN — TAMSULOSIN HYDROCHLORIDE 0.4 MILLIGRAM(S): 0.4 CAPSULE ORAL at 21:26

## 2023-08-20 RX ADMIN — Medication 25 MILLIGRAM(S): at 17:36

## 2023-08-20 RX ADMIN — PANTOPRAZOLE SODIUM 40 MILLIGRAM(S): 20 TABLET, DELAYED RELEASE ORAL at 06:41

## 2023-08-20 NOTE — PROGRESS NOTE ADULT - SUBJECTIVE AND OBJECTIVE BOX
Dwight Wong MD (Nephrology progress note)  [address], [facility],  [address],  [facility]  TEl: [phone]  Cell: [phone]    Patient is a 82y Male seen and evaluated at bedside. Vital signs, laboratory data, imaging studies, consult notes reviewed done within past 24 hours. Overnight events noted. Patient lying in bed alert and awake in no distress remains with left shoulder pain. NO new BMP available.    Allergies    No Known Allergies    Intolerances        ROS:  CONSTITUTIONAL: No fever or chills.  HEENT: No headache or visual disturbances.  RESPIRATORY: No shortness of breath, cough, hemoptysis or wheezing  CARDIOVASCULAR: No Chest pain or SOB  GASTROINTESTINAL: No abdominal pain, nausea, vomiting, diarrhea, hematemesis or blood per rectum.  GENITOURINARY: No flank or supra pubic pain.  NEUROLOGICAL: No headache, focal limb weakness, tingling or numbness or seizure like activity  SKIN: No skin rash or lesion  MUSCULOSKELETAL: Left shoulder pain    VITALS:    T(C): 36.8 (08-20-23 @ 10:30), Max: 36.9 (08-20-23 @ 06:35)  HR: 64 (08-20-23 @ 12:45) (58 - 112)  BP: 127/78 (08-20-23 @ 12:45) (124/70 - 160/84)  RR: 18 (08-20-23 @ 10:30) (18 - 18)  SpO2: 99% (08-20-23 @ 10:30) (98% - 99%)  CAPILLARY BLOOD GLUCOSE      POCT Blood Glucose.: 120 mg/dL (20 Aug 2023 12:25)  POCT Blood Glucose.: 124 mg/dL (20 Aug 2023 08:36)  POCT Blood Glucose.: 165 mg/dL (19 Aug 2023 22:39)  POCT Blood Glucose.: 164 mg/dL (19 Aug 2023 17:24)      MEDICATIONS  (STANDING):  amLODIPine   Tablet 10 milliGRAM(s) Oral daily  aspirin enteric coated 81 milliGRAM(s) Oral daily  atorvastatin 40 milliGRAM(s) Oral at bedtime  citalopram 20 milliGRAM(s) Oral daily  dextrose 5%. 1000 milliLiter(s) (100 mL/Hr) IV Continuous <Continuous>  dextrose 5%. 1000 milliLiter(s) (50 mL/Hr) IV Continuous <Continuous>  dextrose 50% Injectable 25 Gram(s) IV Push once  dextrose 50% Injectable 12.5 Gram(s) IV Push once  dextrose 50% Injectable 25 Gram(s) IV Push once  doxycycline monohydrate Capsule 100 milliGRAM(s) Oral every 12 hours  enoxaparin Injectable 40 milliGRAM(s) SubCutaneous every 24 hours  finasteride 5 milliGRAM(s) Oral daily  gabapentin 100 milliGRAM(s) Oral three times a day  glucagon  Injectable 1 milliGRAM(s) IntraMuscular once  hydrALAZINE 25 milliGRAM(s) Oral three times a day  insulin glargine Injectable (LANTUS) 14 Unit(s) SubCutaneous at bedtime  insulin lispro (ADMELOG) corrective regimen sliding scale   SubCutaneous three times a day before meals  insulin lispro (ADMELOG) corrective regimen sliding scale   SubCutaneous at bedtime  insulin lispro Injectable (ADMELOG) 4 Unit(s) SubCutaneous three times a day before meals  metoprolol tartrate 25 milliGRAM(s) Oral two times a day  mupirocin 2% Ointment 1 Application(s) Topical two times a day  pantoprazole    Tablet 40 milliGRAM(s) Oral before breakfast  sodium chloride 0.9%. 1000 milliLiter(s) (75 mL/Hr) IV Continuous <Continuous>  tamsulosin 0.4 milliGRAM(s) Oral at bedtime    MEDICATIONS  (PRN):  acetaminophen     Tablet .. 650 milliGRAM(s) Oral every 6 hours PRN Temp greater or equal to 38C (100.4F), Mild Pain (1 - 3)  dextrose Oral Gel 15 Gram(s) Oral once PRN Blood Glucose LESS THAN 70 milliGRAM(s)/deciliter  melatonin 3 milliGRAM(s) Oral at bedtime PRN Insomnia  traMADol 25 milliGRAM(s) Oral every 8 hours PRN Severe Pain (7 - 10)      PHYSICAL EXAM:  GENERAL: Alert, awake and oriented x2-3 in no distress  HEENT: JOSE, EOMI, neck supple, no JVP, no carotid bruit, oral mucosa moist and pink.  CHEST/LUNG: Bilateral clear breath sounds, no rales, no crepitations, no rhonchi, no wheezing  HEART: Regular rate and rhythm, ALIDA II/VI at LPSB, no gallops, no rub   ABDOMEN: Soft, nontender, non distended, bowel sounds present, no palpable organomegaly  : No flank or supra pubic tenderness.  EXTREMITIES: Peripheral pulses are palpable, no pedal edema  Neurology: AAOx2-3, no focal neurological deficit  SKIN: No rash or skin lesion  Musculoskeletal: Left shoulder weakness/discomfort    Vascular ACCESS:     LABS:                        14.0   7.42  )-----------( 226      ( 19 Aug 2023 07:00 )             39.7     08-19    140  |  108<H>  |  42<H>  ----------------------------<  130<H>  3.5   |  22  |  1.51<H>    Ca    8.3<L>      19 Aug 2023 07:00  Phos  3.7     08-19  Mg     2.10     08-19    TPro  x   /  Alb  3.0<L>  /  TBili  x   /  DBili  x   /  AST  x   /  ALT  x   /  AlkPhos  x   08-19        Urinalysis Basic - ( 19 Aug 2023 07:00 )    Color: x / Appearance: x / SG: x / pH: x  Gluc: 130 mg/dL / Ketone: x  / Bili: x / Urobili: x   Blood: x / Protein: x / Nitrite: x   Leuk Esterase: x / RBC: x / WBC x   Sq Epi: x / Non Sq Epi: x / Bacteria: x      Creatinine, Random Urine: 192 mg/dL (08-18 @ 16:25)  Protein/Creatinine Ratio Calculation: 0.1 Ratio (08-18 @ 16:25)  Osmolality, Random Urine: 636 mosm/kg (08-18 @ 16:25)  Sodium, Random Urine: <20 mmol/L (08-18 @ 16:25)        RADIOLOGY & ADDITIONAL STUDIES:  rad< from: US Kidney and Bladder (08.18.23 @ 12:37) >    ACC: [id number] EXAM:  US KIDNEYS AND BLADDER   ORDERED BY: DWIGHT WONG     PROCEDURE DATE:  08/18/2023          INTERPRETATION:  CLINICAL INFORMATION: Acute renal insufficiency.    COMPARISON: CT 3/28/2008.    TECHNIQUE: Sonography of the kidneys and bladder.    FINDINGS:  Right kidney: 9.9 cm. No renal mass, hydronephrosis or calculi.    Left kidney: 10.3 cm. No renal mass, hydronephrosis or calculi.    Urinary bladder: Within normal limits.  Incidental enlarged prostate gland measuring 118 cc.It is noted that   this exam is not tailored to evaluate the prostate gland.    IMPRESSION:  No renal abnormality.        --- End of Report ---            BARBIE HAIR MD; Attending Radiologist  This document has been electronically signed. Aug 18 46200:02PM    < end of copied text >    Imaging Personally Reviewed:  [x] YES  [ ] NO    Consultant(s) Notes Reviewed:  [x] YES  [ ] NO    Care Discussed with Primary team/ Other Providers [x] YES  [ ] NO

## 2023-08-20 NOTE — PROGRESS NOTE ADULT - SUBJECTIVE AND OBJECTIVE BOX
WILEY BLAKE  82y  Male      Patient is a 82y old  Male who presents with a chief complaint of left shoulder pain x2 days, abnl EKG (17 Aug 2023 10:34)  Patient was seen and examined.chart reviewed,c/o lt.shoulder area pain.no sob,no cp,no fever,no cough    REVIEW OF SYSTEMS:  CONSTITUTIONAL: No fever  RESPIRATORY: No cough, hemoptysis or shortness of breath  CARDIOVASCULAR: No chest pain, palpitations, dizziness, or leg swelling  GASTROINTESTINAL: No abdominal pain. nausea, vomiting, hematemesis  GENITOURINARY: No dysuria, frequency, hematuria   NEUROLOGICAL: Shoulder weakness PRESENT   MUSCULOSKELETAL: No joint pain or swelling;     T(C): 36.6 (08-20-23 @ 17:30), Max: 36.9 (08-20-23 @ 14:30)  HR: 64 (08-20-23 @ 17:30) (64 - 112)  BP: 143/81 (08-20-23 @ 17:30) (122/80 - 143/81)  RR: 17 (08-20-23 @ 17:30) (17 - 18)  SpO2: 100% (08-20-23 @ 17:30) (99% - 100%)      MEDICATIONS  (STANDING):  amLODIPine   Tablet 10 milliGRAM(s) Oral daily  aspirin enteric coated 81 milliGRAM(s) Oral daily  atorvastatin 40 milliGRAM(s) Oral at bedtime  citalopram 20 milliGRAM(s) Oral daily  dextrose 5%. 1000 milliLiter(s) (50 mL/Hr) IV Continuous <Continuous>  dextrose 5%. 1000 milliLiter(s) (100 mL/Hr) IV Continuous <Continuous>  dextrose 50% Injectable 25 Gram(s) IV Push once  dextrose 50% Injectable 12.5 Gram(s) IV Push once  dextrose 50% Injectable 25 Gram(s) IV Push once  doxycycline monohydrate Capsule 100 milliGRAM(s) Oral every 12 hours  enoxaparin Injectable 40 milliGRAM(s) SubCutaneous every 24 hours  finasteride 5 milliGRAM(s) Oral daily  gabapentin 100 milliGRAM(s) Oral three times a day  glucagon  Injectable 1 milliGRAM(s) IntraMuscular once  hydrALAZINE 25 milliGRAM(s) Oral three times a day  insulin glargine Injectable (LANTUS) 14 Unit(s) SubCutaneous at bedtime  insulin lispro (ADMELOG) corrective regimen sliding scale   SubCutaneous three times a day before meals  insulin lispro (ADMELOG) corrective regimen sliding scale   SubCutaneous at bedtime  insulin lispro Injectable (ADMELOG) 4 Unit(s) SubCutaneous three times a day before meals  metoprolol tartrate 25 milliGRAM(s) Oral two times a day  mupirocin 2% Ointment 1 Application(s) Topical two times a day  pantoprazole    Tablet 40 milliGRAM(s) Oral before breakfast  sodium chloride 0.9%. 1000 milliLiter(s) (75 mL/Hr) IV Continuous <Continuous>  tamsulosin 0.4 milliGRAM(s) Oral at bedtime    MEDICATIONS  (PRN):  acetaminophen     Tablet .. 650 milliGRAM(s) Oral every 6 hours PRN Temp greater or equal to 38C (100.4F), Mild Pain (1 - 3)  dextrose Oral Gel 15 Gram(s) Oral once PRN Blood Glucose LESS THAN 70 milliGRAM(s)/deciliter  melatonin 3 milliGRAM(s) Oral at bedtime PRN Insomnia  traMADol 25 milliGRAM(s) Oral every 8 hours PRN Severe Pain (7 - 10)    RADIOLOGY & ADDITIONAL TESTS:    Imaging Personally Reviewed:  [ ] YES  [ ] NO    Consultant(s) Notes Reviewed:  [ ] YES  [ ] NO    PHYSICAL EXAM:  GENERAL: Alert and awake lying in bed in no distress  HEAD:  Atraumatic, Normocephalic  EYES: EOMI, JOSE, conjunctiva and sclera clear  NECK: Supple, No JVD, Normal thyroid  NERVOUS SYSTEM:  Alert & Oriented X3, Motor and sensory systems are intact, lt.arm weakness  CHEST/LUNG: Bilateral clear breath sounds, no rhochi, no wheezing, no crepitations,  HEART: Regular rate and rhythm; No murmurs, rubs, or gallops  ABDOMEN: Soft, Nontender, Nondistended; Bowel sounds present  EXTREMITIES:   Peripheral Pulses are palpable, no  edema                                14.0   7.42  )-----------( 226      ( 19 Aug 2023 07:00 )             39.7           LIVER FUNCTIONS - ( 19 Aug 2023 07:00 )  Alb: 3.0 g/dL / Pro: x     / ALK PHOS: x     / ALT: x     / AST: x     / GGT: x             140|108|42<130  3.5|22|1.51  8.3,2.10,3.7  08-19 @ 07:00    Care Discussed with Consultants/Other Providers [ ] YES  [ ] NO      Code Status: [] Full Code [] DNR [] DNI [] Goals of Care:   Disposition: [] ICU [] Stroke Unit [] RCU []PCU []Floor [] Discharge Home

## 2023-08-20 NOTE — PROGRESS NOTE ADULT - ASSESSMENT
82M with PMHx CAD s/p stents 2020, HTN, DM2, BPH, MDD presenting with left shoulder pain x2 days. Patient notes lateral shoulder pain that is worse with sitting up and better with lying down. It is limiting his ability to raise his arms above his shoulder level. Nephrology consult called for PHILIP    Assessment:  1) Non oliguric PHILIP with improving renal function with S cr 1.5 likely pre-renal/dehydration vs ATN from renal hypoperfusion  2) Hypertensive nephropathy  3) Dm type II with diabetic neuropathy/nephropathy  4) BPH with LUTs  5)  Electrolytes disorder with Hypokalemia  6) MDD  7) Left shoulder pain/weakness    Recommend:  Strict I/o  Avoid Nephrotoxic agents  S cr 1.5 down trending with non oliguria  No new BMP available  Urinalysis and urine electrolytes reviewed  Bilateral renal ultrasound results reviewed without obstruction  IV/po hydration  Monitor BMP and electrolyte daily  Replete electrolytes with goal K>4 and <5, Mag> 2, phos 2.5 to 3.5   Continue Flomax 0.4 mg po daily  Optimal pain control  Avoid NSAIDs  Optimal BP/DM Control  Continue hold ACEI/ARB  Volume status and electrolytes noted  No RRT/HD  Await Left shoulder MR to assess left shoulder pain/weakness  Will follow

## 2023-08-21 LAB
ANION GAP SERPL CALC-SCNC: 8 MMOL/L — SIGNIFICANT CHANGE UP (ref 7–14)
BUN SERPL-MCNC: 27 MG/DL — HIGH (ref 7–23)
CALCIUM SERPL-MCNC: 8.6 MG/DL — SIGNIFICANT CHANGE UP (ref 8.4–10.5)
CHLORIDE SERPL-SCNC: 107 MMOL/L — SIGNIFICANT CHANGE UP (ref 98–107)
CO2 SERPL-SCNC: 23 MMOL/L — SIGNIFICANT CHANGE UP (ref 22–31)
CREAT SERPL-MCNC: 1.24 MG/DL — SIGNIFICANT CHANGE UP (ref 0.5–1.3)
EGFR: 58 ML/MIN/1.73M2 — LOW
GLUCOSE BLDC GLUCOMTR-MCNC: 107 MG/DL — HIGH (ref 70–99)
GLUCOSE BLDC GLUCOMTR-MCNC: 118 MG/DL — HIGH (ref 70–99)
GLUCOSE BLDC GLUCOMTR-MCNC: 154 MG/DL — HIGH (ref 70–99)
GLUCOSE SERPL-MCNC: 155 MG/DL — HIGH (ref 70–99)
HCT VFR BLD CALC: 39.4 % — SIGNIFICANT CHANGE UP (ref 39–50)
HGB BLD-MCNC: 13.7 G/DL — SIGNIFICANT CHANGE UP (ref 13–17)
MAGNESIUM SERPL-MCNC: 1.9 MG/DL — SIGNIFICANT CHANGE UP (ref 1.6–2.6)
MCHC RBC-ENTMCNC: 30.9 PG — SIGNIFICANT CHANGE UP (ref 27–34)
MCHC RBC-ENTMCNC: 34.8 GM/DL — SIGNIFICANT CHANGE UP (ref 32–36)
MCV RBC AUTO: 88.7 FL — SIGNIFICANT CHANGE UP (ref 80–100)
NRBC # BLD: 0 /100 WBCS — SIGNIFICANT CHANGE UP (ref 0–0)
NRBC # FLD: 0 K/UL — SIGNIFICANT CHANGE UP (ref 0–0)
PHOSPHATE SERPL-MCNC: 3 MG/DL — SIGNIFICANT CHANGE UP (ref 2.5–4.5)
PLATELET # BLD AUTO: 223 K/UL — SIGNIFICANT CHANGE UP (ref 150–400)
POTASSIUM SERPL-MCNC: 4 MMOL/L — SIGNIFICANT CHANGE UP (ref 3.5–5.3)
POTASSIUM SERPL-SCNC: 4 MMOL/L — SIGNIFICANT CHANGE UP (ref 3.5–5.3)
RBC # BLD: 4.44 M/UL — SIGNIFICANT CHANGE UP (ref 4.2–5.8)
RBC # FLD: 12.4 % — SIGNIFICANT CHANGE UP (ref 10.3–14.5)
SODIUM SERPL-SCNC: 138 MMOL/L — SIGNIFICANT CHANGE UP (ref 135–145)
WBC # BLD: 7.16 K/UL — SIGNIFICANT CHANGE UP (ref 3.8–10.5)
WBC # FLD AUTO: 7.16 K/UL — SIGNIFICANT CHANGE UP (ref 3.8–10.5)

## 2023-08-21 PROCEDURE — 72141 MRI NECK SPINE W/O DYE: CPT | Mod: 26

## 2023-08-21 PROCEDURE — 99232 SBSQ HOSP IP/OBS MODERATE 35: CPT

## 2023-08-21 PROCEDURE — 73221 MRI JOINT UPR EXTREM W/O DYE: CPT | Mod: 26,LT

## 2023-08-21 RX ORDER — KETOROLAC TROMETHAMINE 30 MG/ML
15 SYRINGE (ML) INJECTION ONCE
Refills: 0 | Status: DISCONTINUED | OUTPATIENT
Start: 2023-08-21 | End: 2023-08-21

## 2023-08-21 RX ADMIN — AMLODIPINE BESYLATE 10 MILLIGRAM(S): 2.5 TABLET ORAL at 05:12

## 2023-08-21 RX ADMIN — CITALOPRAM 20 MILLIGRAM(S): 10 TABLET, FILM COATED ORAL at 18:03

## 2023-08-21 RX ADMIN — GABAPENTIN 100 MILLIGRAM(S): 400 CAPSULE ORAL at 05:11

## 2023-08-21 RX ADMIN — ENOXAPARIN SODIUM 40 MILLIGRAM(S): 100 INJECTION SUBCUTANEOUS at 00:52

## 2023-08-21 RX ADMIN — Medication 25 MILLIGRAM(S): at 05:12

## 2023-08-21 RX ADMIN — INSULIN GLARGINE 14 UNIT(S): 100 INJECTION, SOLUTION SUBCUTANEOUS at 23:10

## 2023-08-21 RX ADMIN — ATORVASTATIN CALCIUM 40 MILLIGRAM(S): 80 TABLET, FILM COATED ORAL at 23:07

## 2023-08-21 RX ADMIN — Medication 25 MILLIGRAM(S): at 22:39

## 2023-08-21 RX ADMIN — Medication 15 MILLIGRAM(S): at 14:05

## 2023-08-21 RX ADMIN — Medication 25 MILLIGRAM(S): at 17:59

## 2023-08-21 RX ADMIN — MUPIROCIN 1 APPLICATION(S): 20 OINTMENT TOPICAL at 18:00

## 2023-08-21 RX ADMIN — Medication 4 UNIT(S): at 08:50

## 2023-08-21 RX ADMIN — PANTOPRAZOLE SODIUM 40 MILLIGRAM(S): 20 TABLET, DELAYED RELEASE ORAL at 05:11

## 2023-08-21 RX ADMIN — TAMSULOSIN HYDROCHLORIDE 0.4 MILLIGRAM(S): 0.4 CAPSULE ORAL at 22:39

## 2023-08-21 RX ADMIN — Medication 1: at 17:57

## 2023-08-21 RX ADMIN — MUPIROCIN 1 APPLICATION(S): 20 OINTMENT TOPICAL at 05:11

## 2023-08-21 RX ADMIN — FINASTERIDE 5 MILLIGRAM(S): 5 TABLET, FILM COATED ORAL at 18:01

## 2023-08-21 RX ADMIN — ENOXAPARIN SODIUM 40 MILLIGRAM(S): 100 INJECTION SUBCUTANEOUS at 22:38

## 2023-08-21 RX ADMIN — Medication 100 MILLIGRAM(S): at 05:12

## 2023-08-21 RX ADMIN — Medication 25 MILLIGRAM(S): at 05:11

## 2023-08-21 RX ADMIN — GABAPENTIN 100 MILLIGRAM(S): 400 CAPSULE ORAL at 18:00

## 2023-08-21 RX ADMIN — Medication 4 UNIT(S): at 17:57

## 2023-08-21 RX ADMIN — Medication 81 MILLIGRAM(S): at 18:01

## 2023-08-21 RX ADMIN — GABAPENTIN 100 MILLIGRAM(S): 400 CAPSULE ORAL at 22:39

## 2023-08-21 NOTE — PROGRESS NOTE ADULT - SUBJECTIVE AND OBJECTIVE BOX
[facility], Division of Infectious Diseases  [facility] Victor Y. Patel, S. Shah, G. Neftaly  [phone]  ([phone] - weekdays after 5pm and weekends)    Name: WILEY BLAKE  Age/Gender: 82y Male  MRN: [id number]    Interval History:  Notes reviewed.   No concerning overnight events.  Afebrile.   he is awaiting MRI of his shoulder waiting to get answers regarding his weakness    Allergies: No Known Allergies      Objective:  Vitals:   T(F): 97.9 (08-21-23 @ 05:10), Max: 98.4 (08-20-23 @ 14:30)  HR: 60 (08-21-23 @ 05:10) (60 - 112)  BP: 151/76 (08-21-23 @ 05:10) (122/80 - 151/76)  RR: 18 (08-21-23 @ 05:10) (17 - 18)  SpO2: 100% (08-21-23 @ 05:10) (99% - 100%)  Physical Examination:  General: no acute distress  HEENT: anicteric  Cardio: S1, S2, normal rate  Resp: clear to auscultation bilaterally  Abd: soft, NT, ND  Ext: decreased L shoulder flexion  Skin: warm, dry    Laboratory Studies:  CBC:   WBC Trend:  7.42 08-19-23 @ 07:00  8.51 08-18-23 @ 05:25  9.17 08-16-23 @ 06:30  9.42 08-14-23 @ 16:30    CMP:               Microbiology: reviewed       Radiology: reviewed     Medications:  acetaminophen     Tablet .. 650 milliGRAM(s) Oral every 6 hours PRN  amLODIPine   Tablet 10 milliGRAM(s) Oral daily  aspirin enteric coated 81 milliGRAM(s) Oral daily  atorvastatin 40 milliGRAM(s) Oral at bedtime  citalopram 20 milliGRAM(s) Oral daily  dextrose 5%. 1000 milliLiter(s) IV Continuous <Continuous>  dextrose 5%. 1000 milliLiter(s) IV Continuous <Continuous>  dextrose 50% Injectable 25 Gram(s) IV Push once  dextrose 50% Injectable 12.5 Gram(s) IV Push once  dextrose 50% Injectable 25 Gram(s) IV Push once  dextrose Oral Gel 15 Gram(s) Oral once PRN  doxycycline monohydrate Capsule 100 milliGRAM(s) Oral every 12 hours  enoxaparin Injectable 40 milliGRAM(s) SubCutaneous every 24 hours  finasteride 5 milliGRAM(s) Oral daily  gabapentin 100 milliGRAM(s) Oral three times a day  glucagon  Injectable 1 milliGRAM(s) IntraMuscular once  hydrALAZINE 25 milliGRAM(s) Oral three times a day  insulin glargine Injectable (LANTUS) 14 Unit(s) SubCutaneous at bedtime  insulin lispro (ADMELOG) corrective regimen sliding scale   SubCutaneous three times a day before meals  insulin lispro (ADMELOG) corrective regimen sliding scale   SubCutaneous at bedtime  insulin lispro Injectable (ADMELOG) 4 Unit(s) SubCutaneous three times a day before meals  melatonin 3 milliGRAM(s) Oral at bedtime PRN  metoprolol tartrate 25 milliGRAM(s) Oral two times a day  mupirocin 2% Ointment 1 Application(s) Topical two times a day  pantoprazole    Tablet 40 milliGRAM(s) Oral before breakfast  sodium chloride 0.9%. 1000 milliLiter(s) IV Continuous <Continuous>  tamsulosin 0.4 milliGRAM(s) Oral at bedtime  traMADol 25 milliGRAM(s) Oral every 8 hours PRN    Antimicrobials:  doxycycline monohydrate Capsule 100 milliGRAM(s) Oral every 12 hours

## 2023-08-21 NOTE — PROGRESS NOTE ADULT - SUBJECTIVE AND OBJECTIVE BOX
WILEY BLAKE  82y  Male      Patient is a 82y old  Male who presents with a chief complaint of left shoulder pain x2 days, abnl EKG (17 Aug 2023 10:34)  Patient was seen and examined.chart reviewed,c/o lt.shoulder area pain.no sob,no cp,no fever,no cough    REVIEW OF SYSTEMS:  CONSTITUTIONAL: No fever  RESPIRATORY: No cough, hemoptysis or shortness of breath  CARDIOVASCULAR: No chest pain, palpitations, dizziness, or leg swelling  GASTROINTESTINAL: No abdominal pain. nausea, vomiting, hematemesis  GENITOURINARY: No dysuria, frequency, hematuria   NEUROLOGICAL: Shoulder weakness PRESENT   MUSCULOSKELETAL: No joint pain or swelling;     T(C): 36.6 (08-21-23 @ 18:01), Max: 36.6 (08-21-23 @ 18:01)  HR: 64 (08-21-23 @ 18:01) (64 - 64)  BP: 142/78 (08-21-23 @ 18:01) (142/78 - 142/78)  RR: 18 (08-21-23 @ 18:01) (18 - 18)  SpO2: 100% (08-21-23 @ 18:01) (100% - 100%)      MEDICATIONS  (STANDING):  amLODIPine   Tablet 10 milliGRAM(s) Oral daily  aspirin enteric coated 81 milliGRAM(s) Oral daily  atorvastatin 40 milliGRAM(s) Oral at bedtime  citalopram 20 milliGRAM(s) Oral daily  dextrose 5%. 1000 milliLiter(s) (100 mL/Hr) IV Continuous <Continuous>  dextrose 5%. 1000 milliLiter(s) (50 mL/Hr) IV Continuous <Continuous>  dextrose 50% Injectable 12.5 Gram(s) IV Push once  dextrose 50% Injectable 25 Gram(s) IV Push once  dextrose 50% Injectable 25 Gram(s) IV Push once  enoxaparin Injectable 40 milliGRAM(s) SubCutaneous every 24 hours  finasteride 5 milliGRAM(s) Oral daily  gabapentin 100 milliGRAM(s) Oral three times a day  glucagon  Injectable 1 milliGRAM(s) IntraMuscular once  hydrALAZINE 25 milliGRAM(s) Oral three times a day  insulin glargine Injectable (LANTUS) 14 Unit(s) SubCutaneous at bedtime  insulin lispro (ADMELOG) corrective regimen sliding scale   SubCutaneous three times a day before meals  insulin lispro (ADMELOG) corrective regimen sliding scale   SubCutaneous at bedtime  insulin lispro Injectable (ADMELOG) 4 Unit(s) SubCutaneous three times a day before meals  metoprolol tartrate 25 milliGRAM(s) Oral two times a day  mupirocin 2% Ointment 1 Application(s) Topical two times a day  pantoprazole    Tablet 40 milliGRAM(s) Oral before breakfast  sodium chloride 0.9%. 1000 milliLiter(s) (75 mL/Hr) IV Continuous <Continuous>  tamsulosin 0.4 milliGRAM(s) Oral at bedtime    MEDICATIONS  (PRN):  acetaminophen     Tablet .. 650 milliGRAM(s) Oral every 6 hours PRN Temp greater or equal to 38C (100.4F), Mild Pain (1 - 3)  dextrose Oral Gel 15 Gram(s) Oral once PRN Blood Glucose LESS THAN 70 milliGRAM(s)/deciliter  melatonin 3 milliGRAM(s) Oral at bedtime PRN Insomnia  traMADol 25 milliGRAM(s) Oral every 8 hours PRN Severe Pain (7 - 10)                PHYSICAL EXAM:  GENERAL: Alert and awake lying in bed in no distress  HEAD:  Atraumatic, Normocephalic  EYES: EOMI, JOSE, conjunctiva and sclera clear  NECK: Supple, No JVD, Normal thyroid  NERVOUS SYSTEM:  Alert & Oriented X3, Motor and sensory systems are intact, lt.arm weakness  CHEST/LUNG: Bilateral clear breath sounds, no rhochi, no wheezing, no crepitations,  HEART: Regular rate and rhythm; No murmurs, rubs, or gallops  ABDOMEN: Soft, Nontender, Nondistended; Bowel sounds present  EXTREMITIES:   Peripheral Pulses are palpable, no  edema                                           13.7   7.16  )-----------( 223      ( 21 Aug 2023 09:57 )             39.4               138|107|27<155  4.0|23|1.24  8.6,1.90,3.0  08-21 @ 09:57

## 2023-08-21 NOTE — PROGRESS NOTE ADULT - ASSESSMENT
Patient is an 81 y/o M with a PMHx of T2DM (not on insulin), CAD s/p stents 2020, HTN, BPH, and MDD presenting with left shoulder pain x2 days, concerning for OA vs. adhesive capsulitis. Endocrinology consulted for management of Type 2 DM.    #Type 2 Diabetes Mellitus  A1c: 10.5% (prior A1c 9.9% in 3/2023)  Home Meds: metformin 1000mg BID, Jardiance 25mg once daily, Ozempic 0.25mg once weekly (has taken 3 doses so far)  Does not monitor blood sugars at home  - No clinical signs or lab findings concerning for DKA  -Glucose Target 100-180mg/dl: stable today   - c/w Lantus 14 units qhs  - c/w standing Admelog 4 units TID before meals  - c/w Admelog low correction scale TID before meals and low qhs scale  - c/w FS TID before meals and qhs  - Diet: consistent carb  - RD consult  - Hypoglycemia Protocol     Discharge Planning:  - Pt received 3x doses of 0.25mg Ozempic. Can d/c patient with 0.5mg Ozempic with instructions to start this dosage after he takes his 4th dose of 0.25mg.  - Trend eGFR to assess need for adjusting metformin dosage (if eGFR <45).   - Resume Jardiance 25mg po daily  - Given patient's age and recent initiation of Ozempic, can hold off on discharging patient on basal insulin  - Please call your doctor if your FS is 70 or below and or 250 and above   - Provide patient with glucometer, lancets, and test strips on discharge  - Pt can follow up with Endocrinology outpatient at [address] [place] [address], [address], [place] [address]. [phone].    #Hypertension  Home meds: amlodipine 10mg qd, losartan 50mg qd  - c/w losartan 100mg qd  - c/w metoprolol 25mg BID  - Titration as per primary team   HLD   LDL <70   Continue Lipitor 40mg p.o. qhs if no contraindications   Please obtain lipid profile as an outpt    Monique Montoya  Nurse Practitioner  Division of Endocrinology & Diabetes  In house pager #[phone]    If before 9AM or after 6PM, or on weekends/holidays, please call endocrine answering service for assistance ([phone]).For nonurgent matters email [email or web address] for assistance.

## 2023-08-21 NOTE — PROGRESS NOTE ADULT - ASSESSMENT
82M with PMHx CAD s/p stents 2020, HTN, DM2, BPH, MDD presenting with left shoulder pain x2 days. Patient notes lateral shoulder pain that is worse with sitting up and better with lying down. It is limiting his ability to raise his arms above his shoulder level. Nephrology consult called for PHILIP    Assessment:  1) Non oliguric PHILIP with improving renal function with S cr 1.2 likely pre-renal/dehydration vs ATN from renal hypoperfusion  2) Hypertensive nephropathy  3) Dm type II with diabetic neuropathy/nephropathy  4) BPH with LUTs  5)  Electrolytes disorder with Hypokalemia  6) MDD  7) Left shoulder pain/weakness    Recommend:  Strict I/o  Avoid Nephrotoxic agents  S cr 1.2  with non oliguria  Urinalysis and urine electrolytes reviewed  Bilateral renal ultrasound results reviewed without obstruction  IV/po hydration  Monitor BMP and electrolyte daily  Replete electrolytes with goal K>4 and <5, Mag> 2, phos 2.5 to 3.5   Continue Flomax 0.4 mg po daily  Optimal pain control  Optimal BP/DM Control  Can resume low dose ARB  Volume status and electrolytes noted  No RRT/HD  Await Left shoulder MR to assess left shoulder pain/weakness  Will follow

## 2023-08-21 NOTE — PROGRESS NOTE ADULT - SUBJECTIVE AND OBJECTIVE BOX
Chief Complaint: Type 2 Diabetes Mellitus    History: Pt seen at bedside. Pt tolerating oral diet. Pt denies nausea and vomiting/any signs of hypoglycemia. Pt reports an adequate appetite.     MEDICATIONS  (STANDING):  amLODIPine   Tablet 10 milliGRAM(s) Oral daily  aspirin enteric coated 81 milliGRAM(s) Oral daily  atorvastatin 40 milliGRAM(s) Oral at bedtime  citalopram 20 milliGRAM(s) Oral daily  dextrose 5%. 1000 milliLiter(s) (100 mL/Hr) IV Continuous <Continuous>  dextrose 5%. 1000 milliLiter(s) (50 mL/Hr) IV Continuous <Continuous>  dextrose 50% Injectable 12.5 Gram(s) IV Push once  dextrose 50% Injectable 25 Gram(s) IV Push once  dextrose 50% Injectable 25 Gram(s) IV Push once  enoxaparin Injectable 40 milliGRAM(s) SubCutaneous every 24 hours  finasteride 5 milliGRAM(s) Oral daily  gabapentin 100 milliGRAM(s) Oral three times a day  glucagon  Injectable 1 milliGRAM(s) IntraMuscular once  hydrALAZINE 25 milliGRAM(s) Oral three times a day  insulin glargine Injectable (LANTUS) 14 Unit(s) SubCutaneous at bedtime  insulin lispro (ADMELOG) corrective regimen sliding scale   SubCutaneous three times a day before meals  insulin lispro (ADMELOG) corrective regimen sliding scale   SubCutaneous at bedtime  insulin lispro Injectable (ADMELOG) 4 Unit(s) SubCutaneous three times a day before meals  metoprolol tartrate 25 milliGRAM(s) Oral two times a day  mupirocin 2% Ointment 1 Application(s) Topical two times a day  pantoprazole    Tablet 40 milliGRAM(s) Oral before breakfast  sodium chloride 0.9%. 1000 milliLiter(s) (75 mL/Hr) IV Continuous <Continuous>  tamsulosin 0.4 milliGRAM(s) Oral at bedtime    MEDICATIONS  (PRN):  acetaminophen     Tablet .. 650 milliGRAM(s) Oral every 6 hours PRN Temp greater or equal to 38C (100.4F), Mild Pain (1 - 3)  dextrose Oral Gel 15 Gram(s) Oral once PRN Blood Glucose LESS THAN 70 milliGRAM(s)/deciliter  melatonin 3 milliGRAM(s) Oral at bedtime PRN Insomnia  traMADol 25 milliGRAM(s) Oral every 8 hours PRN Severe Pain (7 - 10)      Allergies: No Known Allergies    Review of Systems:  Respiratory: No SOB, no cough  GI: No nausea, vomiting, abdominal pain  Endocrine: no polyuria, polydipsia      PHYSICAL EXAM:  VITALS: T(C): 36.4 (08-21-23 @ 09:17)  T(F): 97.5 (08-21-23 @ 09:17), Max: 97.9 (08-20-23 @ 17:30)  HR: 58 (08-21-23 @ 09:17) (58 - 64)  BP: 115/69 (08-21-23 @ 09:17) (115/69 - 151/76)  RR:  (17 - 18)  SpO2:  (98% - 100%)  Wt(kg): --  GENERAL: NAD, well-groomed, well-developed  RESPIRATORY: No labored breathing   GI: Soft, nontender, non distended  PSYCH: Alert and oriented x 3, normal affect, normal mood      CAPILLARY BLOOD GLUCOSE  POCT Blood Glucose.: 107 mg/dL (21 Aug 2023 08:37)  POCT Blood Glucose.: 117 mg/dL (20 Aug 2023 21:17)  POCT Blood Glucose.: 124 mg/dL (20 Aug 2023 17:31)    A1C with Estimated Average Glucose (08.15.23 @ 02:00)    A1C with Estimated Average Glucose Result: 10.5   Estimated Average Glucose: 255      08-21    138  |  107  |  27<H>  ----------------------------<  155<H>  4.0   |  23  |  1.24    eGFR: 58<L>    Ca    8.6      08-21  Mg     1.90     08-21  Phos  3.0     08-21    TPro  x   /  Alb  3.0<L>  /  TBili  x   /  DBili  x   /  AST  x   /  ALT  x   /  AlkPhos  x   08-19      Thyroid Function Tests:  08-15 @ 02:00 TSH 2.72 FreeT4 -- T3 -- Anti TPO -- Anti Thyroglobulin Ab -- TSI --      Diet, DASH/TLC:   Sodium & Cholesterol Restricted  Consistent Carbohydrate No Snacks (CSTCHO) (08-16-23 @ 15:46) [Active]

## 2023-08-21 NOTE — PROGRESS NOTE ADULT - ASSESSMENT
82m cad, hypertension, hyperlipid diabetes who presented w/ L shoulder weakness. Hospital course c/b PHILIP    paronychia s/p i and d  plan  doxycycline 100 bid x 7 day course   - was started 8/14 AM so last dose 8/21 AM   ua with yeast but no pyuria and patient asymptomatic; antifungals not indicated   MRI L shoulder pending    Sven Higginbotham M.D.  [facility], Division of Infectious Diseases  [phone]  After 5pm on weekdays and all day on weekends - please call [phone]

## 2023-08-21 NOTE — PROGRESS NOTE ADULT - SUBJECTIVE AND OBJECTIVE BOX
Best Wong MD (Nephrology progress note)  [address] [facility] [place],  [address],  [facility]  TEl: [id number]  Cell: [phone]    Patient is a 82y Male seen and evaluated at bedside. Vital signs, laboratory data, imaging studies, consult notes reviewed done within past 24 hours. Overnight events noted.    Allergies    No Known Allergies    Intolerances        ROS:  CONSTITUTIONAL: No fever or chills.  HEENT: No headcahe or visual disturbances.  RESPIRATORY: No shortness of breath, cough, hemoptysis or wheezing  CARDIOVASCULAR: No Chest pain, shortness of breath, palpitations, dizziness, syncope, orthopnea, PND or leg swelling.  GASTROINTESTINAL: No abdominal pain, nausea, vomiting, diarrhea, hematemesis or blood per rectum.  GENITOURINARY: No urinary frequency, urgency, gross hematuria, dysuria, flank or supra pubic pain, difficulty passing urine.  NEUROLOGICAL: No headache, focal limb weakness, tingling or numbness or seizure like activity  SKIN: No skin rash or lesion  MUSCULOSKELETAL: No leg pain, calf pain or swelling    VITALS:    T(C): 36.6 (08-21-23 @ 05:10), Max: 36.9 (08-20-23 @ 14:30)  HR: 60 (08-21-23 @ 05:10) (60 - 112)  BP: 151/76 (08-21-23 @ 05:10) (122/80 - 151/76)  RR: 18 (08-21-23 @ 05:10) (17 - 18)  SpO2: 100% (08-21-23 @ 05:10) (99% - 100%)  CAPILLARY BLOOD GLUCOSE      POCT Blood Glucose.: 107 mg/dL (21 Aug 2023 08:37)  POCT Blood Glucose.: 117 mg/dL (20 Aug 2023 21:17)  POCT Blood Glucose.: 124 mg/dL (20 Aug 2023 17:31)  POCT Blood Glucose.: 120 mg/dL (20 Aug 2023 12:25)      08-20-23 @ 07:01  -  08-21-23 @ 07:00  --------------------------------------------------------  IN: 0 mL / OUT: 625 mL / NET: -625 mL      MEDICATIONS  (STANDING):  amLODIPine   Tablet 10 milliGRAM(s) Oral daily  aspirin enteric coated 81 milliGRAM(s) Oral daily  atorvastatin 40 milliGRAM(s) Oral at bedtime  citalopram 20 milliGRAM(s) Oral daily  dextrose 5%. 1000 milliLiter(s) (100 mL/Hr) IV Continuous <Continuous>  dextrose 5%. 1000 milliLiter(s) (50 mL/Hr) IV Continuous <Continuous>  dextrose 50% Injectable 12.5 Gram(s) IV Push once  dextrose 50% Injectable 25 Gram(s) IV Push once  dextrose 50% Injectable 25 Gram(s) IV Push once  doxycycline monohydrate Capsule 100 milliGRAM(s) Oral every 12 hours  enoxaparin Injectable 40 milliGRAM(s) SubCutaneous every 24 hours  finasteride 5 milliGRAM(s) Oral daily  gabapentin 100 milliGRAM(s) Oral three times a day  glucagon  Injectable 1 milliGRAM(s) IntraMuscular once  hydrALAZINE 25 milliGRAM(s) Oral three times a day  insulin glargine Injectable (LANTUS) 14 Unit(s) SubCutaneous at bedtime  insulin lispro (ADMELOG) corrective regimen sliding scale   SubCutaneous three times a day before meals  insulin lispro (ADMELOG) corrective regimen sliding scale   SubCutaneous at bedtime  insulin lispro Injectable (ADMELOG) 4 Unit(s) SubCutaneous three times a day before meals  metoprolol tartrate 25 milliGRAM(s) Oral two times a day  mupirocin 2% Ointment 1 Application(s) Topical two times a day  pantoprazole    Tablet 40 milliGRAM(s) Oral before breakfast  sodium chloride 0.9%. 1000 milliLiter(s) (75 mL/Hr) IV Continuous <Continuous>  tamsulosin 0.4 milliGRAM(s) Oral at bedtime    MEDICATIONS  (PRN):  acetaminophen     Tablet .. 650 milliGRAM(s) Oral every 6 hours PRN Temp greater or equal to 38C (100.4F), Mild Pain (1 - 3)  dextrose Oral Gel 15 Gram(s) Oral once PRN Blood Glucose LESS THAN 70 milliGRAM(s)/deciliter  melatonin 3 milliGRAM(s) Oral at bedtime PRN Insomnia  traMADol 25 milliGRAM(s) Oral every 8 hours PRN Severe Pain (7 - 10)      PHYSICAL EXAM:  GENERAL: Alert, awake and oriented x3 in no distress  HEENT: JOSE, EOMI, neck supple, no JVP, no carotid bruit, oral mucosa moist and pink.  CHEST/LUNG: Bilateral clear breath sounds, no rales, no crepitations, no rhonchi, no wheezing  HEART: Regular rate and rhythm, ALIDA II/VI at LPSB, no gallops, no rub   ABDOMEN: Soft, nontender, non distended, bowel sounds present, no palpable organomegaly  : No flank or supra pubic tenderness.  EXTREMITIES: Peripheral pulses are palpable, no pedal edema  Neurology: AAOx3, no focal neurological deficit  SKIN: No rash or skin lesion  Musculoskeletal: No joint deformity or spinal tenderness.      Vascular ACCESS:     LABS:                      RADIOLOGY & ADDITIONAL STUDIES:    Imaging Personally Reviewed:  [x] YES  [ ] NO    Consultant(s) Notes Reviewed:  [x] YES  [ ] NO    Care Discussed with Primary team/ Other Providers [x] YES  [ ] NO       Dwight Wong MD (Nephrology progress note)  [address], [facility],  [address],  [facility]  TEl: [id number]  Cell: [phone]    Patient is a 82y Male seen and evaluated at bedside. Vital signs, laboratory data, imaging studies, consult notes reviewed done within past 24 hours. Overnight events noted. Patient lying in bed alert and awake in no distress. Feels better but still complains of left shoulder pain and weakness. Interval improving renal function with S cr 1.2 with non oliguria.     Allergies    No Known Allergies    Intolerances        ROS:  CONSTITUTIONAL: No fever or chills.  HEENT: No headache or visual disturbances.  RESPIRATORY: No shortness of breath, cough, hemoptysis or wheezing  CARDIOVASCULAR: No Chest pain or SOB  GASTROINTESTINAL: No abdominal pain, nausea, vomiting, diarrhea, hematemesis or blood per rectum.  GENITOURINARY: No urinary frequency, urgency, gross hematuria, dysuria, flank or supra pubic pain, difficulty passing urine.  NEUROLOGICAL: No headache, focal limb weakness, tingling or numbness or seizure like activity  SKIN: No skin rash or lesion  MUSCULOSKELETAL: No flank or supra pubic pain    VITALS:    T(C): 36.6 (08-21-23 @ 05:10), Max: 36.9 (08-20-23 @ 14:30)  HR: 60 (08-21-23 @ 05:10) (60 - 112)  BP: 151/76 (08-21-23 @ 05:10) (122/80 - 151/76)  RR: 18 (08-21-23 @ 05:10) (17 - 18)  SpO2: 100% (08-21-23 @ 05:10) (99% - 100%)  CAPILLARY BLOOD GLUCOSE      POCT Blood Glucose.: 107 mg/dL (21 Aug 2023 08:37)  POCT Blood Glucose.: 117 mg/dL (20 Aug 2023 21:17)  POCT Blood Glucose.: 124 mg/dL (20 Aug 2023 17:31)  POCT Blood Glucose.: 120 mg/dL (20 Aug 2023 12:25)      08-20-23 @ 07:01  -  08-21-23 @ 07:00  --------------------------------------------------------  IN: 0 mL / OUT: 625 mL / NET: -625 mL      MEDICATIONS  (STANDING):  amLODIPine   Tablet 10 milliGRAM(s) Oral daily  aspirin enteric coated 81 milliGRAM(s) Oral daily  atorvastatin 40 milliGRAM(s) Oral at bedtime  citalopram 20 milliGRAM(s) Oral daily  dextrose 5%. 1000 milliLiter(s) (100 mL/Hr) IV Continuous <Continuous>  dextrose 5%. 1000 milliLiter(s) (50 mL/Hr) IV Continuous <Continuous>  dextrose 50% Injectable 12.5 Gram(s) IV Push once  dextrose 50% Injectable 25 Gram(s) IV Push once  dextrose 50% Injectable 25 Gram(s) IV Push once  doxycycline monohydrate Capsule 100 milliGRAM(s) Oral every 12 hours  enoxaparin Injectable 40 milliGRAM(s) SubCutaneous every 24 hours  finasteride 5 milliGRAM(s) Oral daily  gabapentin 100 milliGRAM(s) Oral three times a day  glucagon  Injectable 1 milliGRAM(s) IntraMuscular once  hydrALAZINE 25 milliGRAM(s) Oral three times a day  insulin glargine Injectable (LANTUS) 14 Unit(s) SubCutaneous at bedtime  insulin lispro (ADMELOG) corrective regimen sliding scale   SubCutaneous three times a day before meals  insulin lispro (ADMELOG) corrective regimen sliding scale   SubCutaneous at bedtime  insulin lispro Injectable (ADMELOG) 4 Unit(s) SubCutaneous three times a day before meals  metoprolol tartrate 25 milliGRAM(s) Oral two times a day  mupirocin 2% Ointment 1 Application(s) Topical two times a day  pantoprazole    Tablet 40 milliGRAM(s) Oral before breakfast  sodium chloride 0.9%. 1000 milliLiter(s) (75 mL/Hr) IV Continuous <Continuous>  tamsulosin 0.4 milliGRAM(s) Oral at bedtime    MEDICATIONS  (PRN):  acetaminophen     Tablet .. 650 milliGRAM(s) Oral every 6 hours PRN Temp greater or equal to 38C (100.4F), Mild Pain (1 - 3)  dextrose Oral Gel 15 Gram(s) Oral once PRN Blood Glucose LESS THAN 70 milliGRAM(s)/deciliter  melatonin 3 milliGRAM(s) Oral at bedtime PRN Insomnia  traMADol 25 milliGRAM(s) Oral every 8 hours PRN Severe Pain (7 - 10)      PHYSICAL EXAM:  GENERAL: Alert, awake and oriented x3 in no distress  HEENT: JOSE, EOMI, neck supple, no JVP  CHEST/LUNG: Bilateral clear breath sounds, no rales, no crepitations, no rhonchi, no wheezing  HEART: Regular rate and rhythm, ALIDA II/VI at LPSB, no gallops, no rub   ABDOMEN: Soft, nontender, non distended, bowel sounds present, no palpable organomegaly  : No flank or supra pubic tenderness.  EXTREMITIES: Peripheral pulses are palpable, no pedal edema  Neurology: AAOx3, no focal neurological deficit  SKIN: No rash or skin lesion  Musculoskeletal: No joint deformity or spinal tenderness.      Vascular ACCESS: None    LABS:                      RADIOLOGY & ADDITIONAL STUDIES:  < from: US Kidney and Bladder (08.18.23 @ 12:37) >    ACC: [id number] EXAM:  US KIDNEYS AND BLADDER   ORDERED BY: DWIGHT WONG     PROCEDURE DATE:  08/18/2023          INTERPRETATION:  CLINICAL INFORMATION: Acute renal insufficiency.    COMPARISON: CT 3/28/2008.    TECHNIQUE: Sonography of the kidneys and bladder.    FINDINGS:  Right kidney: 9.9 cm. No renal mass, hydronephrosis or calculi.    Left kidney: 10.3 cm. No renal mass, hydronephrosis or calculi.    Urinary bladder: Within normal limits.  Incidental enlarged prostate gland measuring 118 cc.It is noted that   this exam is not tailored to evaluate the prostate gland.    IMPRESSION:  No renal abnormality.        --- End of Report ---            BARBIE HAIR MD; Attending Radiologist  This document has been electronically signed. Aug 18 84317:02PM    < end of copied text >    Imaging Personally Reviewed:  [x] YES  [ ] NO    Consultant(s) Notes Reviewed:  [x] YES  [ ] NO    Care Discussed with Primary team/ Other Providers [x] YES  [ ] NO

## 2023-08-22 DIAGNOSIS — M48.02 SPINAL STENOSIS, CERVICAL REGION: ICD-10-CM

## 2023-08-22 LAB
ANION GAP SERPL CALC-SCNC: 8 MMOL/L — SIGNIFICANT CHANGE UP (ref 7–14)
BUN SERPL-MCNC: 24 MG/DL — HIGH (ref 7–23)
CALCIUM SERPL-MCNC: 8.6 MG/DL — SIGNIFICANT CHANGE UP (ref 8.4–10.5)
CHLORIDE SERPL-SCNC: 107 MMOL/L — SIGNIFICANT CHANGE UP (ref 98–107)
CO2 SERPL-SCNC: 25 MMOL/L — SIGNIFICANT CHANGE UP (ref 22–31)
CREAT SERPL-MCNC: 1.22 MG/DL — SIGNIFICANT CHANGE UP (ref 0.5–1.3)
EGFR: 60 ML/MIN/1.73M2 — SIGNIFICANT CHANGE UP
GLUCOSE BLDC GLUCOMTR-MCNC: 100 MG/DL — HIGH (ref 70–99)
GLUCOSE BLDC GLUCOMTR-MCNC: 133 MG/DL — HIGH (ref 70–99)
GLUCOSE BLDC GLUCOMTR-MCNC: 139 MG/DL — HIGH (ref 70–99)
GLUCOSE BLDC GLUCOMTR-MCNC: 143 MG/DL — HIGH (ref 70–99)
GLUCOSE BLDC GLUCOMTR-MCNC: 97 MG/DL — SIGNIFICANT CHANGE UP (ref 70–99)
GLUCOSE SERPL-MCNC: 95 MG/DL — SIGNIFICANT CHANGE UP (ref 70–99)
HCT VFR BLD CALC: 39.4 % — SIGNIFICANT CHANGE UP (ref 39–50)
HGB BLD-MCNC: 13.7 G/DL — SIGNIFICANT CHANGE UP (ref 13–17)
MAGNESIUM SERPL-MCNC: 2 MG/DL — SIGNIFICANT CHANGE UP (ref 1.6–2.6)
MCHC RBC-ENTMCNC: 30.6 PG — SIGNIFICANT CHANGE UP (ref 27–34)
MCHC RBC-ENTMCNC: 34.8 GM/DL — SIGNIFICANT CHANGE UP (ref 32–36)
MCV RBC AUTO: 87.9 FL — SIGNIFICANT CHANGE UP (ref 80–100)
NRBC # BLD: 0 /100 WBCS — SIGNIFICANT CHANGE UP (ref 0–0)
NRBC # FLD: 0 K/UL — SIGNIFICANT CHANGE UP (ref 0–0)
PHOSPHATE SERPL-MCNC: 3.4 MG/DL — SIGNIFICANT CHANGE UP (ref 2.5–4.5)
PLATELET # BLD AUTO: 226 K/UL — SIGNIFICANT CHANGE UP (ref 150–400)
POTASSIUM SERPL-MCNC: 3.7 MMOL/L — SIGNIFICANT CHANGE UP (ref 3.5–5.3)
POTASSIUM SERPL-SCNC: 3.7 MMOL/L — SIGNIFICANT CHANGE UP (ref 3.5–5.3)
RBC # BLD: 4.48 M/UL — SIGNIFICANT CHANGE UP (ref 4.2–5.8)
RBC # FLD: 12.3 % — SIGNIFICANT CHANGE UP (ref 10.3–14.5)
SODIUM SERPL-SCNC: 140 MMOL/L — SIGNIFICANT CHANGE UP (ref 135–145)
WBC # BLD: 7.18 K/UL — SIGNIFICANT CHANGE UP (ref 3.8–10.5)
WBC # FLD AUTO: 7.18 K/UL — SIGNIFICANT CHANGE UP (ref 3.8–10.5)

## 2023-08-22 PROCEDURE — 93010 ELECTROCARDIOGRAM REPORT: CPT

## 2023-08-22 PROCEDURE — 99232 SBSQ HOSP IP/OBS MODERATE 35: CPT

## 2023-08-22 PROCEDURE — 99221 1ST HOSP IP/OBS SF/LOW 40: CPT

## 2023-08-22 RX ORDER — LOSARTAN POTASSIUM 100 MG/1
50 TABLET, FILM COATED ORAL DAILY
Refills: 0 | Status: DISCONTINUED | OUTPATIENT
Start: 2023-08-22 | End: 2023-08-23

## 2023-08-22 RX ORDER — METOPROLOL TARTRATE 50 MG
12.5 TABLET ORAL
Refills: 0 | Status: DISCONTINUED | OUTPATIENT
Start: 2023-08-22 | End: 2023-08-23

## 2023-08-22 RX ADMIN — FINASTERIDE 5 MILLIGRAM(S): 5 TABLET, FILM COATED ORAL at 12:00

## 2023-08-22 RX ADMIN — INSULIN GLARGINE 14 UNIT(S): 100 INJECTION, SOLUTION SUBCUTANEOUS at 22:53

## 2023-08-22 RX ADMIN — Medication 4 UNIT(S): at 17:56

## 2023-08-22 RX ADMIN — Medication 25 MILLIGRAM(S): at 21:49

## 2023-08-22 RX ADMIN — Medication 81 MILLIGRAM(S): at 12:00

## 2023-08-22 RX ADMIN — GABAPENTIN 100 MILLIGRAM(S): 400 CAPSULE ORAL at 06:26

## 2023-08-22 RX ADMIN — GABAPENTIN 100 MILLIGRAM(S): 400 CAPSULE ORAL at 14:30

## 2023-08-22 RX ADMIN — TAMSULOSIN HYDROCHLORIDE 0.4 MILLIGRAM(S): 0.4 CAPSULE ORAL at 21:50

## 2023-08-22 RX ADMIN — CITALOPRAM 20 MILLIGRAM(S): 10 TABLET, FILM COATED ORAL at 12:00

## 2023-08-22 RX ADMIN — Medication 25 MILLIGRAM(S): at 14:30

## 2023-08-22 RX ADMIN — Medication 4 UNIT(S): at 09:09

## 2023-08-22 RX ADMIN — MUPIROCIN 1 APPLICATION(S): 20 OINTMENT TOPICAL at 09:10

## 2023-08-22 RX ADMIN — Medication 25 MILLIGRAM(S): at 06:25

## 2023-08-22 RX ADMIN — Medication 4 UNIT(S): at 12:30

## 2023-08-22 RX ADMIN — GABAPENTIN 100 MILLIGRAM(S): 400 CAPSULE ORAL at 21:48

## 2023-08-22 RX ADMIN — PANTOPRAZOLE SODIUM 40 MILLIGRAM(S): 20 TABLET, DELAYED RELEASE ORAL at 06:25

## 2023-08-22 RX ADMIN — AMLODIPINE BESYLATE 10 MILLIGRAM(S): 2.5 TABLET ORAL at 06:25

## 2023-08-22 RX ADMIN — MUPIROCIN 1 APPLICATION(S): 20 OINTMENT TOPICAL at 17:52

## 2023-08-22 RX ADMIN — Medication 12.5 MILLIGRAM(S): at 17:56

## 2023-08-22 RX ADMIN — ATORVASTATIN CALCIUM 40 MILLIGRAM(S): 80 TABLET, FILM COATED ORAL at 21:49

## 2023-08-22 NOTE — PROGRESS NOTE ADULT - PROBLEM SELECTOR PLAN 3
-180s, -120s  -med rec needs to be completed  -per surescripts on amlodipine 10mg qd and losartan 50mg qd potentially  ---resume amlodipine 10mg qd and increase to losartan 100mg qd  -IV hydral 2.5mg now  -resume metoprolol - confirm if on    Hydralazine added
S/p I&D by ED. no wound culture sent. No drainage at this time during my evaluation  -doxycycline 100mg BID  -mupirocin
S/p I&D by ED. no wound culture sent. No drainage at this time during my evaluation  -doxycycline 100mg BID  -mupirocin
Continue Hydralazine, Norvasc and Losartan
S/p I&D by ED. no wound culture sent. No drainage at this time during my evaluation  -doxycycline 100mg BID  -mupirocin  -advised to return to ED if worsens or not improved in the next several days  -soak with warm water
S/p I&D by ED. no wound culture sent. No drainage at this time during my evaluation  -doxycycline 100mg BID  -mupirocin
Continue Hydralazine, Norvasc and Losartan
S/p I&D by ED. no wound culture sent. No drainage at this time during my evaluation  -doxycycline 100mg BID  -mupirocin

## 2023-08-22 NOTE — PROGRESS NOTE ADULT - PROBLEM SELECTOR PLAN 1
Atraumatic left shoulder pain. No swelling, erythema, signs of trauma or drainage. Limited ROM 2/2 pain. Possible OA vs adhesive capsulitis  -MRI L shoulder ordered  -Ttylenol PRN, tramadol PRN  -Xray L shoulder: No acute fracture or dislocation.  -denies CP  MRI shows Cervical stenosis   Neuro Sx eval   POSITIVE UA Yeast ID follow up recommend no antifungals    Bradycardia Patient asymptomatic   Lower Beta Blockers
Atraumatic left shoulder pain. No swelling, erythema, signs of trauma or drainage. Limited ROM 2/2 pain. Possible OA vs adhesive capsulitis  -MRI L shoulder ordered  -ortho f/u as outpatient vs inpatient (if not improved with pain control)  -OT eval  -tylenol PRN, tramadol PRN  -Xray L shoulder: No acute fracture or dislocation.  -denies CP
Atraumatic left shoulder pain. No swelling, erythema, signs of trauma or drainage. Limited ROM 2/2 pain. Possible OA vs adhesive capsulitis  -MRI L shoulder ordered  -Ttylenol PRN, tramadol PRN  -Xray L shoulder: No acute fracture or dislocation.  -denies CP  MRI Left Shoulder-pending
Atraumatic left shoulder pain. No swelling, erythema, signs of trauma or drainage. Limited ROM 2/2 pain. Possible OA vs adhesive capsulitis  -MRI L shoulder ordered  -Ttylenol PRN, tramadol PRN  -Xray L shoulder: No acute fracture or dislocation.  -denies CP  MRI Left Shoulder-pending    POSITIVE UA Yeast ID follow up
Atraumatic left shoulder pain. No swelling, erythema, signs of trauma or drainage. Limited ROM 2/2 pain. Possible OA vs adhesive capsulitis  -MRI L shoulder ordered  -Ttylenol PRN, tramadol PRN  -Xray L shoulder: No acute fracture or dislocation.  -denies CP  MRI Left Shoulder
Atraumatic left shoulder pain. No swelling, erythema, signs of trauma or drainage. Limited ROM 2/2 pain. Possible OA vs adhesive capsulitis  -MRI L shoulder ordered  -Ttylenol PRN, tramadol PRN  -Xray L shoulder: No acute fracture or dislocation.  -denies CP  MRI Left Shoulder-pending
Atraumatic left shoulder pain. No swelling, erythema, signs of trauma or drainage. Limited ROM 2/2 pain. Possible OA vs adhesive capsulitis  -MRI L shoulder ordered  -Ttylenol PRN, tramadol PRN  -Xray L shoulder: No acute fracture or dislocation.  -denies CP  MRI Left Shoulder-pending    POSITIVE UA Yeast ID follow up recommend no antifungals
Atraumatic left shoulder pain. No swelling, erythema, signs of trauma or drainage. Limited ROM 2/2 pain. Possible OA vs adhesive capsulitis  -MRI L shoulder ordered  -Ttylenol PRN, tramadol PRN  -Xray L shoulder: No acute fracture or dislocation.  -denies CP  MRI Left Shoulder-pending    POSITIVE UA Yeast ID follow up

## 2023-08-22 NOTE — PROGRESS NOTE ADULT - SUBJECTIVE AND OBJECTIVE BOX
[facility], Division of Infectious Diseases  [facility], [place] [place], [facility]  [phone]  ([phone] - weekdays after 5pm and weekends)    Name: WILEY BLAKE  Age/Gender: 82y Male  MRN: [id number]    Interval History:  Notes reviewed.   No concerning overnight events.  Afebrile.   denies finger pain, abd pain or dysuria  he is concerned about his shoulder weakness    Allergies: No Known Allergies      Objective:  Vitals:   T(F): 98 (08-22-23 @ 06:15), Max: 98.5 (08-22-23 @ 01:33)  HR: 66 (08-22-23 @ 06:15) (61 - 66)  BP: 147/64 (08-22-23 @ 06:15) (134/76 - 147/64)  RR: 18 (08-22-23 @ 06:15) (18 - 18)  SpO2: 97% (08-22-23 @ 06:15) (96% - 100%)  Physical Examination:  General: no acute distress  HEENT: anicteric  Cardio: S1, S2, normal rate  Resp: clear to auscultation bilaterally  Abd: soft, NT, ND  Ext: decreased deltoid strength  Skin: warm, dry    Laboratory Studies:  CBC:                       13.7   7.18  )-----------( 226      ( 22 Aug 2023 07:36 )             39.4     WBC Trend:  7.18 08-22-23 @ 07:36  7.16 08-21-23 @ 09:57  7.42 08-19-23 @ 07:00  8.51 08-18-23 @ 05:25  9.17 08-16-23 @ 06:30    CMP: 08-22    140  |  107  |  24<H>  ----------------------------<  95  3.7   |  25  |  1.22    Ca    8.6      22 Aug 2023 07:36  Phos  3.4     08-22  Mg     2.00     08-22            Urinalysis Basic - ( 22 Aug 2023 07:36 )    Color: x / Appearance: x / SG: x / pH: x  Gluc: 95 mg/dL / Ketone: x  / Bili: x / Urobili: x   Blood: x / Protein: x / Nitrite: x   Leuk Esterase: x / RBC: x / WBC x   Sq Epi: x / Non Sq Epi: x / Bacteria: x      Microbiology: reviewed       Radiology: reviewed     Medications:  acetaminophen     Tablet .. 650 milliGRAM(s) Oral every 6 hours PRN  amLODIPine   Tablet 10 milliGRAM(s) Oral daily  aspirin enteric coated 81 milliGRAM(s) Oral daily  atorvastatin 40 milliGRAM(s) Oral at bedtime  citalopram 20 milliGRAM(s) Oral daily  dextrose 5%. 1000 milliLiter(s) IV Continuous <Continuous>  dextrose 5%. 1000 milliLiter(s) IV Continuous <Continuous>  dextrose 50% Injectable 12.5 Gram(s) IV Push once  dextrose 50% Injectable 25 Gram(s) IV Push once  dextrose 50% Injectable 25 Gram(s) IV Push once  dextrose Oral Gel 15 Gram(s) Oral once PRN  enoxaparin Injectable 40 milliGRAM(s) SubCutaneous every 24 hours  finasteride 5 milliGRAM(s) Oral daily  gabapentin 100 milliGRAM(s) Oral three times a day  glucagon  Injectable 1 milliGRAM(s) IntraMuscular once  hydrALAZINE 25 milliGRAM(s) Oral three times a day  insulin glargine Injectable (LANTUS) 14 Unit(s) SubCutaneous at bedtime  insulin lispro (ADMELOG) corrective regimen sliding scale   SubCutaneous three times a day before meals  insulin lispro (ADMELOG) corrective regimen sliding scale   SubCutaneous at bedtime  insulin lispro Injectable (ADMELOG) 4 Unit(s) SubCutaneous three times a day before meals  losartan 50 milliGRAM(s) Oral daily  melatonin 3 milliGRAM(s) Oral at bedtime PRN  metoprolol tartrate 12.5 milliGRAM(s) Oral two times a day  mupirocin 2% Ointment 1 Application(s) Topical two times a day  pantoprazole    Tablet 40 milliGRAM(s) Oral before breakfast  sodium chloride 0.9%. 1000 milliLiter(s) IV Continuous <Continuous>  tamsulosin 0.4 milliGRAM(s) Oral at bedtime    Antimicrobials:

## 2023-08-22 NOTE — PROGRESS NOTE ADULT - PROBLEM SELECTOR PLAN 5
Per surescripts appears to be on jardiance, ozempic and metformin - hold inpatient  -A1C 10.5  Lantus HISS   -endocrine following
S/p CARMEN to pRCA and LCx in 2020  -cards c/s  -resume ASA, statin  -confirm if on metoprolol  -confirm if on plavix - patient unsure. It appears to have been >2 years since last stents so will hold off for now
Per surescripts appears to be on jardiance, ozempic and metformin - hold inpatient  -A1C 10.5  Lantus HISS   -endocrine following
S/p CARMEN to pRCA and LCx in 2020  -cards c/s  -resume ASA, statin  -confirm if on metoprolol  -confirm if on plavix - patient unsure. It appears to have been >2 years since last stents so will hold off for now
Per surescripts appears to be on jardiance, ozempic and metformin - hold inpatient  -A1C 10.5  Lantus HISS   -endocrine following
S/p CARMEN to pRCA and LCx in 2020  -cards c/s  -resume ASA, statin  -confirm if on metoprolol  -confirm if on plavix - patient unsure. It appears to have been >2 years since last stents so will hold off for now

## 2023-08-22 NOTE — PROGRESS NOTE ADULT - PROBLEM SELECTOR PROBLEM 6
MDD (major depressive disorder)
Type 2 diabetes mellitus with hyperglycemia, without long-term current use of insulin

## 2023-08-22 NOTE — PROGRESS NOTE ADULT - PROBLEM SELECTOR PLAN 2
Continue Hydralazine, Norvasc and Losartan  D/C Losartan PHILIP     PHILIP Renal consulted
Denies CP, SOB. +L left shoulder pain worse with movement. Likely MSK related  -EKG sinus arrhythmia with 1st degree AV block. No syncope  -tele  -echo      Demand ischemia   Cards Consulted
Continue Hydralazine, Norvasc and Losartan  D/C Losartan PHILIP     PHILIP Renal consulted
Continue Hydralazine, Norvasc and Losartan  D/C Losartan PHILIP     PHILIP Renal consult called
Denies CP, SOB. +L left shoulder pain worse with movement. Likely MSK related  -EKG sinus arrhythmia with 1st degree AV block. No syncope  -tele  -echo      Demand ischemia   Cards consult called
Denies CP, SOB. +L left shoulder pain worse with movement. Likely MSK related  -EKG sinus arrhythmia with 1st degree AV block. No syncope  -tele  -echo      Demand ischemia   Cards Consulted
Continue Hydralazine, Norvasc and Losartan  D/C Losartan PHILIP     PHILIP Renal consulted
Continue Hydralazine, Norvasc and Losartan  D/C Losartan PHILIP     PHILIP Renal consulted

## 2023-08-22 NOTE — CHART NOTE - NSCHARTNOTEFT_GEN_A_CORE
Orthopedics called for high grade tear of the left supraspinatus. Recommend outpatient follow up with Dr. Peña, Dr. Helm, or Dr. Lainez. Please call [phone] to make an appointment.

## 2023-08-22 NOTE — PROGRESS NOTE ADULT - PROBLEM SELECTOR PROBLEM 8
BPH (benign prostatic hyperplasia)
Need for prophylactic measure
Need for prophylactic measure
BPH (benign prostatic hyperplasia)
Need for prophylactic measure
BPH (benign prostatic hyperplasia)

## 2023-08-22 NOTE — CONSULT NOTE ADULT - SUBJECTIVE AND OBJECTIVE BOX
NEUROSURGERY CONSULT    Consulted for: cervical HNP    HPI: 82M with PMHx CAD s/p stents 2020, HTN, DM2, BPH, MDD presenting with left shoulder pain x2 days. Patient notes lateral shoulder pain that is worse with sitting up and better with lying down. It is limiting his ability to raise his arms above his shoulder level. He denies recent trauma, cuts, procedures to this joint. There is no swelling, erythema as well and denies arthritis in this joint as well. He went to see his PMD today and out of concern for an abnormal EKG he was sent to the ED for evaluation. He denies having any chest pain (despite ED provider note mentioning this). He denies fevers, chills, cough, SOB, palpitations, LH, LOC, abdominal pain, vomiting, diarrhea, LE swelling. Patient does not know all his medications. In 2020 he had CARMEN x2 placed to LCx and pRCA. At the time he was placed on DAPT. He is unsure if he is still on plavix but notes taking baby ASA every day. On surescripts plavix is not listed. He denies exertional CP or COOPER at this time. He believes he is on amlodipine, ASA, atorvastatin, citalopram, metformin when mentioned to him but he is unsure    Of note in ED L 3rd digit paronychia found and s/p I&D (14 Aug 2023 22:52)      RADIOLOGY:   < from: MR Cervical Spine No Cont (08.21.23 @ 15:09) >  IMPRESSION:    Motion artifact degrades image quality.    1. Reversal of the cervical lordosis can be seen in the setting of muscle   spasm.  2. C3-C4 disc bulge, effacing the subarachnoid space, resulting in mild   central canal, severe left and moderate right neural foramen stenosis   with uncovertebral spurring and facet arthrosis.  3. C4-C5broad-based left foraminal disc herniation superimposed upon a   disc bulge, effacing the subarachnoid space, resulting in mild central   canal, severe left and moderate right neural foramen stenosis with   uncovertebral spurring.  4. C5-C6 disc bulge, effacing the subarachnoid space, resulting in mild   central canal, severe left and moderate right neural foramen stenosis   with uncovertebral spurring.  5. Additional findings described in detail above    < end of copied text >      MEDS:  acetaminophen     Tablet .. 650 milliGRAM(s) Oral every 6 hours PRN  amLODIPine   Tablet 10 milliGRAM(s) Oral daily  aspirin enteric coated 81 milliGRAM(s) Oral daily  atorvastatin 40 milliGRAM(s) Oral at bedtime  citalopram 20 milliGRAM(s) Oral daily  dextrose 5%. 1000 milliLiter(s) IV Continuous <Continuous>  dextrose 5%. 1000 milliLiter(s) IV Continuous <Continuous>  dextrose 50% Injectable 12.5 Gram(s) IV Push once  dextrose 50% Injectable 25 Gram(s) IV Push once  dextrose 50% Injectable 25 Gram(s) IV Push once  dextrose Oral Gel 15 Gram(s) Oral once PRN  enoxaparin Injectable 40 milliGRAM(s) SubCutaneous every 24 hours  finasteride 5 milliGRAM(s) Oral daily  gabapentin 100 milliGRAM(s) Oral three times a day  glucagon  Injectable 1 milliGRAM(s) IntraMuscular once  hydrALAZINE 25 milliGRAM(s) Oral three times a day  insulin glargine Injectable (LANTUS) 14 Unit(s) SubCutaneous at bedtime  insulin lispro (ADMELOG) corrective regimen sliding scale   SubCutaneous three times a day before meals  insulin lispro (ADMELOG) corrective regimen sliding scale   SubCutaneous at bedtime  insulin lispro Injectable (ADMELOG) 4 Unit(s) SubCutaneous three times a day before meals  losartan 50 milliGRAM(s) Oral daily  melatonin 3 milliGRAM(s) Oral at bedtime PRN  metoprolol tartrate 12.5 milliGRAM(s) Oral two times a day  mupirocin 2% Ointment 1 Application(s) Topical two times a day  pantoprazole    Tablet 40 milliGRAM(s) Oral before breakfast  sodium chloride 0.9%. 1000 milliLiter(s) IV Continuous <Continuous>  tamsulosin 0.4 milliGRAM(s) Oral at bedtime      PHYSICAL EXAM:  Vital Signs Last 24 Hrs  T(C): 36.3 (22 Aug 2023 11:55), Max: 36.9 (22 Aug 2023 01:33)  T(F): 97.4 (22 Aug 2023 11:55), Max: 98.5 (22 Aug 2023 01:33)  HR: 57 (22 Aug 2023 11:55) (55 - 66)  BP: 138/94 (22 Aug 2023 11:57) (117/71 - 147/64)  BP(mean): --  RR: 16 (22 Aug 2023 11:55) (16 - 18)  SpO2: 97% (22 Aug 2023 11:55) (96% - 100%)    Parameters below as of 22 Aug 2023 11:55  Patient On (Oxygen Delivery Method): room air        LABS:                        13.7   7.18  )-----------( 226      ( 22 Aug 2023 07:36 )             39.4     08-22    140  |  107  |  24<H>  ----------------------------<  95  3.7   |  25  |  1.22    Ca    8.6      22 Aug 2023 07:36  Phos  3.4     08-22  Mg     2.00     08-22                 NEUROSURGERY CONSULT    Consulted for: cervical HNP    HPI: 82M with PMHx CAD s/p stents 2020, HTN, DM2, BPH, MDD presenting with left shoulder pain x2 days. Patient notes lateral shoulder pain that is worse with sitting up and better with lying down. It is limiting his ability to raise his arms above his shoulder level. He denies recent trauma, cuts, procedures to this joint. There is no swelling, erythema as well and denies arthritis in this joint as well. He went to see his PMD today and out of concern for an abnormal EKG he was sent to the ED for evaluation. He denies having any chest pain (despite ED provider note mentioning this). He denies fevers, chills, cough, SOB, palpitations, LH, LOC, abdominal pain, vomiting, diarrhea, LE swelling. Patient does not know all his medications. In 2020 he had CARMEN x2 placed to LCx and pRCA. At the time he was placed on DAPT. He is unsure if he is still on plavix but notes taking baby ASA every day. On surescripts plavix is not listed. He denies exertional CP or COOPER at this time. He believes he is on amlodipine, ASA, atorvastatin, citalopram, metformin when mentioned to him but he is unsure    Of note in ED L 3rd digit paronychia found and s/p I&D (14 Aug 2023 22:52)      RADIOLOGY:   < from: MR Cervical Spine No Cont (08.21.23 @ 15:09) >  IMPRESSION:    Motion artifact degrades image quality.    1. Reversal of the cervical lordosis can be seen in the setting of muscle   spasm.  2. C3-C4 disc bulge, effacing the subarachnoid space, resulting in mild   central canal, severe left and moderate right neural foramen stenosis   with uncovertebral spurring and facet arthrosis.  3. C4-C5broad-based left foraminal disc herniation superimposed upon a   disc bulge, effacing the subarachnoid space, resulting in mild central   canal, severe left and moderate right neural foramen stenosis with   uncovertebral spurring.  4. C5-C6 disc bulge, effacing the subarachnoid space, resulting in mild   central canal, severe left and moderate right neural foramen stenosis   with uncovertebral spurring.  5. Additional findings described in detail above    < end of copied text >      MEDS:  acetaminophen     Tablet .. 650 milliGRAM(s) Oral every 6 hours PRN  amLODIPine   Tablet 10 milliGRAM(s) Oral daily  aspirin enteric coated 81 milliGRAM(s) Oral daily  atorvastatin 40 milliGRAM(s) Oral at bedtime  citalopram 20 milliGRAM(s) Oral daily  dextrose 5%. 1000 milliLiter(s) IV Continuous <Continuous>  dextrose 5%. 1000 milliLiter(s) IV Continuous <Continuous>  dextrose 50% Injectable 12.5 Gram(s) IV Push once  dextrose 50% Injectable 25 Gram(s) IV Push once  dextrose 50% Injectable 25 Gram(s) IV Push once  dextrose Oral Gel 15 Gram(s) Oral once PRN  enoxaparin Injectable 40 milliGRAM(s) SubCutaneous every 24 hours  finasteride 5 milliGRAM(s) Oral daily  gabapentin 100 milliGRAM(s) Oral three times a day  glucagon  Injectable 1 milliGRAM(s) IntraMuscular once  hydrALAZINE 25 milliGRAM(s) Oral three times a day  insulin glargine Injectable (LANTUS) 14 Unit(s) SubCutaneous at bedtime  insulin lispro (ADMELOG) corrective regimen sliding scale   SubCutaneous three times a day before meals  insulin lispro (ADMELOG) corrective regimen sliding scale   SubCutaneous at bedtime  insulin lispro Injectable (ADMELOG) 4 Unit(s) SubCutaneous three times a day before meals  losartan 50 milliGRAM(s) Oral daily  melatonin 3 milliGRAM(s) Oral at bedtime PRN  metoprolol tartrate 12.5 milliGRAM(s) Oral two times a day  mupirocin 2% Ointment 1 Application(s) Topical two times a day  pantoprazole    Tablet 40 milliGRAM(s) Oral before breakfast  sodium chloride 0.9%. 1000 milliLiter(s) IV Continuous <Continuous>  tamsulosin 0.4 milliGRAM(s) Oral at bedtime      PHYSICAL EXAM:  AA&0 x3, CN 2-12 grossly intact  motor- strength RUE, BLE 5/5, LUE: deltoid 4/5, biceps, triceps 5/5, hand  5/5  sensory: SILT  no clonus, no hoffmans    Vital Signs Last 24 Hrs  T(C): 36.3 (22 Aug 2023 11:55), Max: 36.9 (22 Aug 2023 01:33)  T(F): 97.4 (22 Aug 2023 11:55), Max: 98.5 (22 Aug 2023 01:33)  HR: 57 (22 Aug 2023 11:55) (55 - 66)  BP: 138/94 (22 Aug 2023 11:57) (117/71 - 147/64)  RR: 16 (22 Aug 2023 11:55) (16 - 18)  SpO2: 97% (22 Aug 2023 11:55) (96% - 100%)    Parameters below as of 22 Aug 2023 11:55  Patient On (Oxygen Delivery Method): room air        LABS:                        13.7   7.18  )-----------( 226      ( 22 Aug 2023 07:36 )             39.4     08-22    140  |  107  |  24<H>  ----------------------------<  95  3.7   |  25  |  1.22    Ca    8.6      22 Aug 2023 07:36  Phos  3.4     08-22  Mg     2.00     08-22

## 2023-08-22 NOTE — CHART NOTE - NSCHARTNOTEFT_GEN_A_CORE
Notified by RN, Patient with unwitnessed fall, found out of bed on floor on knees, denies hitting his head, denies any bodily injury, patient is A&OX3, patient denies LOC, dizziness, chest pain, SOB at this time. Patient states he was falling asleep and he rolled out of bed, vital signs as noted. Patient noted indentation on right knee, skin intact, non-tender, possibly from telemetry wire/ remote control wire    T(F): 97.2 (22 Aug 2023 17:15,  HR: 63 (22 Aug 2023 17:15)  BP: 146/74 (22 Aug 2023 17:15)   RR: 17 (22 Aug 2023 17:15)  SpO2: 98% (22 Aug 2023 17:15)    Telemetry reviewed, no events of ectopy or bradycardia noted.   Dr. Evangelista made aware

## 2023-08-22 NOTE — PROGRESS NOTE ADULT - SUBJECTIVE AND OBJECTIVE BOX
WILEY BLAKE  82y  Male      Patient is a 82y old  Male who presents with a chief complaint of left shoulder pain x2 days, abnl EKG (17 Aug 2023 10:34)  Patient was seen and examined.chart reviewed,c/o lt.shoulder area pain.no sob,no cp,no fever,no cough    REVIEW OF SYSTEMS:  CONSTITUTIONAL: No fever  RESPIRATORY: No cough, hemoptysis or shortness of breath  CARDIOVASCULAR: No chest pain, palpitations, dizziness, or leg swelling  GASTROINTESTINAL: No abdominal pain. nausea, vomiting, hematemesis  GENITOURINARY: No dysuria, frequency, hematuria   NEUROLOGICAL: Shoulder weakness PRESENT   MUSCULOSKELETAL: No joint pain or swelling;       T(C): 36.2 (08-22-23 @ 17:15), Max: 36.3 (08-22-23 @ 11:55)  HR: 63 (08-22-23 @ 17:15) (57 - 63)  BP: 146/74 (08-22-23 @ 17:15) (138/94 - 146/74)  RR: 17 (08-22-23 @ 17:15) (16 - 17)  SpO2: 98% (08-22-23 @ 17:15) (97% - 98%)      MEDICATIONS  (STANDING):  amLODIPine   Tablet 10 milliGRAM(s) Oral daily  aspirin enteric coated 81 milliGRAM(s) Oral daily  atorvastatin 40 milliGRAM(s) Oral at bedtime  citalopram 20 milliGRAM(s) Oral daily  dextrose 5%. 1000 milliLiter(s) (100 mL/Hr) IV Continuous <Continuous>  dextrose 5%. 1000 milliLiter(s) (50 mL/Hr) IV Continuous <Continuous>  dextrose 50% Injectable 12.5 Gram(s) IV Push once  dextrose 50% Injectable 25 Gram(s) IV Push once  dextrose 50% Injectable 25 Gram(s) IV Push once  enoxaparin Injectable 40 milliGRAM(s) SubCutaneous every 24 hours  finasteride 5 milliGRAM(s) Oral daily  gabapentin 100 milliGRAM(s) Oral three times a day  glucagon  Injectable 1 milliGRAM(s) IntraMuscular once  hydrALAZINE 25 milliGRAM(s) Oral three times a day  insulin glargine Injectable (LANTUS) 14 Unit(s) SubCutaneous at bedtime  insulin lispro (ADMELOG) corrective regimen sliding scale   SubCutaneous three times a day before meals  insulin lispro (ADMELOG) corrective regimen sliding scale   SubCutaneous at bedtime  insulin lispro Injectable (ADMELOG) 4 Unit(s) SubCutaneous three times a day before meals  losartan 50 milliGRAM(s) Oral daily  metoprolol tartrate 12.5 milliGRAM(s) Oral two times a day  mupirocin 2% Ointment 1 Application(s) Topical two times a day  pantoprazole    Tablet 40 milliGRAM(s) Oral before breakfast  sodium chloride 0.9%. 1000 milliLiter(s) (75 mL/Hr) IV Continuous <Continuous>  tamsulosin 0.4 milliGRAM(s) Oral at bedtime    MEDICATIONS  (PRN):  acetaminophen     Tablet .. 650 milliGRAM(s) Oral every 6 hours PRN Temp greater or equal to 38C (100.4F), Mild Pain (1 - 3)  dextrose Oral Gel 15 Gram(s) Oral once PRN Blood Glucose LESS THAN 70 milliGRAM(s)/deciliter  melatonin 3 milliGRAM(s) Oral at bedtime PRN Insomnia              PHYSICAL EXAM:  GENERAL: Alert and awake lying in bed in no distress  HEAD:  Atraumatic, Normocephalic  EYES: EOMI, JOSE, conjunctiva and sclera clear  NECK: Supple, No JVD, Normal thyroid  NERVOUS SYSTEM:  Alert & Oriented X3, Motor and sensory systems are intact, lt.arm weakness  CHEST/LUNG: Bilateral clear breath sounds, no rhochi, no wheezing, no crepitations,  HEART: Regular rate and rhythm; No murmurs, rubs, or gallops  ABDOMEN: Soft, Nontender, Nondistended; Bowel sounds present  EXTREMITIES:   Peripheral Pulses are palpable, no  edema                                                    13.7   7.18  )-----------( 226      ( 22 Aug 2023 07:36 )             39.4               140|107|24<95  3.7|25|1.22  8.6,2.00,3.4  08-22 @ 07:36

## 2023-08-22 NOTE — PROGRESS NOTE ADULT - PROBLEM SELECTOR PROBLEM 5
Type 2 diabetes mellitus with hyperglycemia, without long-term current use of insulin
CAD (coronary artery disease)
Type 2 diabetes mellitus with hyperglycemia, without long-term current use of insulin
CAD (coronary artery disease)
Type 2 diabetes mellitus with hyperglycemia, without long-term current use of insulin
CAD (coronary artery disease)

## 2023-08-22 NOTE — PROGRESS NOTE ADULT - PROBLEM SELECTOR PROBLEM 4
PHILIP (acute kidney injury)
CAD (coronary artery disease)
PHILIP (acute kidney injury)
Paronychia, finger, left
PHILIP (acute kidney injury)
CAD (coronary artery disease)
Paronychia, finger, left
CAD (coronary artery disease)
Paronychia, finger, left

## 2023-08-22 NOTE — CONSULT NOTE ADULT - PROBLEM SELECTOR PROBLEM 1
Stenosis, cervical spine
Type 2 diabetes mellitus with hyperglycemia, without long-term current use of insulin

## 2023-08-22 NOTE — PROGRESS NOTE ADULT - PROBLEM SELECTOR PROBLEM 7
MDD (major depressive disorder)
BPH (benign prostatic hyperplasia)
MDD (major depressive disorder)
BPH (benign prostatic hyperplasia)
MDD (major depressive disorder)

## 2023-08-22 NOTE — CHART NOTE - NSCHARTNOTEFT_GEN_A_CORE
Neurosurgery C/S called for f/u MRI C-Spine results upon assessment this AM, patient noted bradycardic, EKG reviewed- noted sinus bradycardia. Metoprolol 25mg BID decreased to Metoprolol 12.5mg BID.  MRI results reviewed with Dr. Evangelista, recommended ortho c/s. Ortho team called and recommending outpatient f/u   C-spine MRI results reviewed, neurosurgery/spine c/s called, medrol dose pack recommended, orders placed.   Above discussed with Tonja Espino. Dr. Evangelista to call cardiology c/s Upon assessment this AM, patient noted bradycardic, EKG reviewed- noted sinus bradycardia. Metoprolol 25mg BID decreased to Metoprolol 12.5mg BID.  MRI results reviewed with Dr. Evangelista, recommended ortho c/s. Ortho team called and recommending outpatient f/u   C-spine MRI results reviewed, neurosurgery/spine c/s called, recommended outpatient f/u.  Above discussed with Tonja Espino. Dr. Evangelista to call cardiology c/s

## 2023-08-22 NOTE — PROGRESS NOTE ADULT - ASSESSMENT
Patient is an 83 y/o M with a PMHx of T2DM (not on insulin), CAD s/p stents 2020, HTN, BPH, and MDD presenting with left shoulder pain x2 days, concerning for OA vs. adhesive capsulitis. Endocrinology consulted for management of Type 2 DM.    #Type 2 Diabetes Mellitus  A1c: 10.5% (prior A1c 9.9% in 3/2023)  Home Meds: metformin 1000mg BID, Jardiance 25mg once daily, Ozempic 0.25mg once weekly (has taken 3 doses so far)  Does not monitor blood sugars at home  - No clinical signs or lab findings concerning for DKA  -Glucose Target 100-180mg/dl: fasting glucose slightly below goal (95 on serum)  - Reduce Lantus to 12 units qhs  - c/w standing Admelog 4 units TID before meals  - c/w Admelog low correction scale TID before meals and low qhs scale  - c/w FS TID before meals and qhs  - Diet: consistent carb  - RD consult  - Hypoglycemia Protocol     Discharge Planning:  - Pt received 3x doses of 0.25mg Ozempic. Can d/c patient with 0.5mg Ozempic with instructions to start this dosage after he takes his 4th dose of 0.25mg.  - Trend eGFR to assess need for adjusting metformin dosage (if eGFR <45). GFR 60 today - anticipate resume metformin 1000mg BID on dc.  - Resume Jardiance 25mg po daily  - Please call your doctor if your FS is 70 or below and or 250 and above   - Provide patient with glucometer, lancets, and test strips on discharge  - Pt can follow up with Endocrinology outpatient at [address] [place] [address], [address], [place] [address]. [phone].    #Hypertension  Home meds: amlodipine 10mg qd, losartan 50mg qd  - c/w losartan 100mg qd  - c/w metoprolol 25mg BID  - Titration as per primary team   HLD   LDL <70   Continue Lipitor 40mg p.o. qhs if no contraindications   Please obtain lipid profile as an outpt    Lizet Kong MD  Division of Endocrinology  Pager: [phone]    If after 6PM or before 9AM, or on weekends/holidays, please call endocrine answering service for assistance ([phone]).  For nonurgent matters email [email or web address] for assistance.

## 2023-08-22 NOTE — PROGRESS NOTE ADULT - ASSESSMENT
82m cad, hypertension, hyperlipid diabetes who presented w/ L shoulder weakness. Hospital course c/b PHILIP    paronychia s/p I&D  s/p doxycycline  ua with yeast but patient asymptomatic  antifungals not indicated     R shoulder weakness  MRI L shoulder- edema supraspinatus and infraspinatus musculature. Infectious etiology is considered less likely. High-grade tear of the supraspinatus.    Recommendations  antifungals not indicated for asymptomatic candiduria  completed 7 day course of doxycycline for paronychia  no additional antibiotic indicated  consider ortho eval    We will sign off. Thank you for allowing us to participate in the care of Mr. Avendano. Please feel free to call with any questions or concerns.     Sven Higginbotham M.D.  [facility], Division of Infectious Diseases  [phone]  After 5pm on weekdays and all day on weekends - please call [phone]

## 2023-08-22 NOTE — PROGRESS NOTE ADULT - PROBLEM SELECTOR PLAN 8
Resume flomax, finasteride  Confirm meds
Lovenox 40mg qd  DASH/TLC CC diet
Resume flomax, finasteride  Confirm meds
Lovenox 40mg qd  DASH/TLC CC diet
Lovenox 40mg qd  DASH/TLC CC diet
Resume flomax, finasteride  Confirm meds

## 2023-08-22 NOTE — PROGRESS NOTE ADULT - PROBLEM SELECTOR PROBLEM 3
CAD (coronary artery disease)
Paronychia, finger, left
Hypertensive urgency
Paronychia, finger, left
Hypertensive urgency
Hypertensive urgency
Paronychia, finger, left

## 2023-08-22 NOTE — PROGRESS NOTE ADULT - SUBJECTIVE AND OBJECTIVE BOX
Chief Complaint: DM2    History: tolerating po  no hypoglycemia events or symptoms    MEDICATIONS  (STANDING):  amLODIPine   Tablet 10 milliGRAM(s) Oral daily  aspirin enteric coated 81 milliGRAM(s) Oral daily  atorvastatin 40 milliGRAM(s) Oral at bedtime  citalopram 20 milliGRAM(s) Oral daily  dextrose 5%. 1000 milliLiter(s) (100 mL/Hr) IV Continuous <Continuous>  dextrose 5%. 1000 milliLiter(s) (50 mL/Hr) IV Continuous <Continuous>  dextrose 50% Injectable 12.5 Gram(s) IV Push once  dextrose 50% Injectable 25 Gram(s) IV Push once  dextrose 50% Injectable 25 Gram(s) IV Push once  enoxaparin Injectable 40 milliGRAM(s) SubCutaneous every 24 hours  finasteride 5 milliGRAM(s) Oral daily  gabapentin 100 milliGRAM(s) Oral three times a day  glucagon  Injectable 1 milliGRAM(s) IntraMuscular once  hydrALAZINE 25 milliGRAM(s) Oral three times a day  insulin glargine Injectable (LANTUS) 14 Unit(s) SubCutaneous at bedtime  insulin lispro (ADMELOG) corrective regimen sliding scale   SubCutaneous three times a day before meals  insulin lispro (ADMELOG) corrective regimen sliding scale   SubCutaneous at bedtime  insulin lispro Injectable (ADMELOG) 4 Unit(s) SubCutaneous three times a day before meals  losartan 50 milliGRAM(s) Oral daily  methylPREDNISolone   Oral   methylPREDNISolone 24 milliGRAM(s) Oral once  metoprolol tartrate 12.5 milliGRAM(s) Oral two times a day  mupirocin 2% Ointment 1 Application(s) Topical two times a day  pantoprazole    Tablet 40 milliGRAM(s) Oral before breakfast  sodium chloride 0.9%. 1000 milliLiter(s) (75 mL/Hr) IV Continuous <Continuous>  tamsulosin 0.4 milliGRAM(s) Oral at bedtime    MEDICATIONS  (PRN):  acetaminophen     Tablet .. 650 milliGRAM(s) Oral every 6 hours PRN Temp greater or equal to 38C (100.4F), Mild Pain (1 - 3)  dextrose Oral Gel 15 Gram(s) Oral once PRN Blood Glucose LESS THAN 70 milliGRAM(s)/deciliter  melatonin 3 milliGRAM(s) Oral at bedtime PRN Insomnia      Allergies    No Known Allergies    Intolerances      Review of Systems:    ALL OTHER SYSTEMS REVIEWED AND NEGATIVE      PHYSICAL EXAM:  VITALS: T(C): 36.3 (08-22-23 @ 11:55)  T(F): 97.4 (08-22-23 @ 11:55), Max: 98.5 (08-22-23 @ 01:33)  HR: 57 (08-22-23 @ 11:55) (55 - 66)  BP: 138/94 (08-22-23 @ 11:57) (117/71 - 147/64)  RR:  (16 - 18)  SpO2:  (96% - 100%)  Wt(kg): --  GENERAL: NAD, well-groomed, well-developed  EYES: No proptosis, no lid lag, anicteric  HEENT:  Atraumatic, Normocephalic, moist mucous membranes  RESPIRATORY: nonlabored respirations, no wheezing  PSYCH: Alert and oriented x 3, normal affect, normal mood    CAPILLARY BLOOD GLUCOSE      POCT Blood Glucose.: 133 mg/dL (22 Aug 2023 12:07)  POCT Blood Glucose.: 100 mg/dL (22 Aug 2023 08:58)  POCT Blood Glucose.: 118 mg/dL (21 Aug 2023 22:37)  POCT Blood Glucose.: 154 mg/dL (21 Aug 2023 17:23)      08-22    140  |  107  |  24<H>  ----------------------------<  95  3.7   |  25  |  1.22    eGFR: 60    Ca    8.6      08-22  Mg     2.00     08-22  Phos  3.4     08-22        A1C with Estimated Average Glucose Result: 10.5 % (08-15-23 @ 02:00)  A1C with Estimated Average Glucose Result: 9.9 % (03-16-23 @ 15:24)      Thyroid Function Tests:  08-15 @ 02:00 TSH 2.72 FreeT4 -- T3 -- Anti TPO -- Anti Thyroglobulin Ab -- TSI --

## 2023-08-22 NOTE — CONSULT NOTE ADULT - PROBLEM SELECTOR RECOMMENDATION 9
- Medrol dose pack  - PT  - Outpatient f/u w Dr. Anam Rose d/w attending - No acute neurosurgical intervention  - Defer to ortho for supraspinatus tear  - Outpatient f/u w Dr. Anam Rose d/w attending

## 2023-08-22 NOTE — PROGRESS NOTE ADULT - PROBLEM SELECTOR PLAN 6
Resume citalopram 20mg qhs
Per surescripts appears to be on jardiance, ozempic and metformin - hold inpatient  -A1C 10.5  -lantus 10u tonight  -ISS  -endocrine consult in AM
Resume citalopram 20mg qhs

## 2023-08-22 NOTE — PROGRESS NOTE ADULT - PROBLEM SELECTOR PLAN 7
Resume citalopram 20mg qhs
Resume flomax, finasteride  Confirm meds
Resume citalopram 20mg qhs
Resume citalopram 20mg qhs

## 2023-08-22 NOTE — PROGRESS NOTE ADULT - SUBJECTIVE AND OBJECTIVE BOX
[facility] [place] [facility] [facility]   MD CHAD LOPEZ MD KRISTINE SOLTANPOUR, DO ANGELA WONG, PA    TEL:  OFFICE: [phone]  From 5pm-7am Answering Service [phone]    -- RENAL FOLLOW UP NOTE ---Date of Service 08-22-23 @ 13:37    Patient is a 82y old  Male who presents with a chief complaint of left shoulder pain x2 days, abnl EKG (22 Aug 2023 10:54)      Patient seen and examined at bedside. No chest pain/sob    VITALS:  T(F): 97.4 (08-22-23 @ 11:55), Max: 98.5 (08-22-23 @ 01:33)  HR: 57 (08-22-23 @ 11:55)  BP: 138/94 (08-22-23 @ 11:57)  RR: 16 (08-22-23 @ 11:55)  SpO2: 97% (08-22-23 @ 11:55)  Wt(kg): --    08-21 @ 07:01  -  08-22 @ 07:00  --------------------------------------------------------  IN: 400 mL / OUT: 1200 mL / NET: -800 mL          PHYSICAL EXAM:  General: NAD  Neck: No JVD  Respiratory: CTAB, no wheezes, rales or rhonchi  Cardiovascular: S1, S2, RRR  Gastrointestinal: BS+, soft, NT/ND  Extremities: No peripheral edema    Hospital Medications:   MEDICATIONS  (STANDING):  amLODIPine   Tablet 10 milliGRAM(s) Oral daily  aspirin enteric coated 81 milliGRAM(s) Oral daily  atorvastatin 40 milliGRAM(s) Oral at bedtime  citalopram 20 milliGRAM(s) Oral daily  dextrose 5%. 1000 milliLiter(s) (100 mL/Hr) IV Continuous <Continuous>  dextrose 5%. 1000 milliLiter(s) (50 mL/Hr) IV Continuous <Continuous>  dextrose 50% Injectable 25 Gram(s) IV Push once  dextrose 50% Injectable 12.5 Gram(s) IV Push once  dextrose 50% Injectable 25 Gram(s) IV Push once  enoxaparin Injectable 40 milliGRAM(s) SubCutaneous every 24 hours  finasteride 5 milliGRAM(s) Oral daily  gabapentin 100 milliGRAM(s) Oral three times a day  glucagon  Injectable 1 milliGRAM(s) IntraMuscular once  hydrALAZINE 25 milliGRAM(s) Oral three times a day  insulin glargine Injectable (LANTUS) 14 Unit(s) SubCutaneous at bedtime  insulin lispro (ADMELOG) corrective regimen sliding scale   SubCutaneous three times a day before meals  insulin lispro (ADMELOG) corrective regimen sliding scale   SubCutaneous at bedtime  insulin lispro Injectable (ADMELOG) 4 Unit(s) SubCutaneous three times a day before meals  losartan 50 milliGRAM(s) Oral daily  metoprolol tartrate 12.5 milliGRAM(s) Oral two times a day  mupirocin 2% Ointment 1 Application(s) Topical two times a day  pantoprazole    Tablet 40 milliGRAM(s) Oral before breakfast  sodium chloride 0.9%. 1000 milliLiter(s) (75 mL/Hr) IV Continuous <Continuous>  tamsulosin 0.4 milliGRAM(s) Oral at bedtime      LABS:  08-22    140  |  107  |  24<H>  ----------------------------<  95  3.7   |  25  |  1.22    Ca    8.6      22 Aug 2023 07:36  Phos  3.4     08-22  Mg     2.00     08-22      Creatinine Trend: 1.22 <--, 1.24 <--, 1.51 <--, 1.82 <--, 1.33 <--    Phosphorus: 3.4 mg/dL (08-22 @ 07:36)                              13.7   7.18  )-----------( 226      ( 22 Aug 2023 07:36 )             39.4     Urine Studies:  Urinalysis - [08-22-23 @ 07:36]      Color  / Appearance  / SG  / pH       Gluc 95 / Ketone   / Bili  / Urobili        Blood  / Protein  / Leuk Est  / Nitrite       RBC  / WBC  / Hyaline  / Gran  / Sq Epi  / Non Sq Epi  / Bacteria     Urine Creatinine 192      [08-18-23 @ 16:25]  Urine Protein 18      [08-18-23 @ 16:25]  Urine Sodium <20      [08-18-23 @ 16:25]  Urine Urea Nitrogen 1057.0      [08-18-23 @ 16:25]  Urine Osmolality 636      [08-18-23 @ 16:25]    TSH 2.72      [08-15-23 @ 02:00]        RADIOLOGY & ADDITIONAL STUDIES:

## 2023-08-22 NOTE — CONSULT NOTE ADULT - REASON FOR ADMISSION
left shoulder pain x2 days, abnl EKG

## 2023-08-22 NOTE — PROGRESS NOTE ADULT - PROBLEM SELECTOR PROBLEM 2
Abnormal EKG
Hypertensive urgency
Abnormal EKG
Hypertensive urgency
Abnormal EKG
Hypertensive urgency

## 2023-08-23 VITALS
RESPIRATION RATE: 18 BRPM | SYSTOLIC BLOOD PRESSURE: 131 MMHG | DIASTOLIC BLOOD PRESSURE: 66 MMHG | TEMPERATURE: 98 F | OXYGEN SATURATION: 99 % | HEART RATE: 55 BPM

## 2023-08-23 LAB
GLUCOSE BLDC GLUCOMTR-MCNC: 112 MG/DL — HIGH (ref 70–99)
GLUCOSE BLDC GLUCOMTR-MCNC: 131 MG/DL — HIGH (ref 70–99)
GLUCOSE BLDC GLUCOMTR-MCNC: 197 MG/DL — HIGH (ref 70–99)

## 2023-08-23 RX ORDER — SEMAGLUTIDE 0.68 MG/ML
0.25 INJECTION, SOLUTION SUBCUTANEOUS
Refills: 0 | DISCHARGE

## 2023-08-23 RX ORDER — EMPAGLIFLOZIN 10 MG/1
1 TABLET, FILM COATED ORAL
Refills: 0 | DISCHARGE

## 2023-08-23 RX ORDER — EMPAGLIFLOZIN 10 MG/1
1 TABLET, FILM COATED ORAL
Qty: 30 | Refills: 0
Start: 2023-08-23 | End: 2023-09-21

## 2023-08-23 RX ORDER — LOSARTAN POTASSIUM 100 MG/1
1 TABLET, FILM COATED ORAL
Qty: 30 | Refills: 0
Start: 2023-08-23 | End: 2023-09-21

## 2023-08-23 RX ORDER — HYDRALAZINE HCL 50 MG
1 TABLET ORAL
Qty: 90 | Refills: 0
Start: 2023-08-23 | End: 2023-09-21

## 2023-08-23 RX ORDER — METFORMIN HYDROCHLORIDE 850 MG/1
2 TABLET ORAL
Refills: 0 | DISCHARGE

## 2023-08-23 RX ORDER — METOPROLOL TARTRATE 50 MG
0.5 TABLET ORAL
Qty: 30 | Refills: 0
Start: 2023-08-23 | End: 2023-09-21

## 2023-08-23 RX ORDER — METFORMIN HYDROCHLORIDE 850 MG/1
2 TABLET ORAL
Qty: 120 | Refills: 0
Start: 2023-08-23 | End: 2023-09-21

## 2023-08-23 RX ORDER — GABAPENTIN 400 MG/1
1 CAPSULE ORAL
Qty: 90 | Refills: 0
Start: 2023-08-23 | End: 2023-09-21

## 2023-08-23 RX ORDER — SEMAGLUTIDE 0.68 MG/ML
0.5 INJECTION, SOLUTION SUBCUTANEOUS
Qty: 2 | Refills: 0
Start: 2023-08-23 | End: 2023-09-21

## 2023-08-23 RX ORDER — CITALOPRAM 10 MG/1
1 TABLET, FILM COATED ORAL
Qty: 30 | Refills: 0
Start: 2023-08-23 | End: 2023-09-21

## 2023-08-23 RX ORDER — INDAPAMIDE 1.25 MG
1 TABLET ORAL
Refills: 0 | DISCHARGE

## 2023-08-23 RX ORDER — LOSARTAN POTASSIUM 100 MG/1
1 TABLET, FILM COATED ORAL
Refills: 0 | DISCHARGE

## 2023-08-23 RX ORDER — CITALOPRAM 10 MG/1
1 TABLET, FILM COATED ORAL
Refills: 0 | DISCHARGE

## 2023-08-23 RX ADMIN — PANTOPRAZOLE SODIUM 40 MILLIGRAM(S): 20 TABLET, DELAYED RELEASE ORAL at 05:25

## 2023-08-23 RX ADMIN — ENOXAPARIN SODIUM 40 MILLIGRAM(S): 100 INJECTION SUBCUTANEOUS at 00:37

## 2023-08-23 RX ADMIN — Medication 12.5 MILLIGRAM(S): at 05:24

## 2023-08-23 RX ADMIN — MUPIROCIN 1 APPLICATION(S): 20 OINTMENT TOPICAL at 05:26

## 2023-08-23 RX ADMIN — MUPIROCIN 1 APPLICATION(S): 20 OINTMENT TOPICAL at 17:16

## 2023-08-23 RX ADMIN — Medication 1: at 12:13

## 2023-08-23 RX ADMIN — Medication 25 MILLIGRAM(S): at 13:56

## 2023-08-23 RX ADMIN — Medication 4 UNIT(S): at 08:50

## 2023-08-23 RX ADMIN — AMLODIPINE BESYLATE 10 MILLIGRAM(S): 2.5 TABLET ORAL at 05:24

## 2023-08-23 RX ADMIN — GABAPENTIN 100 MILLIGRAM(S): 400 CAPSULE ORAL at 13:56

## 2023-08-23 RX ADMIN — FINASTERIDE 5 MILLIGRAM(S): 5 TABLET, FILM COATED ORAL at 13:55

## 2023-08-23 RX ADMIN — CITALOPRAM 20 MILLIGRAM(S): 10 TABLET, FILM COATED ORAL at 13:55

## 2023-08-23 RX ADMIN — Medication 4 UNIT(S): at 12:14

## 2023-08-23 RX ADMIN — Medication 25 MILLIGRAM(S): at 05:24

## 2023-08-23 RX ADMIN — Medication 4 UNIT(S): at 17:15

## 2023-08-23 RX ADMIN — GABAPENTIN 100 MILLIGRAM(S): 400 CAPSULE ORAL at 05:24

## 2023-08-23 RX ADMIN — Medication 12.5 MILLIGRAM(S): at 17:17

## 2023-08-23 RX ADMIN — LOSARTAN POTASSIUM 50 MILLIGRAM(S): 100 TABLET, FILM COATED ORAL at 05:25

## 2023-08-23 RX ADMIN — Medication 81 MILLIGRAM(S): at 13:55

## 2023-08-23 NOTE — CHART NOTE - NSCHARTNOTEFT_GEN_A_CORE
Pt noted to be virginia overnight to 37 while sleeping, pt was asymptomatic, HR currently 60s, as per Dr. Evangelista, no need for cardiology consult at this time and will plan for discharge.

## 2023-08-23 NOTE — PROGRESS NOTE ADULT - SUBJECTIVE AND OBJECTIVE BOX
[facility] [place] [facility]   MD CHAD LOPEZ MD KRISTINE SOLTANPOUR, DO ANGELA WONG, PA    TEL:  OFFICE: [phone]  From 5pm-7am Answering Service [phone]    -- RENAL FOLLOW UP NOTE ---Date of Service 08-23-23 @ 16:00    Patient is a 82y old  Male who presents with a chief complaint of left shoulder pain x2 days, abnl EKG (22 Aug 2023 21:47)      Patient seen and examined at bedside. No chest pain/sob    VITALS:  T(F): 97.5 (08-23-23 @ 08:50), Max: 97.7 (08-22-23 @ 21:30)  HR: 58 (08-23-23 @ 08:50)  BP: 108/61 (08-23-23 @ 08:50)  RR: 18 (08-23-23 @ 08:50)  SpO2: 98% (08-23-23 @ 08:50)  Wt(kg): --    08-22 @ 07:01  -  08-23 @ 07:00  --------------------------------------------------------  IN: 0 mL / OUT: 325 mL / NET: -325 mL          PHYSICAL EXAM:  General: NAD  Neck: No JVD  Respiratory: CTAB, no wheezes, rales or rhonchi  Cardiovascular: S1, S2, RRR  Gastrointestinal: BS+, soft, NT/ND  Extremities: No peripheral edema    Hospital Medications:   MEDICATIONS  (STANDING):  amLODIPine   Tablet 10 milliGRAM(s) Oral daily  aspirin enteric coated 81 milliGRAM(s) Oral daily  atorvastatin 40 milliGRAM(s) Oral at bedtime  citalopram 20 milliGRAM(s) Oral daily  dextrose 5%. 1000 milliLiter(s) (50 mL/Hr) IV Continuous <Continuous>  dextrose 5%. 1000 milliLiter(s) (100 mL/Hr) IV Continuous <Continuous>  dextrose 50% Injectable 12.5 Gram(s) IV Push once  dextrose 50% Injectable 25 Gram(s) IV Push once  dextrose 50% Injectable 25 Gram(s) IV Push once  enoxaparin Injectable 40 milliGRAM(s) SubCutaneous every 24 hours  finasteride 5 milliGRAM(s) Oral daily  gabapentin 100 milliGRAM(s) Oral three times a day  glucagon  Injectable 1 milliGRAM(s) IntraMuscular once  hydrALAZINE 25 milliGRAM(s) Oral three times a day  insulin glargine Injectable (LANTUS) 14 Unit(s) SubCutaneous at bedtime  insulin lispro (ADMELOG) corrective regimen sliding scale   SubCutaneous three times a day before meals  insulin lispro (ADMELOG) corrective regimen sliding scale   SubCutaneous at bedtime  insulin lispro Injectable (ADMELOG) 4 Unit(s) SubCutaneous three times a day before meals  losartan 50 milliGRAM(s) Oral daily  metoprolol tartrate 12.5 milliGRAM(s) Oral two times a day  mupirocin 2% Ointment 1 Application(s) Topical two times a day  pantoprazole    Tablet 40 milliGRAM(s) Oral before breakfast  sodium chloride 0.9%. 1000 milliLiter(s) (75 mL/Hr) IV Continuous <Continuous>  tamsulosin 0.4 milliGRAM(s) Oral at bedtime      LABS:  08-22    140  |  107  |  24<H>  ----------------------------<  95  3.7   |  25  |  1.22    Ca    8.6      22 Aug 2023 07:36  Phos  3.4     08-22  Mg     2.00     08-22      Creatinine Trend: 1.22 <--, 1.24 <--, 1.51 <--, 1.82 <--                                13.7   7.18  )-----------( 226      ( 22 Aug 2023 07:36 )             39.4     Urine Studies:  Urinalysis - [08-22-23 @ 07:36]      Color  / Appearance  / SG  / pH       Gluc 95 / Ketone   / Bili  / Urobili        Blood  / Protein  / Leuk Est  / Nitrite       RBC  / WBC  / Hyaline  / Gran  / Sq Epi  / Non Sq Epi  / Bacteria     Urine Creatinine 192      [08-18-23 @ 16:25]  Urine Protein 18      [08-18-23 @ 16:25]  Urine Sodium <20      [08-18-23 @ 16:25]  Urine Urea Nitrogen 1057.0      [08-18-23 @ 16:25]  Urine Osmolality 636      [08-18-23 @ 16:25]    TSH 2.72      [08-15-23 @ 02:00]        RADIOLOGY & ADDITIONAL STUDIES:

## 2023-08-23 NOTE — PROGRESS NOTE ADULT - PROVIDER SPECIALTY LIST ADULT
Nephrology
Nephrology
Neurology
Endocrinology
Infectious Disease
Nephrology
Neurology
Infectious Disease
Nephrology
Nephrology
Endocrinology
Internal Medicine
Hospitalist
Internal Medicine
Internal Medicine
Hospitalist

## 2023-08-23 NOTE — PROGRESS NOTE ADULT - ASSESSMENT
82M with CAD s/p stents 2020, HTN, DM2, BPH, MDD presenting with left shoulder pain x2 days. Patient notes lateral shoulder pain that is worse with sitting up and better with lying down. It is limiting his ability to raise his arms above his shoulder level.    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 no new finidngs   A1c 10.5   MRI C spien with severe left and moderate R foraminal stenosis.  C4/5 with severe L and mod Right foramenal stenosis     Impression:   1) LUE weakness does not seem central. possible shoulder related   2) Stroke likely small vessel, R thalalmic   3) unsteady gait from cerebellar atrophy      - ortho and neurosx recs appreicated   - c/w asa and statin therapy with LDL goal < 70mg/dL for secondary stroke prevention.   - PT/OT   - no obojection to gabapenting 100mg TID    -   lipid panel  - better DM control   - check FS, glucose control <180  - GI/DVT ppx   - Thank you for allowing me to participate in the care of this patient. Call with questions.   dc planning   Kwaku Nguyen MD  Vascular Neurology  Office: [phone]

## 2023-08-23 NOTE — PROVIDER CONTACT NOTE (FALL NOTIFICATION) - ACTION/TREATMENT ORDERED:
VS found to be normal Found no event on tele prior to fall. Continue to monitor patient and prevent falls by removing clutter from room

## 2023-08-23 NOTE — PROGRESS NOTE ADULT - ASSESSMENT
82M with PMHx CAD s/p stents 2020, HTN, DM2, BPH, MDD presenting with left shoulder pain x2 days. Patient notes lateral shoulder pain that is worse with sitting up and better with lying down. It is limiting his ability to raise his arms above his shoulder level. Nephrology consult called for PHILIP    Assessment:  1) Non oliguric PHILIP with improving renal function with S cr 1.2 likely pre-renal/dehydration vs ATN from renal hypoperfusion  2) Hypertensive nephropathy  3) Dm type II with diabetic neuropathy/nephropathy  4) BPH with LUTs  5)  Electrolytes disorder with Hypokalemia  6) MDD  7) Left shoulder pain/weakness    Recommend:  Strict I/o  Avoid Nephrotoxic agents  S cr 1.2  with non oliguria stable continue to monitor  Will follow

## 2023-08-23 NOTE — CHART NOTE - NSCHARTNOTEFT_GEN_A_CORE
I was called to consult on Mr. Avendano for bradycardia.   Patient is followed by Dr. North of [facility].  ACP Mitzi Smith informed- she will consult [facility].

## 2023-08-23 NOTE — PROVIDER CONTACT NOTE (FALL NOTIFICATION) - ASSESSMENT
patient found to be on his knees. neighbors alerted me. patient was conscious and stated " he was fine and not hurt" Patient had an indentation on his right knee. ACP made aware ACP came to scene right away.

## 2023-08-23 NOTE — PROGRESS NOTE ADULT - REASON FOR ADMISSION
left shoulder pain x2 days, abnl EKG

## 2023-08-23 NOTE — PROGRESS NOTE ADULT - SUBJECTIVE AND OBJECTIVE BOX
Neurology     S: patient seen. doing okay ; dc planning       Medications: MEDICATIONS  (STANDING):  amLODIPine   Tablet 10 milliGRAM(s) Oral daily  aspirin enteric coated 81 milliGRAM(s) Oral daily  atorvastatin 40 milliGRAM(s) Oral at bedtime  citalopram 20 milliGRAM(s) Oral daily  dextrose 5%. 1000 milliLiter(s) (100 mL/Hr) IV Continuous <Continuous>  dextrose 5%. 1000 milliLiter(s) (50 mL/Hr) IV Continuous <Continuous>  dextrose 50% Injectable 25 Gram(s) IV Push once  dextrose 50% Injectable 12.5 Gram(s) IV Push once  dextrose 50% Injectable 25 Gram(s) IV Push once  enoxaparin Injectable 40 milliGRAM(s) SubCutaneous every 24 hours  finasteride 5 milliGRAM(s) Oral daily  gabapentin 100 milliGRAM(s) Oral three times a day  glucagon  Injectable 1 milliGRAM(s) IntraMuscular once  hydrALAZINE 25 milliGRAM(s) Oral three times a day  insulin glargine Injectable (LANTUS) 14 Unit(s) SubCutaneous at bedtime  insulin lispro (ADMELOG) corrective regimen sliding scale   SubCutaneous three times a day before meals  insulin lispro (ADMELOG) corrective regimen sliding scale   SubCutaneous at bedtime  insulin lispro Injectable (ADMELOG) 4 Unit(s) SubCutaneous three times a day before meals  losartan 50 milliGRAM(s) Oral daily  metoprolol tartrate 12.5 milliGRAM(s) Oral two times a day  mupirocin 2% Ointment 1 Application(s) Topical two times a day  pantoprazole    Tablet 40 milliGRAM(s) Oral before breakfast  sodium chloride 0.9%. 1000 milliLiter(s) (75 mL/Hr) IV Continuous <Continuous>  tamsulosin 0.4 milliGRAM(s) Oral at bedtime    MEDICATIONS  (PRN):  acetaminophen     Tablet .. 650 milliGRAM(s) Oral every 6 hours PRN Temp greater or equal to 38C (100.4F), Mild Pain (1 - 3)  dextrose Oral Gel 15 Gram(s) Oral once PRN Blood Glucose LESS THAN 70 milliGRAM(s)/deciliter  melatonin 3 milliGRAM(s) Oral at bedtime PRN Insomnia       Vitals:  Vital Signs Last 24 Hrs  T(C): 36.6 (23 Aug 2023 14:00), Max: 36.6 (23 Aug 2023 14:00)  T(F): 97.8 (23 Aug 2023 14:00), Max: 97.8 (23 Aug 2023 14:00)  HR: 55 (23 Aug 2023 14:00) (55 - 79)  BP: 131/66 (23 Aug 2023 14:00) (108/61 - 156/93)  BP(mean): --  RR: 18 (23 Aug 2023 14:00) (17 - 18)  SpO2: 99% (23 Aug 2023 14:00) (98% - 99%)    Parameters below as of 23 Aug 2023 14:00  Patient On (Oxygen Delivery Method): room air              General Exam:   General Appearance: Appropriately dressed and in no acute distress       Head: Normocephalic, atraumatic and no dysmorphic features  Ear, Nose, and Throat: Moist mucous membranes  CVS: S1S2+  Resp: No SOB, no wheeze or rhonchi  GI: soft NT/ND  Extremities: No edema or cyanosis  Skin:  L 3rd digit paronychia     Neurological Exam:  Mental Status: Awake, alert and oriented x 2-3.  Able to follow simple and complex verbal commands. Able to name and repeat. fluent speech. No obvious aphasia or dysarthria noted.   Cranial Nerves: PERRL, EOMI, VFFC, sensation V1-V3 intact,  no obvious facial asymmetry, equal elevation of palate, scm/trap 5/5, tongue is midline on protrusion. no obvious papilledema on fundoscopic exam. hearing is grossly intact.   Motor: Normal bulk, tone and strength throughout except LUE pain limited 3/5 weakness  Sensation: Intact to light touch and pinprick throughout. no right/left confusion. no extinction to tactile on DSS.   Reflexes: 1+ throughout at biceps, brachioradialis, triceps, patellars and ankles bilaterally and equal. No clonus. R toe and L toe were both downgoing.  Coordination: No dysmetria on FNF or HKS  Gait: cane [employer]     Data/Labs/Imaging which I personally reviewed.       LABS:                          13.7   7.18  )-----------( 226      ( 22 Aug 2023 07:36 )             39.4     08-22    140  |  107  |  24<H>  ----------------------------<  95  3.7   |  25  |  1.22    Ca    8.6      22 Aug 2023 07:36  Phos  3.4     08-22  Mg     2.00     08-22          Urinalysis Basic - ( 22 Aug 2023 07:36 )    Color: x / Appearance: x / SG: x / pH: x  Gluc: 95 mg/dL / Ketone: x  / Bili: x / Urobili: x   Blood: x / Protein: x / Nitrite: x   Leuk Esterase: x / RBC: x / WBC x   Sq Epi: x / Non Sq Epi: x / Bacteria: x            < from: CT Head No Cont (03.16.23 @ 16:48) >    ACC: [id number] EXAM:  CT BRAIN   ORDERED BY: BK PAREKH     PROCEDURE DATE:  03/16/2023          INTERPRETATION:  CT head without IV contrast    CLINICAL INFORMATION: Dizziness    TECHNIQUE: Contiguous axial 5 mm sections were obtained through the head.   Sagittal and coronal 2-D reformatted images were also obtained.   This   scan was performed using automatic exposure control (radiation dose   reduction software) to obtain a diagnostic image quality scan with   patient dose as low as reasonably achievable.    FINDINGS:   No previous examinations are available for review.    The brain demonstrates mild periventricular and scattered bifrontal and   biparietal subcortical white matter ischemia. Old lacunar infarction is   seen within the lateral RIGHT thalamus. No acute cerebral cortical   infarct is seen.  No intracranial hemorrhage is found.  No mass effect is   found in the brain.    The ventricles, sulci and basal cisterns demonstrates mild cerebellar   atrophy.    The orbits are unremarkable.  The paranasal sinuses are clear.  The nasal   cavity appears intact.  The nasopharynx is symmetric.  The central skull   base, petrous temporal bones and calvarium remain intact.      IMPRESSION: Mild periventricular and scattered bifrontaland biparietal   subcortical white matter ischemia. Old lacunar infarction is seen within   the lateral RIGHT thalamus. Mild cerebellar atrophy is noted.    --- End of Report ---            BRYANT FRANCISCO MD; Attending Radiologist  This document has been electronically signed. Mar 16 2023  4:55PM    < end of copied text >  < from: CT Head No Cont (08.18.23 @ 10:01) >    ACC: [id number] EXAM:  CT BRAIN   ORDERED BY: KENNEDY PALOMARES     PROCEDURE DATE:  08/18/2023          INTERPRETATION:  CLINICAL INDICATION: Left arm weakness/pain for   approximately 5 days    COMPARISON: CT head 3/16/2023    TECHNIQUE: Axial noncontrast CT images of the head were obtained.   Sagittal and coronal reformatted images were provided. Bone and soft   tissue windows were evaluated.    FINDINGS:    There is no acute intracranial hemorrhage or mass effect. Gray-white   differentiation ispreserved. Mild periventricular and subcortical white   matter hypoattenuation compatible with chronic microvascular ischemic   changes. A chronic lacunar infarct involves the right thalamus/posterior   limb right internal capsule  No extra-axial collection.  Ventricles and sulci are normal in size for the patient's age without   hydrocephalus. Basal cisterns are patent.  Mild bilateral ethmoid air cell mucosal thickening. The remaining   visualized paranasal sinuses are clear. Mild bilateral mastoid air cell   opacification, nonspecific.  Calvarium is intact. Mild soft tissue thickening at the right nasal   sidewall. Correlate clinically.  Mild basilar artery and bilateral ICA calcifications.  Tortuosity of the proximal intracranial circulation is seen which   suggests hypertension.  Bilateral lens replacement.    IMPRESSION:    No acute hemorrhage, mass effect, or edema.  Chronic lacunar infarct right thalamus/posterior limb right internal   capsule    --- End of Report ---   < from: MR Cervical Spine No Cont (08.21.23 @ 15:09) >    ACC: [id number] EXAM:  MR SPINE CERVICAL   ORDERED BY: KENNEDY PALOMARES     PROCEDURE DATE:  08/21/2023          INTERPRETATION:  CLINICAL STATEMENT: Evaluate for radiculopathy. New left   upper extremity weakness and pain.    TECHNIQUE: Multiplanar multisequence noncontrast MRI cervical spine.   Multiple images degraded by motion artifact, precluding accurate   assessment of cord signal.  COMPARISON: No similar prior studies for comparison.    FINDINGS:    No compression fracture or evidence of marrow replacement process. No   gross intrathecal or paraspinal mass. Subcentimeter left T2-T3 perineural   cysts.    Reversal of the cervical lordosis. Diffuse vertebral disc space narrowing   with reactive marrow signal changes, including foci of edema, in the   endplates at multiple levels. Varying degrees of diffuse disc desiccation   with multilevel endplate and uncovertebral spurring. Multilevel facet   arthrosis, most pronounced and severe in degree at the left C2-C3 and   left C3-C4 levels.    C2-C3: Disc bulge, impinging upon the thecal sac, resulting in moderate   left and mild right neural foramen stenosis with uncovertebral spurring   and facet arthrosis.  C3-C4: Disc bulge, effacing the subarachnoid space, resulting in mild   central canal, severe left and moderate right neural foramen stenosis   with uncovertebral spurring and facet arthrosis.  C4-C5: Broad-based left foraminal disc herniation superimposed upon a   disc bulge, effacing the subarachnoid space, resulting in mildcentral   canal, severe left and moderate right neural foramen stenosis with   uncovertebral spurring.  C5-C6: Disc bulge, effacing the subarachnoid space, resulting in mild   central canal, severe left and moderate right neural foramen stenosis   with uncovertebral spurring.  C6-C7: Disc bulge, impinging upon the thecal sac, resulting in moderate   left and mild right neural foramen stenosis with uncovertebral spurring.  C7-T1: Disc bulge, without stenosis.    T1-T2: Evidence of a broad-based right foraminal disc herniation   superimposed upon a disc bulge, resulting in moderate right neural   foramen stenosis, only imaged in the sagittal plane    IMPRESSION:    Motion artifact degrades image quality.    1. Reversal of the cervical lordosis can be seen in the setting of muscle   spasm.  2. C3-C4 disc bulge, effacing the subarachnoid space, resulting in mild   central canal, severe left and moderate right neural foramen stenosis   with uncovertebral spurring and facet arthrosis.  3. C4-C5broad-based left foraminal disc herniation superimposed upon a   disc bulge, effacing the subarachnoid space, resulting in mild central   canal, severe left and moderate right neural foramen stenosis with   uncovertebral spurring.  4. C5-C6 disc bulge, effacing the subarachnoid space, resulting in mild   central canal, severe left and moderate right neural foramen stenosis   with uncovertebral spurring.  5. Additional findings described in detail above    --- End of Report ---            RAMONA AGUERO M.D., ATTENDING RADIOLOGIST  This document has been electronically signed. Aug 21 2023  4:19PM    < end of copied text >

## 2023-08-23 NOTE — CHART NOTE - NSCHARTNOTEFT_GEN_A_CORE
Patient is an 83 y/o M with a PMHx of T2DM (not on insulin), CAD s/p stents 2020, HTN, BPH, and MDD presenting with left shoulder pain x2 days, concerning for OA vs. adhesive capsulitis. Endocrinology consulted for management of Type 2 DM.    #Type 2 Diabetes Mellitus  A1c: 10.5% (prior A1c 9.9% in 3/2023)  Home Meds: metformin 1000mg BID, Jardiance 25mg once daily, Ozempic 0.25mg once weekly (has taken 3 doses so far)  Does not monitor blood sugars at home  - No clinical signs or lab findings concerning for DKA  -Glucose Target 100-180mg/dl: fasting glucose slightly below goal (95 on serum)  - Reduce Lantus to 12 units qhs  - c/w standing Admelog 4 units TID before meals  - c/w Admelog low correction scale TID before meals and low qhs scale  - c/w FS TID before meals and qhs  - Diet: consistent carb  - RD consult  - Hypoglycemia Protocol     Discharge Planning:  - Pt received 3x doses of 0.25mg Ozempic. Can d/c patient with 0.5mg Ozempic with instructions to start this dosage after he takes his 4th dose of 0.25mg (1 more week of this)  - Resume metformin 1000mg BID with meals on discharge eith close monitoring of gfr as an outpt   - Resume Jardiance 25mg po daily  - Please call your doctor if your FS is 70 or below and or 250 and above   - Provide patient with glucometer, lancets, and test strips on discharge  - Pt can follow up with Endocrinology outpatient at [address] [place] [address], [address], [place] [address]. [phone].    #Hypertension  Home meds: amlodipine 10mg qd, losartan 50mg qd  - c/w losartan 100mg qd  - c/w metoprolol 25mg BID  - Titration as per primary team   HLD   LDL <70   Continue Lipitor 40mg p.o. qhs if no contraindications   Please obtain lipid profile as an outpt      MEDICATIONS  (STANDING):  amLODIPine   Tablet 10 milliGRAM(s) Oral daily  aspirin enteric coated 81 milliGRAM(s) Oral daily  atorvastatin 40 milliGRAM(s) Oral at bedtime  citalopram 20 milliGRAM(s) Oral daily  dextrose 5%. 1000 milliLiter(s) (50 mL/Hr) IV Continuous <Continuous>  dextrose 5%. 1000 milliLiter(s) (100 mL/Hr) IV Continuous <Continuous>  dextrose 50% Injectable 25 Gram(s) IV Push once  dextrose 50% Injectable 12.5 Gram(s) IV Push once  dextrose 50% Injectable 25 Gram(s) IV Push once  enoxaparin Injectable 40 milliGRAM(s) SubCutaneous every 24 hours  finasteride 5 milliGRAM(s) Oral daily  gabapentin 100 milliGRAM(s) Oral three times a day  glucagon  Injectable 1 milliGRAM(s) IntraMuscular once  hydrALAZINE 25 milliGRAM(s) Oral three times a day  insulin glargine Injectable (LANTUS) 14 Unit(s) SubCutaneous at bedtime  insulin lispro (ADMELOG) corrective regimen sliding scale   SubCutaneous three times a day before meals  insulin lispro (ADMELOG) corrective regimen sliding scale   SubCutaneous at bedtime  insulin lispro Injectable (ADMELOG) 4 Unit(s) SubCutaneous three times a day before meals  losartan 50 milliGRAM(s) Oral daily  metoprolol tartrate 12.5 milliGRAM(s) Oral two times a day  mupirocin 2% Ointment 1 Application(s) Topical two times a day  pantoprazole    Tablet 40 milliGRAM(s) Oral before breakfast  sodium chloride 0.9%. 1000 milliLiter(s) (75 mL/Hr) IV Continuous <Continuous>  tamsulosin 0.4 milliGRAM(s) Oral at bedtime    MEDICATIONS  (PRN):  acetaminophen     Tablet .. 650 milliGRAM(s) Oral every 6 hours PRN Temp greater or equal to 38C (100.4F), Mild Pain (1 - 3)  dextrose Oral Gel 15 Gram(s) Oral once PRN Blood Glucose LESS THAN 70 milliGRAM(s)/deciliter  melatonin 3 milliGRAM(s) Oral at bedtime PRN Insomnia      Allergies: No Known Allergies      Review of Systems:  Respiratory: No SOB, no cough  GI: No nausea, vomiting, abdominal pain  Endocrine: no polyuria, polydipsia    PHYSICAL EXAM:  VITALS: T(C): 36.4 (08-23-23 @ 08:50)  T(F): 97.5 (08-23-23 @ 08:50), Max: 97.7 (08-22-23 @ 21:30)  HR: 58 (08-23-23 @ 08:50) (58 - 79)  BP: 108/61 (08-23-23 @ 08:50) (108/61 - 156/93)  RR:  (17 - 18)  SpO2:  (98% - 98%)  Wt(kg): --  GENERAL: NAD, well-groomed, well-developed  RESPIRATORY: No labored breathing   GI: Soft, nontender, non distended  PSYCH: Alert and oriented x 3, normal affect, normal mood      CAPILLARY BLOOD GLUCOSE  POCT Blood Glucose.: 197 mg/dL (23 Aug 2023 12:17)  POCT Blood Glucose.: 112 mg/dL (23 Aug 2023 08:22)  POCT Blood Glucose.: 139 mg/dL (22 Aug 2023 22:53)  POCT Blood Glucose.: 143 mg/dL (22 Aug 2023 21:17)  POCT Blood Glucose.: 97 mg/dL (22 Aug 2023 17:35)    A1C with Estimated Average Glucose (08.15.23 @ 02:00)    A1C with Estimated Average Glucose Result: 10.5    Estimated Average Glucose: 255    08-22    140  |  107  |  24<H>  ----------------------------<  95  3.7   |  25  |  1.22    eGFR: 60    Ca    8.6      08-22  Mg     2.00     08-22  Phos  3.4     08-22      Thyroid Function Tests:  08-15 @ 02:00 TSH 2.72 FreeT4 -- T3 -- Anti TPO -- Anti Thyroglobulin Ab -- TSI --      Diet, DASH/TLC:   Sodium & Cholesterol Restricted  Consistent Carbohydrate {No Snacks} (CSTCHO) (08-16-23 @ 15:46) [Active]    D/W ACP     Monique Montoya  Nurse Practitioner  Division of Endocrinology & Diabetes  In house pager #[phone]    If before 9AM or after 6PM, or on weekends/holidays, please call endocrine answering service for assistance ([phone]).For nonurgent matters email [email or web address] for assistance. Patient is an 83 y/o M with a PMHx of T2DM (not on insulin), CAD s/p stents 2020, HTN, BPH, and MDD presenting with left shoulder pain x2 days, concerning for OA vs. adhesive capsulitis. Endocrinology consulted for management of Type 2 DM.    #Type 2 Diabetes Mellitus  A1c: 10.5% (prior A1c 9.9% in 3/2023)  Home Meds: metformin 1000mg BID, Jardiance 25mg once daily, Ozempic 0.25mg once weekly (has taken 3 doses so far)  Does not monitor blood sugars at home  - No clinical signs or lab findings concerning for DKA  -Glucose Target 100-180mg/dl: Stable today   - Continue Lantus 14 units sq qhs  - Continue with standing Admelog 4 units TID before meals  - Continue with Admelog low correction scale TID before meals and low qhs scale  - Continue FS TID before meals and qhs  - Diet: consistent carb  - RD consult  - Hypoglycemia Protocol     Discharge Planning:  - Pt received 3x doses of 0.25mg Ozempic. Can d/c patient with 0.5mg Ozempic with instructions to start this dosage after he takes his 4th dose of 0.25mg (1 more week of this)  - Resume metformin 1000mg BID with meals on discharge eith close monitoring of gfr as an outpt   - Resume Jardiance 25mg po daily  - Please call your doctor if your FS is 70 or below and or 250 and above   - Please follow up with endocrinology, pcp, opthalmology, and podiatry as an outpt  - Provide patient with glucometer, lancets, and test strips on discharge  - Pt can follow up with Endocrinology outpatient at [address] [place] [address], [address], [place] [address]. [phone].    #Hypertension  Home meds: amlodipine 10mg qd, losartan 50mg qd  - c/w losartan 100mg qd  - c/w metoprolol 25mg BID  - Titration as per primary team     HLD   LDL <70   Continue Lipitor 40mg p.o. qhs if no contraindications   Please obtain lipid profile as an outpt      MEDICATIONS  (STANDING):  amLODIPine   Tablet 10 milliGRAM(s) Oral daily  aspirin enteric coated 81 milliGRAM(s) Oral daily  atorvastatin 40 milliGRAM(s) Oral at bedtime  citalopram 20 milliGRAM(s) Oral daily  dextrose 5%. 1000 milliLiter(s) (50 mL/Hr) IV Continuous <Continuous>  dextrose 5%. 1000 milliLiter(s) (100 mL/Hr) IV Continuous <Continuous>  dextrose 50% Injectable 25 Gram(s) IV Push once  dextrose 50% Injectable 12.5 Gram(s) IV Push once  dextrose 50% Injectable 25 Gram(s) IV Push once  enoxaparin Injectable 40 milliGRAM(s) SubCutaneous every 24 hours  finasteride 5 milliGRAM(s) Oral daily  gabapentin 100 milliGRAM(s) Oral three times a day  glucagon  Injectable 1 milliGRAM(s) IntraMuscular once  hydrALAZINE 25 milliGRAM(s) Oral three times a day  insulin glargine Injectable (LANTUS) 14 Unit(s) SubCutaneous at bedtime  insulin lispro (ADMELOG) corrective regimen sliding scale   SubCutaneous three times a day before meals  insulin lispro (ADMELOG) corrective regimen sliding scale   SubCutaneous at bedtime  insulin lispro Injectable (ADMELOG) 4 Unit(s) SubCutaneous three times a day before meals  losartan 50 milliGRAM(s) Oral daily  metoprolol tartrate 12.5 milliGRAM(s) Oral two times a day  mupirocin 2% Ointment 1 Application(s) Topical two times a day  pantoprazole    Tablet 40 milliGRAM(s) Oral before breakfast  sodium chloride 0.9%. 1000 milliLiter(s) (75 mL/Hr) IV Continuous <Continuous>  tamsulosin 0.4 milliGRAM(s) Oral at bedtime    MEDICATIONS  (PRN):  acetaminophen     Tablet .. 650 milliGRAM(s) Oral every 6 hours PRN Temp greater or equal to 38C (100.4F), Mild Pain (1 - 3)  dextrose Oral Gel 15 Gram(s) Oral once PRN Blood Glucose LESS THAN 70 milliGRAM(s)/deciliter  melatonin 3 milliGRAM(s) Oral at bedtime PRN Insomnia      Allergies: No Known Allergies      Review of Systems:  Respiratory: No SOB, no cough  GI: No nausea, vomiting, abdominal pain  Endocrine: no polyuria, polydipsia    PHYSICAL EXAM:  VITALS: T(C): 36.4 (08-23-23 @ 08:50)  T(F): 97.5 (08-23-23 @ 08:50), Max: 97.7 (08-22-23 @ 21:30)  HR: 58 (08-23-23 @ 08:50) (58 - 79)  BP: 108/61 (08-23-23 @ 08:50) (108/61 - 156/93)  RR:  (17 - 18)  SpO2:  (98% - 98%)  Wt(kg): --  GENERAL: NAD, well-groomed, well-developed  RESPIRATORY: No labored breathing   GI: Soft, nontender, non distended  PSYCH: Alert and oriented x 3, normal affect, normal mood      CAPILLARY BLOOD GLUCOSE  POCT Blood Glucose.: 197 mg/dL (23 Aug 2023 12:17)  POCT Blood Glucose.: 112 mg/dL (23 Aug 2023 08:22)  POCT Blood Glucose.: 139 mg/dL (22 Aug 2023 22:53)  POCT Blood Glucose.: 143 mg/dL (22 Aug 2023 21:17)  POCT Blood Glucose.: 97 mg/dL (22 Aug 2023 17:35)    A1C with Estimated Average Glucose (08.15.23 @ 02:00)    A1C with Estimated Average Glucose Result: 10.5    Estimated Average Glucose: 255    08-22    140  |  107  |  24<H>  ----------------------------<  95  3.7   |  25  |  1.22    eGFR: 60    Ca    8.6      08-22  Mg     2.00     08-22  Phos  3.4     08-22      Thyroid Function Tests:  08-15 @ 02:00 TSH 2.72 FreeT4 -- T3 -- Anti TPO -- Anti Thyroglobulin Ab -- TSI --      Diet, DASH/TLC:   Sodium & Cholesterol Restricted  Consistent Carbohydrate {No Snacks} (CSTCHO) (08-16-23 @ 15:46) [Active]    D/W ACP     Monique Montoya  Nurse Practitioner  Division of Endocrinology & Diabetes  In house pager #[phone]    If before 9AM or after 6PM, or on weekends/holidays, please call endocrine answering service for assistance ([phone]).For nonurgent matters email [email or web address] for assistance.

## 2023-08-23 NOTE — PROGRESS NOTE ADULT - NUTRITIONAL ASSESSMENT
Able to tolerate po intake

## 2023-08-24 PROBLEM — I25.10 ATHEROSCLEROTIC HEART DISEASE OF NATIVE CORONARY ARTERY WITHOUT ANGINA PECTORIS: Chronic | Status: ACTIVE | Noted: 2023-08-14

## 2023-09-06 NOTE — CHART NOTE - NSCHARTNOTEFT_GEN_A_CORE
Post-Discharge Medication Review    Patient contacted to offer medication counseling post-discharge; counseling completed with patient and patient's friend (Asad Raya) who helps with medications. Medication reconciliation completed. Per patient/patient's friend, current medications include:  -  albuterol 90 mcg/inh inhalation aerosol 2 puff(s) inhaled every 6 hours as needed for  shortness of breath and/or wheezing  (patient states he has not needed)  -  amLODIPine 10 mg oral tablet 1 tab(s) orally once a day  -  aspirin 81 mg oral delayed release tablet 1 tab(s) orally once a day    -  atorvastatin 40 mg oral tablet 1 tab(s) orally once a day    -  citalopram 20 mg oral tablet 1 tab(s) orally once a day  -  finasteride 5 mg oral tablet 1 tab(s) orally once a day    -  gabapentin 100 mg oral capsule 1 cap(s) orally 3 times a day    -  Jardiance 25 mg oral tablet 1 tab(s) orally once a day     -  losartan 100 mg oral tablet 1 tab(s) orally once a day (patient/patient's friend stated this dose was increased from 50 to 100 mG by outpatient provider)  -  metFORMIN 500 mg oral tablet, extended release 2 tab(s) orally 2 times a day   -  metoprolol tartrate 25 mg oral tablet 0.5 tab(s) orally 2 times a day    -  omeprazole 20 mg oral delayed release capsule 1 cap(s) orally once a day    -  Ozempic 2 mg/3 mL (0.25 mg or 0.5 mg dose) subcutaneous solution 0.5 milligram(s) subcutaneously once a week on Monday    -  tamsulosin 0.4 mg oral capsule 1 cap(s) orally once a day    Patient/patient's friend stated hydrALAZINE 25 mg oral tablet 1 tab(s) orally 3 times a day was discontinued by outpatient provider.     Medication name, indication, dose, administration, side effects, and monitoring reviewed for new medications post-discharge with patient/patient's friend. Patient/patient's friend demonstrated understanding. Counseling offered for all medications. Patient’s preferred pharmacy ([facility]) updated in OMR.    Patient has follow-up appointments scheduled with provider post-discharge; patient will follow-up with additional outpatient providers to schedule appointments. Encouraged patient to follow up with provider and keep appointment.

## 2023-09-06 NOTE — CHART NOTE - NSCHARTNOTESELECT_GEN_ALL_CORE
Endocrinology/Event Note
Off Service Note
Endocrinology/Event Note
Event Note
Event Note
Fall/Event Note
Medication Reconcillation/Event Note
Orthopedic PA/Event Note
Post-Discharge Note

## 2023-09-08 RX ORDER — LOSARTAN POTASSIUM 100 MG/1
1 TABLET, FILM COATED ORAL
Refills: 0 | DISCHARGE

## 2023-09-25 ENCOUNTER — APPOINTMENT (OUTPATIENT)
Dept: ENDOCRINOLOGY | Facility: CLINIC | Age: 83
End: 2023-09-25

## 2023-11-09 NOTE — ED PROVIDER NOTE - NS ED MD DISPO ADMITTING SERVICE
Chief Complaint   Patient presents with    Cough     Patient presents for a lingering cough x 1-2 weeks with no relief, over the last 3 days the patient now has sore throat, fever(on and off), fatigue, mucus (yellow/green to color). Patient also has some chest pressure. Patient took 2 at home COVID test (negative) last test was 3 days ago. New Patient         Cough  This is a new problem. The current episode started 1 to 4 weeks ago (10 days +). The problem has been gradually worsening. The problem occurs constantly. The cough is Productive of sputum. Associated symptoms include chest pain, nasal congestion, postnasal drip, a sore throat and wheezing. Pertinent negatives include no fever, headaches or shortness of breath. Nothing aggravates the symptoms. He has tried OTC cough suppressant (dayquil) for the symptoms. The treatment provided mild relief. His past medical history is significant for asthma (in childhood). No past medical history on file. Past Surgical History:   Procedure Laterality Date    COLONOSCOPY      NOSE SURGERY Bilateral 8/29/2022    BILATERAL SMRITS performed by Roverto Stinson DO at [address]    SEPTOPLASTY N/A 8/29/2022    SEPTOPLASTY performed by Roverto Stinson DO at [address]       Social History     Tobacco Use    Smoking status: Never     Passive exposure: Never    Smokeless tobacco: Never   Vaping Use    Vaping Use: Never used   Substance Use Topics    Alcohol use: Yes     Comment: socially    Drug use: Never        No Known Allergies     Review of Systems   Constitutional:  Negative for fever. HENT:  Positive for congestion, postnasal drip and sore throat. Respiratory:  Positive for cough, chest tightness and wheezing. Negative for shortness of breath. Cardiovascular:  Positive for chest pain. Neurological:  Negative for headaches. All other systems reviewed and are negative.        /85 (Site: Right Upper Arm, Position: Sitting, Cuff Size: Medium
MED

## 2023-11-15 ENCOUNTER — APPOINTMENT (OUTPATIENT)
Dept: CARDIOLOGY | Facility: CLINIC | Age: 83
End: 2023-11-15
Payer: MEDICARE

## 2023-11-15 ENCOUNTER — NON-APPOINTMENT (OUTPATIENT)
Age: 83
End: 2023-11-15

## 2023-11-15 VITALS
DIASTOLIC BLOOD PRESSURE: 80 MMHG | WEIGHT: 178 LBS | SYSTOLIC BLOOD PRESSURE: 130 MMHG | OXYGEN SATURATION: 97 % | HEART RATE: 81 BPM | BODY MASS INDEX: 29.62 KG/M2

## 2023-11-15 DIAGNOSIS — E11.9 TYPE 2 DIABETES MELLITUS W/OUT COMPLICATIONS: ICD-10-CM

## 2023-11-15 DIAGNOSIS — F32.A ANXIETY DISORDER, UNSPECIFIED: ICD-10-CM

## 2023-11-15 DIAGNOSIS — F41.9 ANXIETY DISORDER, UNSPECIFIED: ICD-10-CM

## 2023-11-15 DIAGNOSIS — Z95.5 PRESENCE OF CORONARY ANGIOPLASTY IMPLANT AND GRAFT: ICD-10-CM

## 2023-11-15 PROCEDURE — 99214 OFFICE O/P EST MOD 30 MIN: CPT | Mod: 25

## 2023-11-15 PROCEDURE — 93000 ELECTROCARDIOGRAM COMPLETE: CPT

## 2023-11-15 RX ORDER — LOSARTAN POTASSIUM 100 MG/1
100 TABLET, FILM COATED ORAL DAILY
Refills: 0 | Status: ACTIVE | COMMUNITY

## 2023-11-15 RX ORDER — METOPROLOL TARTRATE 25 MG/1
25 TABLET, FILM COATED ORAL
Refills: 0 | Status: ACTIVE | COMMUNITY
Start: 2020-11-07

## 2023-11-15 RX ORDER — SEMAGLUTIDE 0.68 MG/ML
2 INJECTION, SOLUTION SUBCUTANEOUS
Refills: 0 | Status: ACTIVE | COMMUNITY

## 2023-11-15 RX ORDER — FINASTERIDE 5 MG/1
5 TABLET, FILM COATED ORAL DAILY
Refills: 0 | Status: ACTIVE | COMMUNITY

## 2023-11-17 NOTE — ED ADULT TRIAGE NOTE - MEANS OF ARRIVAL
Subjective   Patient ID: Nato Downs is a 56 y.o. male who is long term resident being seen and evaluated for multiple medical problems.    HPI     Review of Systems    Objective   There were no vitals taken for this visit.    Physical Exam    Assessment/Plan   Problem List Items Addressed This Visit    None       Goals    None        ambulatory

## 2024-01-05 NOTE — PHYSICAL THERAPY INITIAL EVALUATION ADULT - LIVES WITH, PROFILE
Please continue to follow a heart healthy diet, low in sodium, cholesterol and fats. We recommend a Mediterranean diet:  -Incorporate 2 or more servings per day of vegetables, fruits, and whole grains  -Increase intake of fish and legumes/beans to 2 or more servings per week  -Increase intake of healthy fats, such as olive oil and avocados, and have a handful of nuts/seeds most days  -Reduce red/processed meat consumption to 2 or fewer times per week 
alone

## 2025-06-26 NOTE — PATIENT PROFILE ADULT - FALL HARM RISK - DEVICES
Chief Complaint  Diabetes (Med management, A1C, CGM/Pump Eval, discuss a GLP1)    Referred By: Krystyna Collins,*    Subjective          Patient or patient representative verbalized consent for the use of Ambient Listening during the visit with  ANJU Humphreys for chart documentation. 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    He reports a significant elevation blood glucose levels this morning, which he attributes to a 14 to 15-hour period without his insulin pump. He has previously tried Ozempic but discontinued it due to gastrointestinal side effects. He was also tried on Trulicity a few years ago. He also has a history of diabetic retinopathy, which was evaluated by an ophthalmologist who concluded that it was not related to his use of GLP1 therapy. He has observed weight loss since his last visit. Currently, he uses an OmniPod pump with U200 insulin and takes Farxiga 10 mg daily.       Current Diabetes symptoms:    Polyuria: No   Polydipsia: No   Polyphagia: No   Blurred vision: No   Excessive fatigue: No  Known Diabetes complications:  Neuropathy: Numbness and Shooting Pain; Location: Feet  Renal: Stage II mild (GFR = 60-89 mL/min) and Microalbuminuria - POSITIVE  Eyes: Unspecified diabetic retinopathy and Macular Edema and Location: Bilateral; Date of Last Exam: 12/28/24  Amputation/Wounds: None  GI: None  Cardiovascular: Hypertension and Hyperlipidemia and CHF  ED: Patient Reported; he uses injection therapy  Other: None  Hypoglycemia:  None reported at this time  Hypoglycemia Symptoms:  No hypoglycemia at this time  Current diabetes treatment:  Omnipod insulin pump using Humalog U200  insulin and Farxiga 10 mg once a day  Blood glucose device:  Dexcom CGM  Blood glucose monitoring frequency:  Continuous per CGM  Blood glucose range/average:  The 14-day sensor report shows an average glucose of 221 mg/dL, with 37% in target range ( mgdL), 33% in the high range (181-250 mg/dL), 30% in the very high range (>250 mg/dL), 0% in the low range (54-70 mg/dL) and 0% in the very low range (<54 mg/dL).   Glucose Source: Device Reviewed  Diet:  Carbohydrate Counting, Limits high carb/sweet foods, Avoids sugary drinks  Activity/Exercise:  yard work    Past Medical History:   Diagnosis Date    Arthritis     Congestive heart failure 10/14/2024    [facility] Cardiologist     Depression     Diabetes mellitus     Erectile dysfunction     History of asthma     History of depression     Hyperlipidemia     Hypertension     Low back pain     Lumbosacral disc disease     Obesity     Peripheral neuropathy     Tachycardia      Past Surgical History:   Procedure Laterality Date    EYE SURGERY      Lazer to fix bleeding    HAND SURGERY Left     x2; traumatic injury    HEMORRHOIDECTOMY      Only lancing    HERNIA REPAIR      umbilical    LIPOMA EXCISION      right hip    UMBILICAL HERNIA REPAIR      WISDOM TOOTH EXTRACTION       Family History   Problem Relation Age of Onset    COPD Mother     Lung cancer Mother     Cancer Mother     Miscarriages / Stillbirths Mother     Hyperlipidemia Father     Hypertension Father     Coronary artery disease Father     COPD Father     Alcohol abuse Father     Arthritis Father     Depression Father     Hearing loss Father     Heart disease Father     Mental illness Father     Diabetes Brother      Social History     Socioeconomic History    Marital status:     Number of children: 2   Tobacco Use    Smoking status: Former     Current packs/day: 0.00     Average packs/day: 1 pack/day for 20.0 years (20.0 ttl pk-yrs)     Types: Cigarettes     Start date: 1/1/1990     Quit  date: 2010     Years since quitting: 15.4     Passive exposure: Never    Smokeless tobacco: Former    Tobacco comments:     Off and on   Vaping Use    Vaping status: Never Used   Substance and Sexual Activity    Alcohol use: Yes     Alcohol/week: 1.0 standard drink of alcohol     Types: 1 Cans of beer per week     Comment: Social    Drug use: Never    Sexual activity: Yes     Partners: Female     No Known Allergies    Current Outpatient Medications:     albuterol (PROVENTIL) (2.5 MG/3ML) 0.083% nebulizer solution, Take 2.5 mg by nebulization Every 4 (Four) Hours As Needed for Wheezing., Disp: 50 each, Rfl: 0    atorvastatin (LIPITOR) 20 MG tablet, Take 1 tablet by mouth Daily., Disp: 90 tablet, Rfl: 3    Blood Glucose Monitoring Suppl device, Use as directed for blood glucose monitoring, Disp: 1 each, Rfl: 0    chlorthalidone (HYGROTON) 25 MG tablet, Take 1 tablet by mouth Daily., Disp: , Rfl:     Continuous Glucose Sensor (Dexcom G6 Sensor), Use Every 10 (Ten) Days., Disp: 3 each, Rfl: 5    Continuous Glucose Transmitter (Dexcom G6 Transmitter) misc, Use 1 each Every 3 (Three) Months., Disp: 1 each, Rfl: 1    dapagliflozin Propanediol (Farxiga) 10 MG tablet, Take 10 mg by mouth Daily for 180 days., Disp: 90 tablet, Rfl: 1    furosemide (Lasix) 40 MG tablet, Take 1 tablet by mouth 2 (Two) Times a Day As Needed (leg swelling)., Disp: 60 tablet, Rfl: 0    Glucagon (Gvoke HypoPen 2-Pack) 1 MG/0.2ML solution auto-injector, Inject 1 mg under the skin into the appropriate area as directed As Needed (Severe hypoglycemia)., Disp: 0.2 mL, Rfl: 2    glucose blood test strip, Test blood glucose 1-2 times each day for sensor calibrations, Disp: 50 each, Rfl: 5    Insulin Dispos  Accessories (Omnipod Pod Pals) misc, Use 1 each Every 30 (Thirty) Days., Disp: 1 each, Rfl: 11    Insulin Disposable Pump (Omnipod 5 [id number] Pods Gen 5) misc, Use 1 each Every Other Day., Disp: 15 each, Rfl: 5    Insulin Lispro (HumaLOG KwikPen) 200  "UNIT/ML solution pen-injector, Inject 150 Units under the skin into the appropriate area as directed Daily for 180 days. Maximum daily dose of 200 units per day., Disp: 67 mL, Rfl: 1    Lancets misc, Test blood glucose 1-2 times each day for sensor calibration, Disp: 50 each, Rfl: 5    lisinopril (PRINIVIL,ZESTRIL) 40 MG tablet, Take 1 tablet by mouth Daily., Disp: 90 tablet, Rfl: 3    metoprolol succinate XL (TOPROL-XL) 50 MG 24 hr tablet, TAKE ONE TABLET BY MOUTH TWICE DAILY, Disp: 60 tablet, Rfl: 11    Testosterone 1.62 % gel, APPLY ONE PUMPS WORTH OF GEL APPLIED TO AXILLA EVERY DAY, Disp: , Rfl:     Tirzepatide (Mounjaro) 2.5 MG/0.5ML solution auto-injector, Inject 2.5 mg under the skin into the appropriate area as directed Every 7 (Seven) Days., Disp: 2 mL, Rfl: 1    Current Facility-Administered Medications:     cyanocobalamin injection 1,000 mcg, 1,000 mcg, Intramuscular, Q28 Days, Kaylah Steinberg, APRN, 1,000 mcg at 04/23/25 1553    Objective     Vitals:    06/26/25 0735   BP: 144/77   BP Location: Right arm   Patient Position: Sitting   Cuff Size: Adult   Pulse: 83   SpO2: 97%   Weight: 120 kg (265 lb)   Height: 182.9 cm (72\")     Body mass index is 35.94 kg/m².    Physical Exam  Constitutional:       Appearance: Normal appearance. He is obese.      Comments: Severe Obesity with Comorbidity ( BMI 35 - 39.9) Pt Current BMI = 35.94 with comorbidities of diabetes, hypertension, hyperlipidemia   HENT:      Head: Normocephalic and atraumatic.      Right Ear: External ear normal.      Left Ear: External ear normal.      Nose: Nose normal.   Eyes:      Extraocular Movements: Extraocular movements intact.      Conjunctiva/sclera: Conjunctivae normal.   Pulmonary:      Effort: Pulmonary effort is normal.   Musculoskeletal:         General: Normal range of motion.      Cervical back: Normal range of motion.   Skin:     General: Skin is warm and dry.   Neurological:      General: No focal deficit present.      " Cane Mental Status: He is alert and oriented to person, place, and time. Mental status is at baseline.   Psychiatric:         Mood and Affect: Mood normal.         Behavior: Behavior normal.         Thought Content: Thought content normal.         Judgment: Judgment normal.             Result Review :   The following data was reviewed by: ANJU Humphreys on 06/26/2025:    Most Recent A1C          6/26/2025    07:47   HGBA1C Most Recent   Hemoglobin A1C 8.6        A1C Last 3 Results          1/23/2025    09:00 3/27/2025    07:59 6/26/2025    07:47   HGBA1C Last 3 Results   Hemoglobin A1C 10.1  8.2  8.6      A1c collected in the office today is 8.6%, indicating Uncontrolled Type II diabetes.  This result is up from the prior result of 8.2% collected on 3/27/25     Glucose   Date Value Ref Range Status   06/26/2025 381 (H) 70 - 99 mg/dL Final     Comment:     Serial Number: [id number]:  [id number]     Point of care glucose in the office today is elevated at 381 mg/dL    Creatinine   Date Value Ref Range Status   05/21/2025 1.20 0.76 - 1.27 mg/dL Final   12/12/2024 1.17 0.76 - 1.27 mg/dL Final     eGFR   Date Value Ref Range Status   05/21/2025 74.1 >60.0 mL/min/1.73 Final   12/12/2024 76.4 >60.0 mL/min/1.73 Final     Labs collected on 5/21/25 show Stage II mild (GFR = 60-89mL/min)    Microalbumin, Urine   Date Value Ref Range Status   03/27/2025 47.9 mg/dL Final   11/20/2023 153.0 mg/dL Final     Creatinine, Urine   Date Value Ref Range Status   03/27/2025 50.2 mg/dL Final   11/20/2023 234.9 mg/dL Final     Microalbumin/Creatinine Ratio   Date Value Ref Range Status   03/27/2025 954.2 (H) 0.0 - 29.0 mg/g Final   11/20/2023 651.3 (H) 0.0 - 29.0 mg/g Final     Urine microalbuminuria collected on 3/27/25 is positive for microalbuminuria              Diagnoses and all orders for this visit:    1. 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  Dispense: 2 mL; Refill: 1  -     dapagliflozin Propanediol (Farxiga) 10 MG tablet; Take 10 mg by mouth Daily for 180 days.  Dispense: 90 tablet; Refill: 1  -     Insulin Disposable Pump (Omnipod 5 [id number] Pods Gen 5) misc; Use 1 each Every Other Day.  Dispense: 15 each; Refill: 5    2. 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 Latent autoimmune diabetes mellitus in adult (CHARLENE) with diabetic neuropathy    4. Latent autoimmune diabetes mellitus in adult (CHARLENE) with diabetic retinopathy    5. 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 Insulin pump in place    7. Uses self-applied continuous glucose monitoring device    8. Severe obesity (BMI 35.0-39.9) with comorbidity        Assessment & Plan  1. Diabetes mellitus: inadequately controlled.  - Blood glucose levels remain elevated, with an average of 221 and only 37% within the target range.  - A1c has increased from 8.2 in 03/2025 to 8.6 currently.   - He is currently on Farxiga 10 mg once a day. He is also using an OmniPod pump with U200 insulin, averaging about 110-112 units of insulin daily. He is not entering carbohydrates into his pump consistently which is leading to hyperglycemia.  - He has lost 21 pounds since his last visit.  - A trial of Mounjaro will be initiated to aid in glucose control and further weight loss. The dosage will be gradually increased over the next few months until optimal glucose control is achieved. He is advised to continue using his pump and ensure consistent carbohydrate input. If Mounjaro is well-tolerated, the goal is to reduce his daily insulin usage and potentially switch from U200 to U100 insulin.   - A prescription for Mounjaro 2.5 mg once weekly will be sent to pharmacy,  with instructions on its use provided. He is advised to administer the injection in his abdomen and store the medication in the refrigerator until ready for use. If he tolerates the medication well after the third or fourth week, he should inform via SinglePlatform message so that the dosage can be increased. If he experiences severe abdominal pain or diarrhea, he should discontinue the medication and inform immediately. Refills for his pods and Farxiga have been provided and sent to pharmacy.          The patient will monitor his blood glucose levels per continuous glucose monitor.  If he develops problematic hyperglycemia or hypoglycemia or adverse drug reactions, he will contact the office for further instructions.        Follow Up     Return in about 3 months (around 9/26/2025) for Pump Eval, CGM Follow-up.    Patient was given instructions and counseling regarding his condition or for health maintenance advice. Please see specific information pulled into the AVS if appropriate.     Jenna Marie, APRN  06/26/2025        Dictated Utilizing Dragon Dictation.  Please note that portions of this note were completed with a voice recognition program.  Part of this note may be an electronic transcription/translation of spoken language to printed text using the Dragon Dictation System.